# Patient Record
Sex: FEMALE | Race: BLACK OR AFRICAN AMERICAN | NOT HISPANIC OR LATINO | URBAN - METROPOLITAN AREA
[De-identification: names, ages, dates, MRNs, and addresses within clinical notes are randomized per-mention and may not be internally consistent; named-entity substitution may affect disease eponyms.]

---

## 2018-07-19 ENCOUNTER — INPATIENT (INPATIENT)
Facility: HOSPITAL | Age: 58
LOS: 28 days | Discharge: NURSING, REHAB OR LONG-TERM CARE | End: 2018-08-17
Attending: HOSPITALIST | Admitting: HOSPITALIST
Payer: MEDICAID

## 2018-07-19 VITALS
SYSTOLIC BLOOD PRESSURE: 116 MMHG | HEART RATE: 102 BPM | HEIGHT: 65 IN | RESPIRATION RATE: 18 BRPM | OXYGEN SATURATION: 99 % | WEIGHT: 220.02 LBS | DIASTOLIC BLOOD PRESSURE: 91 MMHG | TEMPERATURE: 98 F

## 2018-07-19 LAB
ABO RH CONFIRMATION: SIGNIFICANT CHANGE UP
ALBUMIN SERPL ELPH-MCNC: 2.1 G/DL — LOW (ref 3.3–5)
ALBUMIN SERPL ELPH-MCNC: 2.2 G/DL — LOW (ref 3.3–5)
ALBUMIN SERPL ELPH-MCNC: 2.7 G/DL — LOW (ref 3.3–5)
ALP SERPL-CCNC: 113 U/L — SIGNIFICANT CHANGE UP (ref 40–120)
ALP SERPL-CCNC: 128 U/L — HIGH (ref 40–120)
ALP SERPL-CCNC: 171 U/L — HIGH (ref 40–120)
ALT FLD-CCNC: 103 U/L — HIGH (ref 12–78)
ALT FLD-CCNC: 106 U/L — HIGH (ref 12–78)
ALT FLD-CCNC: 113 U/L — HIGH (ref 12–78)
AMMONIA BLD-MCNC: 37 UMOL/L — HIGH (ref 11–32)
AMMONIA BLD-MCNC: 65 UMOL/L — HIGH (ref 11–32)
AMPHET UR-MCNC: NEGATIVE — SIGNIFICANT CHANGE UP
AMYLASE P1 CFR SERPL: 69 U/L — SIGNIFICANT CHANGE UP (ref 25–115)
ANION GAP SERPL CALC-SCNC: 14 MMOL/L — SIGNIFICANT CHANGE UP (ref 5–17)
ANION GAP SERPL CALC-SCNC: 25 MMOL/L — HIGH (ref 5–17)
ANION GAP SERPL CALC-SCNC: 26 MMOL/L — HIGH (ref 5–17)
APAP SERPL-MCNC: < 2 UG/ML (ref 10–30)
APPEARANCE UR: ABNORMAL
APTT BLD: 28 SEC — SIGNIFICANT CHANGE UP (ref 27.5–37.4)
AST SERPL-CCNC: 84 U/L — HIGH (ref 15–37)
AST SERPL-CCNC: 94 U/L — HIGH (ref 15–37)
AST SERPL-CCNC: 99 U/L — HIGH (ref 15–37)
BACTERIA # UR AUTO: ABNORMAL
BARBITURATES UR SCN-MCNC: NEGATIVE — SIGNIFICANT CHANGE UP
BASE EXCESS BLDA CALC-SCNC: -1.3 MMOL/L — SIGNIFICANT CHANGE UP (ref -2–2)
BASE EXCESS BLDA CALC-SCNC: -14.4 MMOL/L — LOW (ref -2–2)
BASE EXCESS BLDA CALC-SCNC: -20.8 MMOL/L — LOW (ref -2–2)
BASOPHILS # BLD AUTO: 0.03 K/UL — SIGNIFICANT CHANGE UP (ref 0–0.2)
BASOPHILS NFR BLD AUTO: 0.2 % — SIGNIFICANT CHANGE UP (ref 0–2)
BENZODIAZ UR-MCNC: NEGATIVE — SIGNIFICANT CHANGE UP
BILIRUB DIRECT SERPL-MCNC: 7.48 MG/DL — HIGH (ref 0.05–0.2)
BILIRUB INDIRECT FLD-MCNC: 1.7 MG/DL — HIGH (ref 0.2–1)
BILIRUB SERPL-MCNC: 10.7 MG/DL — HIGH (ref 0.2–1.2)
BILIRUB SERPL-MCNC: 7.8 MG/DL — HIGH (ref 0.2–1.2)
BILIRUB SERPL-MCNC: 9.2 MG/DL — HIGH (ref 0.2–1.2)
BILIRUB SERPL-MCNC: 9.2 MG/DL — HIGH (ref 0.2–1.2)
BILIRUB UR-MCNC: ABNORMAL
BLD GP AB SCN SERPL QL: SIGNIFICANT CHANGE UP
BLOOD GAS COMMENTS: SIGNIFICANT CHANGE UP
BLOOD GAS SOURCE: SIGNIFICANT CHANGE UP
BUN SERPL-MCNC: 47 MG/DL — HIGH (ref 7–23)
BUN SERPL-MCNC: 48 MG/DL — HIGH (ref 7–23)
BUN SERPL-MCNC: 49 MG/DL — HIGH (ref 7–23)
CALCIUM SERPL-MCNC: 7.8 MG/DL — LOW (ref 8.5–10.1)
CALCIUM SERPL-MCNC: 8 MG/DL — LOW (ref 8.5–10.1)
CALCIUM SERPL-MCNC: 8.7 MG/DL — SIGNIFICANT CHANGE UP (ref 8.5–10.1)
CHLORIDE SERPL-SCNC: 101 MMOL/L — SIGNIFICANT CHANGE UP (ref 96–108)
CHLORIDE SERPL-SCNC: 101 MMOL/L — SIGNIFICANT CHANGE UP (ref 96–108)
CHLORIDE SERPL-SCNC: 102 MMOL/L — SIGNIFICANT CHANGE UP (ref 96–108)
CK MB BLD-MCNC: 4.2 % — HIGH (ref 0–3.5)
CK MB CFR SERPL CALC: 2.8 NG/ML — SIGNIFICANT CHANGE UP (ref 0.5–3.6)
CK SERPL-CCNC: 66 U/L — SIGNIFICANT CHANGE UP (ref 26–192)
CK SERPL-CCNC: 83 U/L — SIGNIFICANT CHANGE UP (ref 26–192)
CK SERPL-CCNC: 93 U/L — SIGNIFICANT CHANGE UP (ref 26–192)
CO2 SERPL-SCNC: 12 MMOL/L — LOW (ref 22–31)
CO2 SERPL-SCNC: 22 MMOL/L — SIGNIFICANT CHANGE UP (ref 22–31)
CO2 SERPL-SCNC: 7 MMOL/L — CRITICAL LOW (ref 22–31)
COCAINE METAB.OTHER UR-MCNC: NEGATIVE — SIGNIFICANT CHANGE UP
COLOR SPEC: ABNORMAL
COMMENT - URINE: SIGNIFICANT CHANGE UP
CREAT SERPL-MCNC: 1.46 MG/DL — HIGH (ref 0.5–1.3)
CREAT SERPL-MCNC: 1.62 MG/DL — HIGH (ref 0.5–1.3)
CREAT SERPL-MCNC: 1.79 MG/DL — HIGH (ref 0.5–1.3)
DIFF PNL FLD: ABNORMAL
EOSINOPHIL # BLD AUTO: 0 K/UL — SIGNIFICANT CHANGE UP (ref 0–0.5)
EOSINOPHIL NFR BLD AUTO: 0 % — SIGNIFICANT CHANGE UP (ref 0–6)
EPI CELLS # UR: ABNORMAL
ETHANOL SERPL-MCNC: <10 MG/DL — SIGNIFICANT CHANGE UP (ref 0–10)
GGT SERPL-CCNC: 48 U/L — HIGH (ref 8–40)
GLUCOSE SERPL-MCNC: 114 MG/DL — HIGH (ref 70–99)
GLUCOSE SERPL-MCNC: 206 MG/DL — HIGH (ref 70–99)
GLUCOSE SERPL-MCNC: 75 MG/DL — SIGNIFICANT CHANGE UP (ref 70–99)
GLUCOSE UR QL: NEGATIVE MG/DL — SIGNIFICANT CHANGE UP
HAV IGM SER-ACNC: SIGNIFICANT CHANGE UP
HBV CORE IGM SER-ACNC: SIGNIFICANT CHANGE UP
HBV SURFACE AG SER-ACNC: SIGNIFICANT CHANGE UP
HCG SERPL-ACNC: <1 MIU/ML — SIGNIFICANT CHANGE UP
HCO3 BLDA-SCNC: 10 MMOL/L — LOW (ref 21–29)
HCO3 BLDA-SCNC: 19 MMOL/L — LOW (ref 21–29)
HCO3 BLDA-SCNC: 5 MMOL/L — LOW (ref 21–29)
HCT VFR BLD CALC: 38.4 % — SIGNIFICANT CHANGE UP (ref 34.5–45)
HCV AB S/CO SERPL IA: 0.25 S/CO — SIGNIFICANT CHANGE UP
HCV AB SERPL-IMP: SIGNIFICANT CHANGE UP
HGB BLD-MCNC: 12.3 G/DL — SIGNIFICANT CHANGE UP (ref 11.5–15.5)
HIV 1 & 2 AB SERPL IA.RAPID: SIGNIFICANT CHANGE UP
HOROWITZ INDEX BLDA+IHG-RTO: 32 — SIGNIFICANT CHANGE UP
HOROWITZ INDEX BLDA+IHG-RTO: 45 — SIGNIFICANT CHANGE UP
HOROWITZ INDEX BLDA+IHG-RTO: 60 — SIGNIFICANT CHANGE UP
HYALINE CASTS # UR AUTO: ABNORMAL /LPF
IMM GRANULOCYTES NFR BLD AUTO: 1.5 % — SIGNIFICANT CHANGE UP (ref 0–1.5)
INR BLD: 2.74 RATIO — HIGH (ref 0.88–1.16)
INR BLD: 3.2 RATIO — HIGH (ref 0.88–1.16)
INR BLD: 3.95 RATIO — HIGH (ref 0.88–1.16)
KETONES UR-MCNC: ABNORMAL
LACTATE SERPL-SCNC: 12.2 MMOL/L — CRITICAL HIGH (ref 0.7–2)
LACTATE SERPL-SCNC: 13.6 MMOL/L — CRITICAL HIGH (ref 0.7–2)
LACTATE SERPL-SCNC: 6.1 MMOL/L — CRITICAL HIGH (ref 0.7–2)
LEUKOCYTE ESTERASE UR-ACNC: ABNORMAL
LIDOCAIN IGE QN: 169 U/L — SIGNIFICANT CHANGE UP (ref 73–393)
LYMPHOCYTES # BLD AUTO: 15.5 % — SIGNIFICANT CHANGE UP (ref 13–44)
LYMPHOCYTES # BLD AUTO: 2.2 K/UL — SIGNIFICANT CHANGE UP (ref 1–3.3)
MAGNESIUM SERPL-MCNC: 2.4 MG/DL — SIGNIFICANT CHANGE UP (ref 1.6–2.6)
MCHC RBC-ENTMCNC: 28.3 PG — SIGNIFICANT CHANGE UP (ref 27–34)
MCHC RBC-ENTMCNC: 32 GM/DL — SIGNIFICANT CHANGE UP (ref 32–36)
MCV RBC AUTO: 88.3 FL — SIGNIFICANT CHANGE UP (ref 80–100)
METHADONE UR-MCNC: NEGATIVE — SIGNIFICANT CHANGE UP
MONOCYTES # BLD AUTO: 0.73 K/UL — SIGNIFICANT CHANGE UP (ref 0–0.9)
MONOCYTES NFR BLD AUTO: 5.1 % — SIGNIFICANT CHANGE UP (ref 2–14)
NEUTROPHILS # BLD AUTO: 11.04 K/UL — HIGH (ref 1.8–7.4)
NEUTROPHILS NFR BLD AUTO: 77.7 % — HIGH (ref 43–77)
NITRITE UR-MCNC: POSITIVE
NRBC # BLD: 0 /100 WBCS — SIGNIFICANT CHANGE UP (ref 0–0)
NT-PROBNP SERPL-SCNC: 7353 PG/ML — HIGH (ref 0–125)
OPIATES UR-MCNC: NEGATIVE — SIGNIFICANT CHANGE UP
PCO2 BLDA: 13 MMHG — LOW (ref 32–46)
PCO2 BLDA: 19 MMHG — LOW (ref 32–46)
PCO2 BLDA: 21 MMHG — LOW (ref 32–46)
PCP SPEC-MCNC: SIGNIFICANT CHANGE UP
PCP UR-MCNC: NEGATIVE — SIGNIFICANT CHANGE UP
PH BLD: 7.25 — LOW (ref 7.35–7.45)
PH BLD: 7.33 — LOW (ref 7.35–7.45)
PH BLD: 7.58 — HIGH (ref 7.35–7.45)
PH UR: 5 — SIGNIFICANT CHANGE UP (ref 5–8)
PLATELET # BLD AUTO: 170 K/UL — SIGNIFICANT CHANGE UP (ref 150–400)
PO2 BLDA: 134 MMHG — HIGH (ref 74–108)
PO2 BLDA: 215 MMHG — HIGH (ref 74–108)
PO2 BLDA: 272 MMHG — HIGH (ref 74–108)
POTASSIUM SERPL-MCNC: 3.2 MMOL/L — LOW (ref 3.5–5.3)
POTASSIUM SERPL-MCNC: 3.5 MMOL/L — SIGNIFICANT CHANGE UP (ref 3.5–5.3)
POTASSIUM SERPL-MCNC: 4.2 MMOL/L — SIGNIFICANT CHANGE UP (ref 3.5–5.3)
POTASSIUM SERPL-SCNC: 3.2 MMOL/L — LOW (ref 3.5–5.3)
POTASSIUM SERPL-SCNC: 3.5 MMOL/L — SIGNIFICANT CHANGE UP (ref 3.5–5.3)
POTASSIUM SERPL-SCNC: 4.2 MMOL/L — SIGNIFICANT CHANGE UP (ref 3.5–5.3)
PROT SERPL-MCNC: 6.1 GM/DL — SIGNIFICANT CHANGE UP (ref 6–8.3)
PROT SERPL-MCNC: 6.4 GM/DL — SIGNIFICANT CHANGE UP (ref 6–8.3)
PROT SERPL-MCNC: 7.7 GM/DL — SIGNIFICANT CHANGE UP (ref 6–8.3)
PROT UR-MCNC: 100 MG/DL
PROTHROM AB SERPL-ACNC: 30.5 SEC — HIGH (ref 9.8–12.7)
PROTHROM AB SERPL-ACNC: 35.7 SEC — HIGH (ref 9.8–12.7)
PROTHROM AB SERPL-ACNC: 44.3 SEC — HIGH (ref 9.8–12.7)
RBC # BLD: 4.35 M/UL — SIGNIFICANT CHANGE UP (ref 3.8–5.2)
RBC # FLD: 25.4 % — HIGH (ref 10.3–14.5)
RBC CASTS # UR COMP ASSIST: ABNORMAL /HPF (ref 0–4)
SAO2 % BLDA: 100 % — HIGH (ref 92–96)
SAO2 % BLDA: 98 % — HIGH (ref 92–96)
SAO2 % BLDA: 98 % — HIGH (ref 92–96)
SODIUM SERPL-SCNC: 134 MMOL/L — LOW (ref 135–145)
SODIUM SERPL-SCNC: 137 MMOL/L — SIGNIFICANT CHANGE UP (ref 135–145)
SODIUM SERPL-SCNC: 139 MMOL/L — SIGNIFICANT CHANGE UP (ref 135–145)
SP GR SPEC: 1.02 — SIGNIFICANT CHANGE UP (ref 1.01–1.02)
THC UR QL: NEGATIVE — SIGNIFICANT CHANGE UP
TROPONIN I SERPL-MCNC: 0.03 NG/ML — SIGNIFICANT CHANGE UP (ref 0.01–0.04)
TROPONIN I SERPL-MCNC: 0.08 NG/ML — HIGH (ref 0.01–0.04)
TROPONIN I SERPL-MCNC: 0.09 NG/ML — HIGH (ref 0.01–0.04)
TSH SERPL-MCNC: 0.01 UU/ML — LOW (ref 0.36–3.74)
UROBILINOGEN FLD QL: 8 MG/DL
WBC # BLD: 14.22 K/UL — HIGH (ref 3.8–10.5)
WBC # FLD AUTO: 14.22 K/UL — HIGH (ref 3.8–10.5)
WBC UR QL: ABNORMAL

## 2018-07-19 PROCEDURE — 70450 CT HEAD/BRAIN W/O DYE: CPT | Mod: 26

## 2018-07-19 PROCEDURE — 76700 US EXAM ABDOM COMPLETE: CPT | Mod: 26

## 2018-07-19 PROCEDURE — 71045 X-RAY EXAM CHEST 1 VIEW: CPT | Mod: 26

## 2018-07-19 PROCEDURE — 99223 1ST HOSP IP/OBS HIGH 75: CPT

## 2018-07-19 PROCEDURE — 99291 CRITICAL CARE FIRST HOUR: CPT

## 2018-07-19 PROCEDURE — 93306 TTE W/DOPPLER COMPLETE: CPT | Mod: 26

## 2018-07-19 PROCEDURE — 93970 EXTREMITY STUDY: CPT | Mod: 26

## 2018-07-19 PROCEDURE — 74176 CT ABD & PELVIS W/O CONTRAST: CPT | Mod: 26

## 2018-07-19 PROCEDURE — 99292 CRITICAL CARE ADDL 30 MIN: CPT

## 2018-07-19 PROCEDURE — 99053 MED SERV 10PM-8AM 24 HR FAC: CPT

## 2018-07-19 PROCEDURE — 71250 CT THORAX DX C-: CPT | Mod: 26

## 2018-07-19 PROCEDURE — 71045 X-RAY EXAM CHEST 1 VIEW: CPT | Mod: 26,77

## 2018-07-19 RX ORDER — MEROPENEM 1 G/30ML
INJECTION INTRAVENOUS
Qty: 0 | Refills: 0 | Status: DISCONTINUED | OUTPATIENT
Start: 2018-07-19 | End: 2018-07-19

## 2018-07-19 RX ORDER — MEROPENEM 1 G/30ML
INJECTION INTRAVENOUS
Qty: 0 | Refills: 0 | Status: DISCONTINUED | OUTPATIENT
Start: 2018-07-19 | End: 2018-08-09

## 2018-07-19 RX ORDER — FLUCONAZOLE 150 MG/1
TABLET ORAL
Qty: 0 | Refills: 0 | Status: DISCONTINUED | OUTPATIENT
Start: 2018-07-19 | End: 2018-07-26

## 2018-07-19 RX ORDER — FLUCONAZOLE 150 MG/1
200 TABLET ORAL EVERY 24 HOURS
Qty: 0 | Refills: 0 | Status: DISCONTINUED | OUTPATIENT
Start: 2018-07-20 | End: 2018-07-26

## 2018-07-19 RX ORDER — ACETYLCYSTEINE 200 MG/ML
10 VIAL (ML) MISCELLANEOUS ONCE
Qty: 0 | Refills: 0 | Status: COMPLETED | OUTPATIENT
Start: 2018-07-19 | End: 2018-07-19

## 2018-07-19 RX ORDER — AZITHROMYCIN 500 MG/1
500 TABLET, FILM COATED ORAL EVERY 24 HOURS
Qty: 0 | Refills: 0 | Status: DISCONTINUED | OUTPATIENT
Start: 2018-07-20 | End: 2018-07-20

## 2018-07-19 RX ORDER — PROPOFOL 10 MG/ML
50 INJECTION, EMULSION INTRAVENOUS
Qty: 1000 | Refills: 0 | Status: DISCONTINUED | OUTPATIENT
Start: 2018-07-19 | End: 2018-07-23

## 2018-07-19 RX ORDER — FENTANYL CITRATE 50 UG/ML
100 INJECTION INTRAVENOUS ONCE
Qty: 0 | Refills: 0 | Status: DISCONTINUED | OUTPATIENT
Start: 2018-07-19 | End: 2018-07-19

## 2018-07-19 RX ORDER — FLUCONAZOLE 150 MG/1
200 TABLET ORAL ONCE
Qty: 0 | Refills: 0 | Status: COMPLETED | OUTPATIENT
Start: 2018-07-19 | End: 2018-07-19

## 2018-07-19 RX ORDER — MEROPENEM 1 G/30ML
1000 INJECTION INTRAVENOUS EVERY 8 HOURS
Qty: 0 | Refills: 0 | Status: DISCONTINUED | OUTPATIENT
Start: 2018-07-19 | End: 2018-07-19

## 2018-07-19 RX ORDER — MEROPENEM 1 G/30ML
1000 INJECTION INTRAVENOUS ONCE
Qty: 0 | Refills: 0 | Status: DISCONTINUED | OUTPATIENT
Start: 2018-07-19 | End: 2018-07-19

## 2018-07-19 RX ORDER — LACTULOSE 10 G/15ML
20 SOLUTION ORAL THREE TIMES A DAY
Qty: 0 | Refills: 0 | Status: DISCONTINUED | OUTPATIENT
Start: 2018-07-19 | End: 2018-07-19

## 2018-07-19 RX ORDER — SODIUM CHLORIDE 9 MG/ML
1000 INJECTION INTRAMUSCULAR; INTRAVENOUS; SUBCUTANEOUS ONCE
Qty: 0 | Refills: 0 | Status: COMPLETED | OUTPATIENT
Start: 2018-07-19 | End: 2018-07-19

## 2018-07-19 RX ORDER — MIDAZOLAM HYDROCHLORIDE 1 MG/ML
6 INJECTION, SOLUTION INTRAMUSCULAR; INTRAVENOUS ONCE
Qty: 0 | Refills: 0 | Status: DISCONTINUED | OUTPATIENT
Start: 2018-07-19 | End: 2018-07-19

## 2018-07-19 RX ORDER — CHLORHEXIDINE GLUCONATE 213 G/1000ML
15 SOLUTION TOPICAL
Qty: 0 | Refills: 0 | Status: DISCONTINUED | OUTPATIENT
Start: 2018-07-19 | End: 2018-07-24

## 2018-07-19 RX ORDER — FUROSEMIDE 40 MG
40 TABLET ORAL ONCE
Qty: 0 | Refills: 0 | Status: COMPLETED | OUTPATIENT
Start: 2018-07-19 | End: 2018-07-19

## 2018-07-19 RX ORDER — ACETYLCYSTEINE 200 MG/ML
15 VIAL (ML) MISCELLANEOUS ONCE
Qty: 0 | Refills: 0 | Status: COMPLETED | OUTPATIENT
Start: 2018-07-19 | End: 2018-07-19

## 2018-07-19 RX ORDER — CIPROFLOXACIN LACTATE 400MG/40ML
400 VIAL (ML) INTRAVENOUS ONCE
Qty: 0 | Refills: 0 | Status: COMPLETED | OUTPATIENT
Start: 2018-07-19 | End: 2018-07-19

## 2018-07-19 RX ORDER — AZTREONAM 2 G
1000 VIAL (EA) INJECTION ONCE
Qty: 0 | Refills: 0 | Status: COMPLETED | OUTPATIENT
Start: 2018-07-19 | End: 2018-07-19

## 2018-07-19 RX ORDER — PANTOPRAZOLE SODIUM 20 MG/1
40 TABLET, DELAYED RELEASE ORAL DAILY
Qty: 0 | Refills: 0 | Status: DISCONTINUED | OUTPATIENT
Start: 2018-07-19 | End: 2018-07-21

## 2018-07-19 RX ORDER — AZITHROMYCIN 500 MG/1
500 TABLET, FILM COATED ORAL ONCE
Qty: 0 | Refills: 0 | Status: COMPLETED | OUTPATIENT
Start: 2018-07-19 | End: 2018-07-19

## 2018-07-19 RX ORDER — AZTREONAM 2 G
1000 VIAL (EA) INJECTION EVERY 8 HOURS
Qty: 0 | Refills: 0 | Status: DISCONTINUED | OUTPATIENT
Start: 2018-07-19 | End: 2018-07-19

## 2018-07-19 RX ORDER — MEROPENEM 1 G/30ML
1000 INJECTION INTRAVENOUS ONCE
Qty: 0 | Refills: 0 | Status: COMPLETED | OUTPATIENT
Start: 2018-07-19 | End: 2018-07-19

## 2018-07-19 RX ORDER — AZITHROMYCIN 500 MG/1
TABLET, FILM COATED ORAL
Qty: 0 | Refills: 0 | Status: DISCONTINUED | OUTPATIENT
Start: 2018-07-19 | End: 2018-07-20

## 2018-07-19 RX ORDER — CHLORHEXIDINE GLUCONATE 213 G/1000ML
1 SOLUTION TOPICAL
Qty: 0 | Refills: 0 | Status: DISCONTINUED | OUTPATIENT
Start: 2018-07-19 | End: 2018-08-02

## 2018-07-19 RX ORDER — METRONIDAZOLE 500 MG
500 TABLET ORAL EVERY 8 HOURS
Qty: 0 | Refills: 0 | Status: DISCONTINUED | OUTPATIENT
Start: 2018-07-19 | End: 2018-07-19

## 2018-07-19 RX ORDER — MEROPENEM 1 G/30ML
1000 INJECTION INTRAVENOUS EVERY 8 HOURS
Qty: 0 | Refills: 0 | Status: DISCONTINUED | OUTPATIENT
Start: 2018-07-19 | End: 2018-08-09

## 2018-07-19 RX ORDER — SODIUM BICARBONATE 1 MEQ/ML
100 SYRINGE (ML) INTRAVENOUS ONCE
Qty: 0 | Refills: 0 | Status: COMPLETED | OUTPATIENT
Start: 2018-07-19 | End: 2018-07-19

## 2018-07-19 RX ORDER — METRONIDAZOLE 500 MG
500 TABLET ORAL ONCE
Qty: 0 | Refills: 0 | Status: COMPLETED | OUTPATIENT
Start: 2018-07-19 | End: 2018-07-19

## 2018-07-19 RX ORDER — METRONIDAZOLE 500 MG
TABLET ORAL
Qty: 0 | Refills: 0 | Status: DISCONTINUED | OUTPATIENT
Start: 2018-07-19 | End: 2018-07-19

## 2018-07-19 RX ORDER — NOREPINEPHRINE BITARTRATE/D5W 8 MG/250ML
0.05 PLASTIC BAG, INJECTION (ML) INTRAVENOUS
Qty: 8 | Refills: 0 | Status: DISCONTINUED | OUTPATIENT
Start: 2018-07-19 | End: 2018-07-19

## 2018-07-19 RX ORDER — ACETYLCYSTEINE 200 MG/ML
5 VIAL (ML) MISCELLANEOUS ONCE
Qty: 0 | Refills: 0 | Status: COMPLETED | OUTPATIENT
Start: 2018-07-19 | End: 2018-07-19

## 2018-07-19 RX ORDER — PHYTONADIONE (VIT K1) 5 MG
10 TABLET ORAL ONCE
Qty: 0 | Refills: 0 | Status: COMPLETED | OUTPATIENT
Start: 2018-07-19 | End: 2018-07-19

## 2018-07-19 RX ORDER — POTASSIUM CHLORIDE 20 MEQ
40 PACKET (EA) ORAL ONCE
Qty: 0 | Refills: 0 | Status: COMPLETED | OUTPATIENT
Start: 2018-07-19 | End: 2018-07-19

## 2018-07-19 RX ORDER — ALTEPLASE 100 MG
2 KIT INTRAVENOUS ONCE
Qty: 0 | Refills: 0 | Status: COMPLETED | OUTPATIENT
Start: 2018-07-19 | End: 2018-07-19

## 2018-07-19 RX ORDER — SODIUM BICARBONATE 1 MEQ/ML
0.23 SYRINGE (ML) INTRAVENOUS
Qty: 150 | Refills: 0 | Status: DISCONTINUED | OUTPATIENT
Start: 2018-07-19 | End: 2018-07-20

## 2018-07-19 RX ORDER — NOREPINEPHRINE BITARTRATE/D5W 8 MG/250ML
0.05 PLASTIC BAG, INJECTION (ML) INTRAVENOUS
Qty: 16 | Refills: 0 | Status: DISCONTINUED | OUTPATIENT
Start: 2018-07-19 | End: 2018-07-22

## 2018-07-19 RX ORDER — AZTREONAM 2 G
VIAL (EA) INJECTION
Qty: 0 | Refills: 0 | Status: DISCONTINUED | OUTPATIENT
Start: 2018-07-19 | End: 2018-07-19

## 2018-07-19 RX ORDER — VANCOMYCIN HCL 1 G
1000 VIAL (EA) INTRAVENOUS ONCE
Qty: 0 | Refills: 0 | Status: COMPLETED | OUTPATIENT
Start: 2018-07-19 | End: 2018-07-19

## 2018-07-19 RX ADMIN — SODIUM CHLORIDE 1000 MILLILITER(S): 9 INJECTION INTRAMUSCULAR; INTRAVENOUS; SUBCUTANEOUS at 07:25

## 2018-07-19 RX ADMIN — Medication 40 MILLIGRAM(S): at 08:54

## 2018-07-19 RX ADMIN — Medication 325 GRAM(S): at 12:26

## 2018-07-19 RX ADMIN — Medication 250 MILLIGRAM(S): at 07:38

## 2018-07-19 RX ADMIN — PANTOPRAZOLE SODIUM 40 MILLIGRAM(S): 20 TABLET, DELAYED RELEASE ORAL at 18:55

## 2018-07-19 RX ADMIN — Medication 150 MEQ/KG/HR: at 21:45

## 2018-07-19 RX ADMIN — MIDAZOLAM HYDROCHLORIDE 6 MILLIGRAM(S): 1 INJECTION, SOLUTION INTRAMUSCULAR; INTRAVENOUS at 11:10

## 2018-07-19 RX ADMIN — Medication 9.36 MICROGRAM(S)/KG/MIN: at 11:20

## 2018-07-19 RX ADMIN — FENTANYL CITRATE 100 MICROGRAM(S): 50 INJECTION INTRAVENOUS at 13:48

## 2018-07-19 RX ADMIN — AZITHROMYCIN 255 MILLIGRAM(S): 500 TABLET, FILM COATED ORAL at 19:33

## 2018-07-19 RX ADMIN — PROPOFOL 29.94 MICROGRAM(S)/KG/MIN: 10 INJECTION, EMULSION INTRAVENOUS at 17:45

## 2018-07-19 RX ADMIN — Medication 200 MILLIGRAM(S): at 08:54

## 2018-07-19 RX ADMIN — Medication 100 MILLIGRAM(S): at 14:03

## 2018-07-19 RX ADMIN — Medication 102 MILLIGRAM(S): at 16:54

## 2018-07-19 RX ADMIN — Medication 4.68 MICROGRAM(S)/KG/MIN: at 14:03

## 2018-07-19 RX ADMIN — Medication 50 MILLIGRAM(S): at 16:01

## 2018-07-19 RX ADMIN — Medication 150 MEQ/KG/HR: at 12:30

## 2018-07-19 RX ADMIN — Medication 65.63 GRAM(S): at 18:12

## 2018-07-19 RX ADMIN — FENTANYL CITRATE 100 MICROGRAM(S): 50 INJECTION INTRAVENOUS at 11:40

## 2018-07-19 RX ADMIN — Medication 100 MILLIEQUIVALENT(S): at 10:15

## 2018-07-19 RX ADMIN — CHLORHEXIDINE GLUCONATE 1 APPLICATION(S): 213 SOLUTION TOPICAL at 12:27

## 2018-07-19 RX ADMIN — FENTANYL CITRATE 100 MICROGRAM(S): 50 INJECTION INTRAVENOUS at 11:10

## 2018-07-19 RX ADMIN — SODIUM CHLORIDE 1000 MILLILITER(S): 9 INJECTION INTRAMUSCULAR; INTRAVENOUS; SUBCUTANEOUS at 06:15

## 2018-07-19 RX ADMIN — CHLORHEXIDINE GLUCONATE 15 MILLILITER(S): 213 SOLUTION TOPICAL at 17:45

## 2018-07-19 RX ADMIN — ALTEPLASE 2 MILLIGRAM(S): KIT at 15:25

## 2018-07-19 RX ADMIN — FLUCONAZOLE 100 MILLIGRAM(S): 150 TABLET ORAL at 18:12

## 2018-07-19 RX ADMIN — MEROPENEM 100 MILLIGRAM(S): 1 INJECTION INTRAVENOUS at 18:55

## 2018-07-19 RX ADMIN — FENTANYL CITRATE 100 MICROGRAM(S): 50 INJECTION INTRAVENOUS at 12:19

## 2018-07-19 RX ADMIN — SODIUM CHLORIDE 1000 MILLILITER(S): 9 INJECTION INTRAMUSCULAR; INTRAVENOUS; SUBCUTANEOUS at 11:25

## 2018-07-19 RX ADMIN — PROPOFOL 29.94 MICROGRAM(S)/KG/MIN: 10 INJECTION, EMULSION INTRAVENOUS at 11:15

## 2018-07-19 RX ADMIN — MEROPENEM 100 MILLIGRAM(S): 1 INJECTION INTRAVENOUS at 22:07

## 2018-07-19 RX ADMIN — Medication 131.25 GRAM(S): at 13:52
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
## 2018-07-19 NOTE — CONSULT NOTE ADULT - SUBJECTIVE AND OBJECTIVE BOX
CARDIOLOGY CONSULT NOTE    Patient is a 54y Female with a known history of :    HPI:  55yo lady who presented to the ER in respiratory distress / metabolic acidosis / hepatic encephalopathy / hepatic failure.  Started on bicarb gtt.  Intubated after a ?PEA arrest s/p DCCV for Afib with RVR.  Per ICU attending, bedside echo with RV dilation, severe TR, septal bowing, mildly depressed LVEF    REVIEW OF SYSTEMS:  unable to obtain from pt    MEDICATIONS  (STANDING):  acetylcysteine IVPB 10 Gram(s) IV Intermittent once  aztreonam  IVPB 1000 milliGRAM(s) IV Intermittent once  aztreonam  IVPB 1000 milliGRAM(s) IV Intermittent every 8 hours  aztreonam  IVPB      chlorhexidine 0.12% Liquid 15 milliLiter(s) Swish and Spit two times a day  chlorhexidine 4% Liquid 1 Application(s) Topical <User Schedule>  lactulose Syrup 20 Gram(s) Oral three times a day  metroNIDAZOLE  IVPB      metroNIDAZOLE  IVPB 500 milliGRAM(s) IV Intermittent every 8 hours  norepinephrine Infusion 0.05 MICROgram(s)/kG/Min (4.678 mL/Hr) IV Continuous <Continuous>  propofol Infusion 50 MICROgram(s)/kG/Min (29.94 mL/Hr) IV Continuous <Continuous>  sodium bicarbonate  Infusion 0.225 mEq/kG/Hr (150 mL/Hr) IV Continuous <Continuous>    MEDICATIONS  (PRN):      ALLERGIES: penicillin (Unknown)      FAMILY HISTORY:      PHYSICAL EXAMINATION:  -----------------------------  T(C): 36.6 (07-19-18 @ 04:40), Max: 36.6 (07-19-18 @ 04:40)  HR: 72 (07-19-18 @ 13:34) (69 - 102)  BP: 120/89 (07-19-18 @ 13:30) (84/71 - 120/89)  RR: 23 (07-19-18 @ 13:30) (18 - 24)  SpO2: 100% (07-19-18 @ 13:34) (87% - 100%)  Wt(kg): --    Height (cm): 165.1 (07-19 @ 04:40)  Weight (kg): 89.5 (07-19 @ 11:15)  BMI (kg/m2): 32.8 (07-19 @ 11:15)  BSA (m2): 1.97 (07-19 @ 11:15)    Constitutional: intubated  Eyes: the conjunctiva exhibited no abnormalities and the eyelids demonstrated no xanthelasmas.   HEENT: normal oral mucosa, no oral pallor and no oral cyanosis.   Neck: normal jugular venous A waves present, normal jugular venous V waves present and no jugular venous mcknight A waves.   Pulmonary: no respiratory distress, normal respiratory rhythm and effort, no accessory muscle use and lungs were clear to auscultation bilaterally.   Cardiovascular: heart rate and rhythm were normal, normal S1 and S2 and no murmur, gallop, rub, heave or thrill are present.   Abdomen: soft, non-tender, no hepato-splenomegaly and no abdominal mass palpated.   Musculoskeletal: the gait could not be assessed..   Extremities: no clubbing of the fingernails, no localized cyanosis, no petechial hemorrhages and no ischemic changes.   Skin: normal skin color and pigmentation, no rash, no venous stasis, no skin lesions, no skin ulcer and no xanthoma was observed.   Psychiatric: intubated    LABS:   --------  07-19    134<L>  |  102  |  49<H>  ----------------------------<  75  4.2   |  7<LL>  |  1.79<H>    Ca    8.7      19 Jul 2018 06:08  Mg     2.4     07-19    TPro  7.7  /  Alb  2.7<L>  /  TBili  10.7<H>  /  DBili  x   /  AST  84<H>  /  ALT  113<H>  /  AlkPhos  171<H>  07-19                         12.3   14.22 )-----------( 170      ( 19 Jul 2018 06:08 )             38.4     PT/INR - ( 19 Jul 2018 13:59 )   PT: 44.3 sec;   INR: 3.95 ratio         PTT - ( 19 Jul 2018 06:08 )  PTT:28.0 sec  07-19 @ 06:08 BNP: 7353 pg/mL    07-19 @ 06:08 CPK total:--, CKMB --, Troponin I - .034 ng/mL

## 2018-07-19 NOTE — CHART NOTE - NSCHARTNOTEFT_GEN_A_CORE
Brown port of triple lumen catheter with very slow flow and minimal blood return. Cathflo instilled. Will let dwell for 2 hours per protocol.   TIM DuttonC
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
## 2018-07-19 NOTE — ED ADULT NURSE NOTE - WOUND SIZE #1
chief complaint : dyspnea        SUBJECTIVE / OVERNIGHT EVENTS: pt shortness of breath at present , placed in bipap / given nebs, as per nursing staff - pt been eating salty food and drinking coffee liquids brought in by pts family since yesterday     MEDICATIONS  (STANDING):  ALBUTerol/ipratropium for Nebulization 3 milliLiter(s) Nebulizer every 6 hours  amiodarone    Tablet 200 milliGRAM(s) Oral daily  ampicillin  IVPB 2 Gram(s) IV Intermittent every 12 hours  artificial  tears Solution 1 Drop(s) Both EYES four times a day  aspirin  chewable 81 milliGRAM(s) Oral daily  atorvastatin 40 milliGRAM(s) Oral at bedtime  busPIRone 5 milliGRAM(s) Oral two times a day  clopidogrel Tablet 75 milliGRAM(s) Oral daily  dextrose 50% Injectable 12.5 Gram(s) IV Push once  dextrose 50% Injectable 25 Gram(s) IV Push once  dextrose 50% Injectable 25 Gram(s) IV Push once  epoetin georgie Injectable 4000 Unit(s) IV Push <User Schedule>  heparin  Infusion.  Unit(s)/Hr (11 mL/Hr) IV Continuous <Continuous>  insulin lispro (HumaLOG) corrective regimen sliding scale   SubCutaneous three times a day before meals  insulin lispro (HumaLOG) corrective regimen sliding scale   SubCutaneous at bedtime  metoprolol tartrate 25 milliGRAM(s) Oral two times a day  multivitamin 1 Tablet(s) Oral daily  pantoprazole    Tablet 40 milliGRAM(s) Oral before breakfast  tamsulosin 0.4 milliGRAM(s) Oral at bedtime    MEDICATIONS  (PRN):  acetaminophen  Suppository 650 milliGRAM(s) Rectal every 6 hours PRN For Temp greater than 38 C (100.4 F)  dextrose 40% Gel 15 Gram(s) Oral once PRN Blood Glucose LESS THAN 70 milliGRAM(s)/deciliter  glucagon  Injectable 1 milliGRAM(s) IntraMuscular once PRN Glucose LESS THAN 70 milligrams/deciliter  heparin  Injectable 5000 Unit(s) IV Push every 6 hours PRN For aPTT less than 40  heparin  Injectable 2500 Unit(s) IV Push every 6 hours PRN For aPTT between 40 - 57    Vital Signs Last 24 Hrs  T(C): 36.9 (24 Jun 2018 18:40), Max: 36.9 (24 Jun 2018 18:40)  T(F): 98.4 (24 Jun 2018 18:40), Max: 98.4 (24 Jun 2018 18:40)  HR: 83 (24 Jun 2018 19:39) (77 - 103)  BP: 109/72 (24 Jun 2018 18:40) (109/72 - 144/80)  BP(mean): --  RR: 20 (24 Jun 2018 18:40) (18 - 28)  SpO2: 95% (24 Jun 2018 19:39) (95% - 100%)    Constitutional: No fever, fatigue  Skin: No rash.  Eyes: No recent vision problems or eye pain.  ENT: No congestion, ear pain, or sore throat.  Cardiovascular: No chest pain or palpation.  Respiratory: No cough, shortness of breath, congestion, or wheezing.  Gastrointestinal: No abdominal pain, nausea, vomiting, or diarrhea.  Genitourinary: No dysuria.  Musculoskeletal: No joint swelling.  Neurologic: No headache.    PHYSICAL EXAM:  GENERAL: NAD  EYES: EOMI, PERRLA  NECK: Supple, No JVD  CHEST/LUNG: dec breath sounds at bases, fine exp wheezing+  HEART:  S1 , S2 +  ABDOMEN: soft bs+  EXTREMITIES:  trace edema+  NEUROLOGY:alert awake     LABS:  06-24    137  |  95<L>  |  26<H>  ----------------------------<  157<H>  3.7   |  26  |  4.44<H>    Ca    8.8      24 Jun 2018 03:45  Mg     2.2     06-24      Creatinine Trend: 4.44 <--, 6.21 <--, 4.68 <--, 6.67 <--, 6.08 <--, 4.72 <--, 4.12 <--, 7.78 <--                        7.1    6.51  )-----------( 257      ( 24 Jun 2018 03:45 )             23.9     Urine Studies: 2.5x1.5x0.2

## 2018-07-19 NOTE — CONSULT NOTE ADULT - SUBJECTIVE AND OBJECTIVE BOX
HPI:  53 y/o black female with unknown PMH, admitted via the ER today with weakness and found to be in Rapid AFib and  hyopoglycemic with an accucheck of 26.  Patient was Cardioverted to RSR and noted to be hypotensive and with respiratory distress and metabolic acidosis and required intubation in the ER.  Prior to intubation patient was reportedly a poor historian and EMS reported that the home she was picked up from appeared to be unkempt.  Patient was transferred to the CCU for further care and w/u revealed increase WBC's, increased TBili, worsening PT/ INR/ PTT, and U/A revealed pyuria and LE +.  Blood and urine Cx/s were obtained and patient was rx'ed with Vanco x 1, Cipro x 1, Azactam, and Flagyl.   CT of the Head, Chest, Abdomen, and Pelvis was done and now reported with a Lt Staghorn calculus with air visible in the Lt renal calyces along with ? Pericholecystic fluid or GB wall thickening, and densities in the Rt mainstem bronchus along with bilateral infiltrates - ? PNA vs pulmonary edema?    Allergies :  penicillin (Unknown)  Intolerances - none        Social History:  Tobacco: negative x several yrs.  Alcohol: negative  Recent Travel: unknown  Immunizations: unknown  Reportedly lives in a home with a male - unknown relationship -  house reported by EMS as appearing unkempt      MEDICATIONS  (STANDING):  acetylcysteine IVPB 10 Gram(s) IV Intermittent once  aztreonam  IVPB      aztreonam  IVPB 1000 milliGRAM(s) IV Intermittent every 8 hours  chlorhexidine 0.12% Liquid 15 milliLiter(s) Swish and Spit two times a day  chlorhexidine 4% Liquid 1 Application(s) Topical <User Schedule>  metroNIDAZOLE  IVPB      metroNIDAZOLE  IVPB 500 milliGRAM(s) IV Intermittent every 8 hours  norepinephrine Infusion 0.05 MICROgram(s)/kG/Min (4.678 mL/Hr) IV Continuous <Continuous>  phytonadione  IVPB 10 milliGRAM(s) IV Intermittent once  propofol Infusion 50 MICROgram(s)/kG/Min (29.94 mL/Hr) IV Continuous <Continuous>  sodium bicarbonate  Infusion 0.225 mEq/kG/Hr (150 mL/Hr) IV Continuous <Continuous>    MEDICATIONS  (PRN):        LABS:  CBC Full  -  ( 2018 06:08 )  WBC Count : 14.22 K/uL  Hemoglobin : 12.3 g/dL  Hematocrit : 38.4 %  Platelet Count - Automated : 170 K/uL  Mean Cell Volume : 88.3 fl  Mean Cell Hemoglobin : 28.3 pg  Mean Cell Hemoglobin Concentration : 32.0 gm/dL  Auto Neutrophil # : 11.04 K/uL  Auto Lymphocyte # : 2.20 K/uL  Auto Monocyte # : 0.73 K/uL  Auto Eosinophil # : 0.00 K/uL  Auto Basophil # : 0.03 K/uL  Auto Neutrophil % : 77.7 %  Auto Lymphocyte % : 15.5 %  Auto Monocyte % : 5.1 %  Auto Eosinophil % : 0.0 %  Auto Basophil % : 0.2 %        139  |  101  |  48<H>  ----------------------------<  114<H>  3.5   |  12<L>  |  1.62<H>    Ca    8.0<L>      2018 13:53  Mg     2.4         TPro  6.4  /  Alb  2.2<L>  /  TBili  9.2<H>  /  DBili  7.48<H>  /  AST  94<H>  /  ALT  106<H>  /  AlkPhos  128<H>      LIVER FUNCTIONS - ( 2018 13:53 )  Alb: 2.2 g/dL / Pro: 6.4 gm/dL / ALK PHOS: 128 U/L / ALT: 106 U/L / AST: 94 U/L / GGT: x           PT/INR - ( 2018 13:59 )   PT: 44.3 sec;   INR: 3.95 ratio       PTT - ( 2018 06:08 )  PTT:28.0 sec      Urinalysis Basic - ( 2018 09:47 )  Color: Magalie / Appearance: very cloudy / S.020 / pH: x  Gluc: x / Ketone: Trace  / Bili: Large / Urobili: 8 mg/dL   Blood: x / Protein: 100 mg/dL / Nitrite: Positive   Leuk Esterase: Moderate / RBC: 11-25 /HPF / WBC 26-50   Sq Epi: x / Non Sq Epi: Moderate / Bacteria: Many      ABG - ( 2018 13:51 )  pH, Arterial: x     pH, Blood: 7.33  /  pCO2: 19    /  pO2: 272   / HCO3: 10    / Base Excess: -14.4 /  SaO2: 98            CARDIAC MARKERS ( 2018 13:53 )  .077 ng/mL / x     / 93 U/L / x     / x        CARDIAC MARKERS ( 2018 06:08 )  .034 ng/mL / x     / 66 U/L / x     / 2.8 ng/mL      HCG Quantitative, Serum: <1 mIU/mL ( @ 13:53)    Rapid HIV-1/2 Antibody: Nonreact ( @ 13:59)      Amylase, Serum Total: 69 U/L ( @ 12:38)    Lipase, Serum: 169 U/L ( @ 12:38)            Radiology:  CXR - < from: Xray Chest 1 View-PORTABLE IMMEDIATE (18 @ 12:31) >  INTERPRETATION:  AP semierect chest on 2018 at 12:16 PM. Patient   was intubated.    Heart is magnified by technique but could be enlarged.    There may be slight central infiltrates developing compared to earlier   study of the same day.    An endotracheal tube and right jugular line are now in place.    IMPRESSION: Tubes in place. Slight central infiltrates are developing.                              CT Head -   < from: CT Head No Cont (18 @ 15:50) >  TECHNIQUE: Noncontrast axial CT head wasobtained from the skull base to   vertex.    FINDINGS:  No acute intracranial hemorrhage, mass effect or midline shift.  No CT evidence of acute large territory vascular infarct.  The ventricles and cortical sulci are within normal limits for age.    The visualized paranasal sinuses and mastoid air cells are well aerated.  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  No displaced calvarial fracture.  Partially visualized left nasoenteric and endotracheal tubes are noted.   Trace retained secretions layer along the posterior nasopharynx.    IMPRESSION:   No acute intracranial hemorrhage or mass effect.                        CT CHEST< ABDOMEN< AND PELVIS -  < from: CT Chest No Cont (18 @ 15:50) >  INTERPRETATION:  CT of the chest, abdomen, and pelvis without contrast    Indication: Shock. Respiratory failure. Jaundice. Elevated bilirubin.    Technique: Axial multidetector CT images of the chest, abdomen, and   pelvis are acquired without IV or oral contrast.    Comparison: None.    Findings:    Chest: Mild bilateral pleural effusions. Small right pericardial   effusion. Cardiomegaly. No aortic aneurysm. Allowing for the noncontrast   technique, there is no grossly enlarged mediastinal, hilar, or axillary   lymph node.    ET tube terminates above the nivia. NG tube terminates in the stomach.    Small densities in the right mainstem bronchus may represent mucous   material.    Mild emphysematous changes in the right lung, suggestive of COPD. Small   bilateral atelectasis. Mild hazy groundglass densities in both lungs may   represent pulmonary edema or pneumonia. No evidence for pneumothorax.    Abdomen: Evaluation of the abdomen and pelvis is limited by lack of IV   and oral contrast. Allowing for noncontrast technique, the liver,   pancreas, spleen, and the right kidney appear grossly unremarkable. There   is a staghorn calculus in the left kidney. Apparent air in the left renal   calyces. No hydronephrosis.    Nonspecific adrenal thickening bilaterally.    The gallbladder appears contracted. Pericholecystic fluid versus   gallbladder wall edema. The common bile duct is not visualized on this   limited examination. If clinically indicated, abdominal ultrasound may be   pursued for further evaluation.    No bowel obstruction, or grossly thickened bowel wall. The appendix is   not identified. No evidence free air, or enlarged lymph node. Mild to   moderate ascites in the abdomen and pelvis.    Pelvis: There is a Lyons catheter in the urinary bladder which contains   air. The urinary bladder is underdistended, rendering evaluation   difficult. The bowel structures appear grossly unremarkable. No grossly   enlarged pelvic lymph node.    There is diffuse body wall edema in the chest, abdomen, and pelvis.    Impression: Anasarca.    Mild bilateral pleural effusions. Small right pericardial effusion.    Small densities in the right mainstem bronchus may represent mucous   material. Follow-up chest CT may be pursued in 4-6 weeks to ensure   clearance.    Mild hazy groundglass densities in both lungs may represent pulmonary   edema or pneumonia. Clinical correlation is recommended.    The gallbladder appears contracted. Pericholecystic fluid versus   gallbladder wall edema. The common bile duct is not visualized on this   limited examination. If clinically indicated, abdominal ultrasound may be   pursued for further evaluation. Alternatively, abdominal MR without and   with IV contrast including MRCP may be pursued.    No bowel obstruction, or grossly thickened bowel wall. The appendix is   not identified.    Mild to moderateascites in the abdomen and pelvis.    Lyons catheter in the urinary bladder. Associated air in the urinary   bladder. Staghorn calculus in the left kidney. Apparent air in the left   renal calyces may be due to reflux from the urinary bladder or may   represent emphysematous pyelitis. Clinical correlation is recommended.        Vital Signs Last 24 Hrs  T(C): 36.1 (2018 15:30), Max: 36.6 (2018 04:40)  T(F): 97 (2018 15:30), Max: 97.8 (2018 04:40)  HR: 73 (2018 14:00) (69 - 102)  BP: 126/92 (2018 14:00) (84/71 - 126/92)  BP(mean): 104 (2018 14:00) (77 - 104)  RR: 24 (2018 14:00) (18 - 24)  SpO2: 100% (2018 13:34) (87% - 100%)  Supplemental O2:  Mode: AC/ CMV (Assist Control/ Continuous Mandatory Ventilation)  RR (machine): 22  TV (machine): 510  FiO2: 60  PEEP: 5  ITime: 0.9  MAP: 13  PIP: 28    PHYSICAL EXAM    General:  53 y/o black female sedated now on ventilator, orally intubated and with NGT in place, lying in bed, appears edematous   HEENT: conj pink, sclerae muddy, PERRLA, orally intubated, unable to visualize oral mucosa, and NGT in place  Neck: semi-supple, RIJ CVP in place - site obscurred, dressing dry and intact - placed today  Heart: RR  Lungs: few rhonchi bilaterally  Abdomen: BS+, semi- soft, + edema of the abdominal wall, with lower abdominal wall ? prior surgical sites with some keloid formation, with warmth and erythema of the lower abdominal/ suprapubic area noted, no palpable masses of HS- megaly detected  Extremities: 2+ edema LE's with chronic skin discoloration and a few areas of denuded skin  Skin: warm, dry, no rash noted        I&O's Summary :    2018 07:01  -  2018 16:46  --------------------------------------------------------  IN: 356.8 mL / OUT: 200 mL / NET: 156.8 mL        Impression:        Suggestions:

## 2018-07-19 NOTE — CONSULT NOTE ADULT - ASSESSMENT
HPI:  See H and P for full details.  See in  ED noted to be in respiratory distress on Bipap with metabolic acidosis.  Given 2 amps bicarb and bicarb drip for metabolic acidosis, lactate 12. Spoke to her at length, she does not doctor has not seen and MD in many years. She was AAand 0 x 4 knows who the president is in spite of her hepatic encephalopathy, Denies ETOH/drugs/Hx of HIV or liver disease. Noted to be in fulminant hepatic failure, t bili 10.7, INR 2.76, AST 84  . Denies Tylenol consumption or any toxic ingestion. Post cardioversion for Afib with RVR earlier today, reportedly lost a pulse. In ICU, made decision to intubate . premedicated with 4 mg mversed, 100 mcg fent, started on propofol at 40. Intubated by myself on first attempt with glidescope 7.5 ETT to lip 21, placed RIJ TLC on first attempt. Bedside echo with RV dilation, severe TR, septal bowing, LV EF appears normal to slighlty decreased, I got LVOT VTI of 10 possibly indicating a component of LV failure that to me seems secondary to a primary RV proscess, possibly portopulmonary HTN. Basically my question is: is this a chronic liver picture causing portopulmonary HTN and RV failure or is this a primary RV failure causing hepatic congestion. Will check formal echo, fractionate the bilis, send HIV, hepatitis and autoimmune work up. Will treat the fulminant hepatic failure with N-acetylcycteine protocol which has good evidence behind it for all forms of fulminant hepatic failure, f/u a tylenol level, add lactulose for the hyperammonemia,Aztrenaom flagyl for intrabdominal proscess in setting of PCN allergey with unknown reaction, check urine electrolytes to R/O hepatorenal syndrome, levophed to keep MAP > 65 mmhg, Check formal echo, trend cardiac enzymes. GI, renal, cards consults. C/W bicarb drip for now is making some urine. Non contrast CT H/C/A/P to evaluate for sources of sepsis.  Prognosis is guarded. She told me she has a daughter named Osvaldo in [place] who she wishes to be her HCP, thou sounds like she may be sestranged from this daughter.  CCM time initial 46 min plus another 1 hour, total 1 hr and 46 minuted included recieving patient from ER, preparation for intubation  intubation, central line placement bedside echo.  ----------------------- as Above --------------------------------------------------------------------------------------------------  Np history obtainable besides above. Intubated unresponsive. Notes reviewed. See Ct scan / sonogram. Bilirubin shows improvement over a short period of time  ---------------- Elevated Bili > transaminases/alk phos  - Seconadry to acute on chronic liver disease , VS side effects of medications. 1) supportive care  2) f/u LFTs 3) work up for underlying liver disease
53yo lady who presented to the ER in respiratory distress / metabolic acidosis / hepatic encephalopathy / hepatic failure.  Started on bicarb gtt.  Intubated after a ?PEA arrest s/p DCCV for Afib with RVR.  Per ICU attending, bedside echo with RV dilation, severe TR, septal bowing, mildly depressed LVEF    ?primary hepatic vs right-sided HF    -supportive care  -mucomyst/bicarb  -intubated  -2D echo to eval LVEF/TR/RV
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

## 2018-07-19 NOTE — H&P ADULT - ATTENDING COMMENTS
initially seemed like fulminant liver failure but over time has clarified to be severe septic shock secondary to emphysematous pyelo. Broad spectrum ABX, AC ventilation, initially required bicarb drip, Id and urology consulted. Dr Frances F/U appreciated for L staghorn calculus. VAsopressors to keep MAP m> 65 which now has been weaned off. attempted to find family but unable to do so. Social work enlisted to help.

## 2018-07-19 NOTE — H&P ADULT - NSHPPHYSICALEXAM_GEN_ALL_CORE
GENERAL: not well kempt, intubated and sedated, awakens and moves extremities off sedation   EYES: PERRLA, conjunctiva and sclera clear  ENMT: dentition poor   NERVOUS SYSTEM:  intubated and sedated. Off sedation motor Strength 5/5 B/L upper and lower extremities; DTRs 2+ intact and symmetric  CHEST/LUNG: Clear to percussion bilaterally; No rales, rhonchi, wheezing, or rubs  HEART: Regular rate and rhythm; No murmurs, rubs, or gallops  ABDOMEN: Soft, Nontender, Nondistended; Bowel sounds present  EXTREMITIES:  2+ Peripheral Pulses, No clubbing, cyanosis, or edema  LYMPH: No lymphadenopathy noted  SKIN: No rashes or lesions GENERAL: not well kempt, intubated and sedated, awakens and moves extremities off sedation   EYES: PERRLA, conjunctiva and sclera clear  ENMT: dentition poor   NERVOUS SYSTEM:  intubated and sedated. Off sedation motor Strength 5/5 B/L upper and lower extremities; DTRs 2+ intact and symmetric  CHEST/LUNG: CTA BL; No rales, rhonchi, wheezing, or rubs  HEART: Regular rate and rhythm; No murmurs, rubs, or gallops  ABDOMEN: Soft, Nontender, Nondistended; Bowel sounds present  EXTREMITIES:  2+ Peripheral Pulses, 4+pitting edema

## 2018-07-19 NOTE — CONSULT NOTE ADULT - SUBJECTIVE AND OBJECTIVE BOX
HPI:   Patient is a 54y old female who presented last night with dyspnea requiring placement on BiPAP. Found to be in severe metabolic acidosis associated with elevated serum lactate. In addition, initial serologic w/u revealed coagulopathy, elevated bili but only modestly elevated transaminases. Acidosis managed with bicarb amps. While in ED, pt developed rapid a.fib --> cardioverted -- > became asystolic briefly. Eventually intubated. Currently in ICU, on bicarb gtt, empiric NAC, low dose vasopressor. Non-oliguric. FiO2 45%.   CT A/P: GB wall edema, staghorn calculus in L [place].         PAST MEDICAL & SURGICAL HISTORY:  Unknown at present    Allergies    penicillin (Unknown)          MEDICATIONS  (STANDING):  acetylcysteine IVPB 10 Gram(s) IV Intermittent once  aztreonam  IVPB      aztreonam  IVPB 1000 milliGRAM(s) IV Intermittent every 8 hours  chlorhexidine 0.12% Liquid 15 milliLiter(s) Swish and Spit two times a day  chlorhexidine 4% Liquid 1 Application(s) Topical <User Schedule>  metroNIDAZOLE  IVPB      metroNIDAZOLE  IVPB 500 milliGRAM(s) IV Intermittent every 8 hours  norepinephrine Infusion 0.05 MICROgram(s)/kG/Min (4.678 mL/Hr) IV Continuous <Continuous>  propofol Infusion 50 MICROgram(s)/kG/Min (29.94 mL/Hr) IV Continuous <Continuous>  sodium bicarbonate  Infusion 0.225 mEq/kG/Hr (150 mL/Hr) IV Continuous <Continuous>    MEDICATIONS  (PRN):      Daily Height in cm: 165.1 (2018 04:40)    Daily     Drug Dosing Weight  Height (cm): 165.1 (2018 04:40)  Weight (kg): 89.5 (2018 11:15)  BMI (kg/m2): 32.8 (2018 11:15)  BSA (m2): 1.97 (2018 11:15)      REVIEW OF SYSTEMS:    Per HPI        ABG - ( 2018 13:51 )  pH, Arterial: x     pH, Blood: 7.33  /  pCO2: 19    /  pO2: 272   / HCO3: 10    / Base Excess: -14.4 /  SaO2: 98                  I&O's Detail    2018 07:01  -  2018 17:15  --------------------------------------------------------  IN:    norepinephrine Infusion: 10 mL    norepinephrine Infusion: 46 mL    propofol Infusion: 37.6 mL    sodium bicarbonate  Infusion: 750 mL  Total IN: 843.6 mL    OUT:    Indwelling Catheter - Urethral: 700 mL  Total OUT: 700 mL    Total NET: 143.6 mL           @ 07: @ 17:15  --------------------------------------------------------  IN: 843.6 mL / OUT: 700 mL / NET: 143.6 mL        PHYSICAL EXAM:    GENERAL: intubated  HEAD:  Atraumatic, Normocephalic  EYES: scleral icterus  NECK: pos JVD  NERVOUS SYSTEM: sedated  CHEST/LUNG: dec BS at bases  HEART: irreg  ABDOMEN: Soft, Nontender, abd wall edema  EXTREMITIES:  pos edema  LYMPH: No lymphadenopathy noted  SKIN: No rashes or lesions    LABS:  CBC Full  -  ( 2018 06:08 )  WBC Count : 14.22 K/uL  Hemoglobin : 12.3 g/dL  Hematocrit : 38.4 %  Platelet Count - Automated : 170 K/uL  Mean Cell Volume : 88.3 fl  Mean Cell Hemoglobin : 28.3 pg  Mean Cell Hemoglobin Concentration : 32.0 gm/dL  Auto Neutrophil # : 11.04 K/uL  Auto Lymphocyte # : 2.20 K/uL  Auto Monocyte # : 0.73 K/uL  Auto Eosinophil # : 0.00 K/uL  Auto Basophil # : 0.03 K/uL  Auto Neutrophil % : 77.7 %  Auto Lymphocyte % : 15.5 %  Auto Monocyte % : 5.1 %  Auto Eosinophil % : 0.0 %  Auto Basophil % : 0.2 %        139  |  101  |  48<H>  ----------------------------<  114<H>  3.5   |  12<L>  |  1.62<H>    Ca    8.0<L>      2018 13:53  Mg     2.4         TPro  6.4  /  Alb  2.2<L>  /  TBili  9.2<H>  /  DBili  7.48<H>  /  AST  94<H>  /  ALT  106<H>  /  AlkPhos  128<H>      CAPILLARY BLOOD GLUCOSE        PT/INR - ( 2018 13:59 )   PT: 44.3 sec;   INR: 3.95 ratio         PTT - ( 2018 06:08 )  PTT:28.0 sec  Urinalysis Basic - ( 2018 09:47 )    Color: Magalie / Appearance: very cloudy / S.020 / pH: x  Gluc: x / Ketone: Trace  / Bili: Large / Urobili: 8 mg/dL   Blood: x / Protein: 100 mg/dL / Nitrite: Positive   Leuk Esterase: Moderate / RBC: 11-25 /HPF / WBC 26-50   Sq Epi: x / Non Sq Epi: Moderate / Bacteria: Many      CARDIAC MARKERS ( 2018 13:53 )  .077 ng/mL / x     / 93 U/L / x     / x      CARDIAC MARKERS ( 2018 06:08 )  .034 ng/mL / x     / 66 U/L / x     / 2.8 ng/mL        Impression:  * SHARYN -- ischemic, septic ATN  * Metabolic acidosis -- lactic  * Septic shock -- urinary vs biliary source  * Emphysematous pyelonephritis  * Direct bilirubinemia   * GABRIEL -- ? PAF  * Coagulopathy    Recommendations:   * Cont supportive care with vasopressors. Keep MAP > 60  * Follow Cr, UO  * Judicious IVFs/bicarb in face of total body volume overload/anasarca  * Broad spectrum empiric ABx  * Urology eval  * Obtain 2D Echo  * Will re-eval in 24h    Thank you!

## 2018-07-19 NOTE — CONSULT NOTE ADULT - SUBJECTIVE AND OBJECTIVE BOX
HPI:      PAST MEDICAL & SURGICAL HISTORY:      Allergies    penicillin (Unknown)    Intolerances        SOCIAL HISTORY  FAMILY HISTORY:      Home Medications:      MEDICATIONS  (STANDING):  acetylcysteine IVPB 10 Gram(s) IV Intermittent once  aztreonam  IVPB      aztreonam  IVPB 1000 milliGRAM(s) IV Intermittent every 8 hours  chlorhexidine 0.12% Liquid 15 milliLiter(s) Swish and Spit two times a day  chlorhexidine 4% Liquid 1 Application(s) Topical <User Schedule>  metroNIDAZOLE  IVPB      metroNIDAZOLE  IVPB 500 milliGRAM(s) IV Intermittent every 8 hours  norepinephrine Infusion 0.05 MICROgram(s)/kG/Min (4.678 mL/Hr) IV Continuous <Continuous>  propofol Infusion 50 MICROgram(s)/kG/Min (29.94 mL/Hr) IV Continuous <Continuous>  sodium bicarbonate  Infusion 0.225 mEq/kG/Hr (150 mL/Hr) IV Continuous <Continuous>    MEDICATIONS  (PRN):      ROS:    pt intubated , history obtained from medical records      Physical Exam:    Vital Signs:  Vital Signs Last 24 Hrs  T(C): 36.1 (2018 15:30), Max: 36.6 (2018 04:40)  T(F): 97 (2018 15:30), Max: 97.8 (2018 04:40)  HR: 72 (2018 17:00) (69 - 102)  BP: 109/76 (2018 17:00) (84/71 - 126/92)  BP(mean): 87 (2018 17:00) (77 - 104)  RR: 22 (2018 17:00) (18 - 24)  SpO2: 100% (2018 16:30) (87% - 100%)  Daily Height in cm: 165.1 (2018 04:40)    Daily   I&O's Summary    2018 07:01  -  2018 17:11  --------------------------------------------------------  IN: 356.8 mL / OUT: 200 mL / NET: 156.8 mL        General:  Appears stated age,  well-nourished, no distress  HEENT:  NC/AT, patent nares w/ pink mucosa, OP moist and pink,   conjunctivae clear  Chest:  Full & symmetric excursion, no increased effort.   Abdomen:  Soft, non-tender, non-distended, normoactive bowel sounds.  Lyons catheter draining well.  Rectal Examination: Deferred.  Extremities:  no edema, pedal pusation are present, no calf tenderness.  Skin:  No rash/erythema  Neuro/Psych:  Alert and conscious. Grossly intact and symmetrical.      LABS:                        12.3   14.22 )-----------( 170      ( 2018 06:08 )             38.4         139  |  101  |  48<H>  ----------------------------<  114<H>  3.5   |  12<L>  |  1.62<H>    Ca    8.0<L>      2018 13:53  Mg     2.4         TPro  6.4  /  Alb  2.2<L>  /  TBili  9.2<H>  /  DBili  7.48<H>  /  AST  94<H>  /  ALT  106<H>  /  AlkPhos  128<H>      PT/INR - ( 2018 13:59 )   PT: 44.3 sec;   INR: 3.95 ratio         PTT - ( 2018 06:08 )  PTT:28.0 sec  Urinalysis Basic - ( 2018 09:47 )    Color: Magalie / Appearance: very cloudy / S.020 / pH: x  Gluc: x / Ketone: Trace  / Bili: Large / Urobili: 8 mg/dL   Blood: x / Protein: 100 mg/dL / Nitrite: Positive   Leuk Esterase: Moderate / RBC: 11-25 /HPF / WBC 26-50   Sq Epi: x / Non Sq Epi: Moderate / Bacteria: Many          RADIOLOGY & ADDITIONAL STUDIES:  EXAM:  CT ABDOMEN AND PELVIS                                          PROCEDURE DATE:  2018          INTERPRETATION:  CT of the chest, abdomen, and pelvis without contrast    Indication: Shock. Respiratory failure. Jaundice. Elevated bilirubin.    Technique: Axial multidetector CT images of the chest, abdomen, and   pelvis are acquired without IV or oral contrast.    Comparison: None.    Findings:    Chest: Mild bilateral pleural effusions. Small right pericardial   effusion. Cardiomegaly. No aortic aneurysm. Allowing for the noncontrast   technique, there is no grossly enlarged mediastinal, hilar, or axillary   lymph node.    ET tube terminates above the nivia. NG tube terminates in the stomach.    Small densities in the right mainstem bronchus may represent mucous   material.    Mild emphysematous changes in the right lung, suggestive of COPD. Small   bilateral atelectasis. Mild hazy groundglass densities in both lungs may   represent pulmonary edema or pneumonia. No evidence for pneumothorax.    Abdomen: Evaluation of the abdomen and pelvis is limited by lack of IV   and oral contrast. Allowing for noncontrast technique, the liver,   pancreas, spleen, and the right kidney appear grossly unremarkable. There   is a staghorn calculus in the left kidney. Apparent air in the left renal   calyces. No hydronephrosis.    Nonspecific adrenal thickening bilaterally.    The gallbladder appears contracted. Pericholecystic fluid versus   gallbladder wall edema. The common bile duct is not visualized on this   limited examination. If clinically indicated, abdominal ultrasound may be   pursued for further evaluation.    No bowel obstruction, or grossly thickened bowel wall. The appendix is   not identified. No evidence free air, or enlarged lymph node. Mild to   moderate ascites in the abdomen and pelvis.    Pelvis: There is a Lyons catheter in the urinary bladder which contains   air. The urinary bladder is underdistended, rendering evaluation   difficult. The bowel structures appear grossly unremarkable. No grossly   enlarged pelvic lymph node.    There is diffuse body wall edema in the chest, abdomen, and pelvis.    Impression: Anasarca.    Mild bilateral pleural effusions. Small right pericardial effusion. HPI:  Patient is a 54 year old female, brought to ED for respiratory distress and hepatic failure with encephalopathy.   S/p cardioversion in ED for afib and intubation s/p cardiac arrest.     Allergies  penicillin (Unknown)    SOCIAL HISTORY: Denies smoking, ETOH use    MEDICATIONS  (STANDING):  acetylcysteine IVPB 10 Gram(s) IV Intermittent once  aztreonam  IVPB      aztreonam  IVPB 1000 milliGRAM(s) IV Intermittent every 8 hours  chlorhexidine 0.12% Liquid 15 milliLiter(s) Swish and Spit two times a day  chlorhexidine 4% Liquid 1 Application(s) Topical <User Schedule>  metroNIDAZOLE  IVPB      metroNIDAZOLE  IVPB 500 milliGRAM(s) IV Intermittent every 8 hours  norepinephrine Infusion 0.05 MICROgram(s)/kG/Min (4.678 mL/Hr) IV Continuous <Continuous>  propofol Infusion 50 MICROgram(s)/kG/Min (29.94 mL/Hr) IV Continuous <Continuous>  sodium bicarbonate  Infusion 0.225 mEq/kG/Hr (150 mL/Hr) IV Continuous <Continuous>      ROS:  pt intubated , history obtained from medical records      Physical Exam:  Vital Signs:  Vital Signs Last 24 Hrs  T(C): 36.1 (2018 15:30), Max: 36.6 (2018 04:40)  T(F): 97 (2018 15:30), Max: 97.8 (2018 04:40)  HR: 72 (2018 17:00) (69 - 102)  BP: 109/76 (2018 17:00) (84/71 - 126/92)  BP(mean): 87 (2018 17:00) (77 - 104)  RR: 22 (2018 17:00) (18 - 24)  SpO2: 100% (2018 16:30) (87% - 100%)  Daily Height in cm: 165.1 (2018 04:40)      General:  Appears stated age,  well-nourished, no distress  HEENT:  NC/AT, patent nares w/ pink mucosa, OP moist and pink,   conjunctivae clear  Chest:  Full & symmetric excursion, no increased effort.   Abdomen:  Soft, non-tender, non-distended, normoactive bowel sounds.  Lyons catheter draining well.  Rectal Examination: Deferred.  Extremities:  no edema, pedal pusation are present, no calf tenderness.  Skin:  No rash/erythema  Neuro/Psych:  Alert and conscious. Grossly intact and symmetrical.      LABS:                        12.3   14.22 )-----------( 170      ( 2018 06:08 )             38.4         139  |  101  |  48<H>  ----------------------------<  114<H>  3.5   |  12<L>  |  1.62<H>    Ca    8.0<L>      2018 13:53  Mg     2.4         TPro  6.4  /  Alb  2.2<L>  /  TBili  9.2<H>  /  DBili  7.48<H>  /  AST  94<H>  /  ALT  106<H>  /  AlkPhos  128<H>      PT/INR - ( 2018 13:59 )   PT: 44.3 sec;   INR: 3.95 ratio         PTT - ( 2018 06:08 )  PTT:28.0 sec  Urinalysis Basic - ( 2018 09:47 )    Color: Magalie / Appearance: very cloudy / S.020 / pH: x  Gluc: x / Ketone: Trace  / Bili: Large / Urobili: 8 mg/dL   Blood: x / Protein: 100 mg/dL / Nitrite: Positive   Leuk Esterase: Moderate / RBC: 11-25 /HPF / WBC 26-50   Sq Epi: x / Non Sq Epi: Moderate / Bacteria: Many          RADIOLOGY & ADDITIONAL STUDIES:  EXAM:  CT ABDOMEN AND PELVIS                                          PROCEDURE DATE:  2018          INTERPRETATION:  CT of the chest, abdomen, and pelvis without contrast    Indication: Shock. Respiratory failure. Jaundice. Elevated bilirubin.    Technique: Axial multidetector CT images of the chest, abdomen, and   pelvis are acquired without IV or oral contrast.    Comparison: None.    Findings:    Chest: Mild bilateral pleural effusions. Small right pericardial   effusion. Cardiomegaly. No aortic aneurysm. Allowing for the noncontrast   technique, there is no grossly enlarged mediastinal, hilar, or axillary   lymph node.    ET tube terminates above the nivia. NG tube terminates in the stomach.    Small densities in the right mainstem bronchus may represent mucous   material.    Mild emphysematous changes in the right lung, suggestive of COPD. Small   bilateral atelectasis. Mild hazy groundglass densities in both lungs may   represent pulmonary edema or pneumonia. No evidence for pneumothorax.    Abdomen: Evaluation of the abdomen and pelvis is limited by lack of IV   and oral contrast. Allowing for noncontrast technique, the liver,   pancreas, spleen, and the right kidney appear grossly unremarkable. There   is a staghorn calculus in the left kidney. Apparent air in the left renal   calyces. No hydronephrosis.    Nonspecific adrenal thickening bilaterally.    The gallbladder appears contracted. Pericholecystic fluid versus   gallbladder wall edema. The common bile duct is not visualized on this   limited examination. If clinically indicated, abdominal ultrasound may be   pursued for further evaluation.    No bowel obstruction, or grossly thickened bowel wall. The appendix is   not identified. No evidence free air, or enlarged lymph node. Mild to   moderate ascites in the abdomen and pelvis.    Pelvis: There is a Lyons catheter in the urinary bladder which contains   air. The urinary bladder is underdistended, rendering evaluation   difficult. The bowel structures appear grossly unremarkable. No grossly   enlarged pelvic lymph node.    There is diffuse body wall edema in the chest, abdomen, and pelvis.    Impression: Anasarca.    Mild bilateral pleural effusions. Small right pericardial effusion.

## 2018-07-19 NOTE — CONSULT NOTE ADULT - SUBJECTIVE AND OBJECTIVE BOX
HPI:  See H and P for full details.  See in  ED noted to be in respiratory distress on Bipap with metabolic acidosis.  Given 2 amps bicarb and bicarb drip for metabolic acidosis, lactate 12. Spoke to her at length, she does not doctor has not seen and MD in many years. She was AAand 0 x 4 knows who the president is in spite of her hepatic encephalopathy, Denies ETOH/drugs/Hx of HIV or liver disease. Noted to be in fulminant hepatic failure, t bili 10.7, INR 2.76, AST 84  . Denies Tylenol consumption or any toxic ingestion. Post cardioversion for Afib with RVR earlier today, reportedly lost a pulse. In ICU, made decision to intubate . premedicated with 4 mg mversed, 100 mcg fent, started on propofol at 40. Intubated by myself on first attempt with glidescope 7.5 ETT to lip 21, placed RIJ TLC on first attempt. Bedside echo with RV dilation, severe TR, septal bowing, LV EF appears normal to slighlty decreased, I got LVOT VTI of 10 possibly indicating a component of LV failure that to me seems secondary to a primary RV proscess, possibly portopulmonary HTN. Basically my question is: is this a chronic liver picture causing portopulmonary HTN and RV failure or is this a primary RV failure causing hepatic congestion. Will check formal echo, fractionate the bilis, send HIV, hepatitis and autoimmune work up. Will treat the fulminant hepatic failure with N-acetylcycteine protocol which has good evidence behind it for all forms of fulminant hepatic failure, f/u a tylenol level, add lactulose for the hyperammonemia,Aztrenaom flagyl for intrabdominal proscess in setting of PCN allergey with unknown reaction, check urine electrolytes to R/O hepatorenal syndrome, levophed to keep MAP > 65 mmhg, Check formal echo, trend cardiac enzymes. GI, renal, cards consults. C/W bicarb drip for now is making some urine. Non contrast CT H/C/A/P to evaluate for sources of sepsis.  Prognosis is guarded. She told me she has a daughter named Osvaldo in [place] who she wishes to be her HCP, thou sounds like she may be sestranged from this daughter.  CCM time initial 46 min plus another 1 hour, total 1 hr and 46 minuted included recieving patient from ER, preparation for intubation  intubation, central line placement bedside echo.  ----------------------- as Above --------------------------------------------------------------------------------------------------  Np history obtainable besides above. Intubated unresponsive. Notes reviewed. See Ct scan / sonogram. Bilirubin shows improvement over a short period of time           PAST MEDICAL & SURGICAL HISTORY:      MEDICATIONS  (STANDING):  acetylcysteine IVPB 10 Gram(s) IV Intermittent once  chlorhexidine 0.12% Liquid 15 milliLiter(s) Swish and Spit two times a day  chlorhexidine 4% Liquid 1 Application(s) Topical <User Schedule>  fluconAZOLE IVPB      fluconAZOLE IVPB 200 milliGRAM(s) IV Intermittent once  meropenem  IVPB      meropenem  IVPB 1000 milliGRAM(s) IV Intermittent once  meropenem  IVPB 1000 milliGRAM(s) IV Intermittent every 8 hours  meropenem  IVPB 1000 milliGRAM(s) IV Intermittent once  meropenem  IVPB 1000 milliGRAM(s) IV Intermittent every 8 hours  meropenem  IVPB      norepinephrine Infusion 0.05 MICROgram(s)/kG/Min (4.678 mL/Hr) IV Continuous <Continuous>  pantoprazole  Injectable 40 milliGRAM(s) IV Push daily  propofol Infusion 50 MICROgram(s)/kG/Min (29.94 mL/Hr) IV Continuous <Continuous>  sodium bicarbonate  Infusion 0.225 mEq/kG/Hr (150 mL/Hr) IV Continuous <Continuous>    MEDICATIONS  (PRN):      Allergies    penicillin (Unknown)    Intolerances        FAMILY HISTORY:      REVIEW OF SYSTEMS:    CONSTITUTIONAL:   ----------UNRESPONSIVE    No fever, weight loss, or fatigue  EYES: No eye pain, visual disturbances, or discharge  ENMT:  No difficulty hearing, tinnitus, vertigo; No sinus or throat pain  NECK: No pain or stiffness  BREASTS: No pain, masses, or nipple discharge  RESPIRATORY: No cough, wheezing, chills or hemoptysis; No shortness of breath  CARDIOVASCULAR: No chest pain, palpitations, dizziness, or leg swelling  GASTROINTESTINAL: See above  GENITOURINARY: No dysuria, frequency, hematuria, or incontinence  NEUROLOGICAL: No headaches, memory loss, loss of strength, numbness, or tremors  SKIN: No itching, burning, rashes, or lesions   LYMPH NODES: No enlarged glands  ENDOCRINE: No heat or cold intolerance; No hair loss  MUSCULOSKELETAL: No joint pain or swelling; No muscle, back, or extremity pain  PSYCHIATRIC: No depression, anxiety, mood swings, or difficulty sleeping  HEME/LYMPH: No easy bruising, or bleeding gums  ALLERGY AND IMMUNOLOGIC: No hives or eczema          SOCIAL HISTORY:    FAMILY HISTORY:      Vital Signs Last 24 Hrs  T(C): 36.1 (2018 15:30), Max: 36.6 (2018 04:40)  T(F): 97 (2018 15:30), Max: 97.8 (2018 04:40)  HR: 73 (2018 17:23) (69 - 102)  BP: 109/76 (2018 17:00) (84/71 - 126/92)  BP(mean): 87 (2018 17:00) (77 - 104)  RR: 22 (2018 17:00) (18 - 24)  SpO2: 100% (2018 17:23) (87% - 100%)    PHYSICAL EXAM:    GENERAL: NAD, well-groomed, well-developed  HEAD:  Atraumatic, Normocephalic  EYES: EOMI, PERRLA, conjunctiva and sclera clear  ENMT: No tonsillar erythema, exudates, or enlargement; Moist mucous membranes, Good dentition, No lesions  NECK: Supple, No JVD, Normal thyroid  NERVOUS SYSTEM:  Unresponsive  CHEST/LUNG: Clear to percussion bilaterally; No rales, rhonchi, wheezing, or rubs  HEART: Regular rate and rhythm; No murmurs, rubs, or gallops  ABDOMEN: Soft, Nontender, Nondistended; Bowel sounds present  EXTREMITIES:  2+ Peripheral Pulses, No clubbing, cyanosis, or edema  LYMPH: No lymphadenopathy noted   RECTAL: Deferred   SKIN: No rashes or lesions    LABS:                        12.3   14.22 )-----------( 170      ( 2018 06:08 )             38.4       CBC:   @ 06:08  WBC  14.22  HGB 12.3  HCT 38.4 Plate 170  MCV 88.3           2018 13:53    139    |  101    |  48     ----------------------------<  114    3.5     |  12     |  1.62   2018 06:08    134    |  102    |  49     ----------------------------<  75     4.2     |  7      |  1.79     Ca    8.0        2018 13:53  Ca    8.7        2018 06:08  Mg     2.4       2018 06:08    TPro  6.4    /  Alb  2.2    /  TBili  9.2    /  DBili  7.48   /  AST  94     /  ALT  106    /  AlkPhos  128    2018 13:53  TPro  7.7    /  Alb  2.7    /  TBili  10.7   /  DBili  x      /  AST  84     /  ALT  113    /  AlkPhos  171    2018 06:08    PT/INR - ( 2018 13:59 )   PT: 44.3 sec;   INR: 3.95 ratio         PTT - ( 2018 06:08 )  PTT:28.0 sec  Urinalysis Basic - ( 2018 09:47 )    Color: Magalie / Appearance: very cloudy / S.020 / pH: x  Gluc: x / Ketone: Trace  / Bili: Large / Urobili: 8 mg/dL   Blood: x / Protein: 100 mg/dL / Nitrite: Positive   Leuk Esterase: Moderate / RBC: 11-25 /HPF / WBC 26-50   Sq Epi: x / Non Sq Epi: Moderate / Bacteria: Many          RADIOLOGY & ADDITIONAL STUDIES:
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
## 2018-07-19 NOTE — H&P ADULT - ASSESSMENT
53 yo F with no known PMH, poor historian, not well kempt, does not see doctors regularly, with ?new onset a-fib with RVR, s/p cardioversion, admitted to the ICU now intubated on mechanical ventilation support for increased WOB and tachypnea likely 2/2 to respiratory compensation in the setting of severe HAGMA with LA of 12.2 and HCO3 of 7 now on Bicarb gtt, hypotension with sepsis/septic shock ?2/2 to nephrolithiasis with staghorn  vs. ?HRS vs. CRS, SHARYN likely in the setting of ischemic atn, fulminant liver failure.      Plan    1.  Severe HAGMA with lactic acidosis in the setting of liver failure and hypoperfusion   -Pt now s/p 2 amps Bicarb and Bicarb gtt 150mEq   -Serial ABG's, LA, CMP trending bicarb   -Trend LFTs    2.  Shock state 2/2 to ?sepsis/septic shock sepsis/septic shock in the setting of nephrolithiasis with staghorn  vs. ?HRS vs. ?CRS   -30 cc/kg fluid challenge without improvement in blood pressure  -patient currently on Levophed, will titrate for MAP 65-70  -trend lactate  -blood/urine/sputum cultures, urine legionella then initiate broad spectrum antibiotics  -follow cultures and narrow antibiotics as able  -goal UOP >0.5 cc/kg/hr  -procalcitonin  -Trend LFT's pt on NAC protocol in the setting of fulminant liver 53 yo F with no known PMH, poor historian, not well kempt, does not see doctors regularly, with ?new onset a-fib with RVR, s/p cardioversion, admitted to the ICU now intubated on mechanical ventilation support for increased WOB and tachypnea likely 2/2 to respiratory compensation in the setting of severe HAGMA with LA of 12.2 and HCO3 of 7 now on Bicarb gtt, hypotension with sepsis/septic shock ?2/2 to nephrolithiasis with staghorn  vs. ?HRS vs. CRS, SHARYN likely in the setting of ischemic atn, fulminant liver failure.      Plan    1.  Severe HAGMA with lactic acidosis in the setting of liver failure and hypoperfusion   -Pt now s/p 2 amps Bicarb and Bicarb gtt 150mEq   -Serial ABG's, LA, CMP trending bicarb   -Trend LFTs    2.  Shock state 2/2 to ?sepsis/septic shock sepsis/septic shock in the setting of nephrolithiasis with staghorn  vs. ?HRS vs. ?CRS   -30 cc/kg fluid challenge without improvement in blood pressure  -patient currently on Levophed, will titrate for MAP 65-70  -trend lactate  -blood/urine/sputum cultures, urine legionella then initiate broad spectrum antibiotics  -follow cultures and narrow antibiotics as able  -goal UOP >0.5 cc/kg/hr  -procalcitonin  -Trend LFT's pt on NAC protocol in the setting of fulminant liver faillure    3.  Intubated on mechanical ventilation support for increased WOB and tachypnea likely 2/2 to respiratory compensation in the setting of severe HAGMA   -Patient currently on Full vent support  -titrate settings to maintain SaO2 >90%, or pH >7.25  -6-8 cc/kg of ideal body weight  -plateau pressure goal <30  -Peridex oral care and VAP prophylaxis with HOB 30 degrees   -aggressive chest PT and suctioning   -daily sedation vacation with spontaneous breathing trial if clinical condition warrants, discuss with respiratory therapy
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
## 2018-07-19 NOTE — H&P ADULT - NSHPLABSRESULTS_GEN_ALL_CORE
Lab Results:  CBC  CBC Full  -  ( 2018 06:08 )  WBC Count : 14.22 K/uL  Hemoglobin : 12.3 g/dL  Hematocrit : 38.4 %  Platelet Count - Automated : 170 K/uL  Mean Cell Volume : 88.3 fl  Mean Cell Hemoglobin : 28.3 pg  Mean Cell Hemoglobin Concentration : 32.0 gm/dL  Auto Neutrophil # : 11.04 K/uL  Auto Lymphocyte # : 2.20 K/uL  Auto Monocyte # : 0.73 K/uL  Auto Eosinophil # : 0.00 K/uL  Auto Basophil # : 0.03 K/uL  Auto Neutrophil % : 77.7 %  Auto Lymphocyte % : 15.5 %  Auto Monocyte % : 5.1 %  Auto Eosinophil % : 0.0 %  Auto Basophil % : 0.2 %    .		Differential:	[] Automated		[] Manual  Chemistry                        12.3   14. )-----------( 170      ( 2018 06:08 )             38.4     07-19    137  |  101  |  47<H>  ----------------------------<  206<H>  3.2<L>   |  22  |  1.46<H>    Ca    7.8<L>      2018 21:36  Mg     2.4     -    TPro  6.1  /  Alb  2.1<L>  /  TBili  7.8<H>  /  DBili  x   /  AST  99<H>  /  ALT  103<H>  /  AlkPhos  113  07-19    LIVER FUNCTIONS - ( 2018 21:36 )  Alb: 2.1 g/dL / Pro: 6.1 gm/dL / ALK PHOS: 113 U/L / ALT: 103 U/L / AST: 99 U/L / GGT: x           PT/INR - ( 2018 21:36 )   PT: 35.7 sec;   INR: 3.20 ratio         PTT - ( 2018 06:08 )  PTT:28.0 sec  Urinalysis Basic - ( 2018 09:47 )    Color: Magalie / Appearance: very cloudy / S.020 / pH: x  Gluc: x / Ketone: Trace  / Bili: Large / Urobili: 8 mg/dL   Blood: x / Protein: 100 mg/dL / Nitrite: Positive   Leuk Esterase: Moderate / RBC: 11-25 /HPF / WBC 26-50   Sq Epi: x / Non Sq Epi: Moderate / Bacteria: Many        ABG - ( 2018 22:21 )  pH, Arterial: x     pH, Blood: 7.58  /  pCO2: 21    /  pO2: 215   / HCO3: 19    / Base Excess: -1.3  /  SaO2: 100         MICROBIOLOGY/CULTURES:  pending     RADIOLOGY RESULTS:  < from: CT Abdomen and Pelvis No Cont (18 @ 15:59) >    Impression: Anasarca.  Mild bilateral pleural effusions. Small right pericardial effusion.  Small densities in the right mainstem bronchus may represent mucous   material. Follow-up chest CT may be pursued in 4-6 weeks to ensure   clearance.  Mild hazy groundglass densities in both lungs may represent pulmonary   edema or pneumonia. Clinical correlation is recommended.  The gallbladder appears contracted. Pericholecystic fluid versus   gallbladder wall edema. The common bile duct is not visualized on this   limited examination. If clinically indicated, abdominal ultrasound may be   pursued for further evaluation. Alternatively, abdominal MR without and   with IV contrast including MRCP may be pursued.    No bowel obstruction, or grossly thickened bowel wall. The appendix is   not identified.    Mild to moderate ascites in the abdomen and pelvis.  Lyons catheter in the urinary bladder. Associated air in the urinary   bladder. Staghorn calculus in the left kidney. Apparent air in the left   renal calyces may be due to reflux from the urinary bladder or may   represent emphysematous pyelitis. Clinical correlation is recommended.  < end of copied text >  < from: US Abdomen Complete (18 @ 16:58) >  IMPRESSION:     Hepatomegaly.  Nonspecific gallbladder wall thickening.   No cholelithiasis or biliary ductal dilatation.  Left nephrolithiasis.    < end of copied text >

## 2018-07-19 NOTE — PROVIDER CONTACT NOTE (CRITICAL VALUE NOTIFICATION) - NS PROVIDER READ BACK
Patient was changed to generic Gerri in 03/2018. She was given 3 packages and was told to observe effect. She states she feels great, she has had no poor side effects. She would like to continue the birth control. Refill sent to pharmacy. Routed to Dr. Elbert Cohen for Fina Guillory. yes

## 2018-07-19 NOTE — PROGRESS NOTE ADULT - SUBJECTIVE AND OBJECTIVE BOX
See H and P for full details.  See in  ED noted to be in respiratory distress on Bipap with metabolic acidosis.  Given 2 amps bicarb and bicarb drip for metabolic acidosis, lactate 12. Spoke to her at length, she does not doctor has not seen and MD in many years. She was AAand 0 x 4 knows who the president is in spite of her hepatic encephalopathy, Denies ETOH/drugs/Hx of HIV or liver disease. Noted to be in fulminant hepatic failure, t bili 10.7, INR 2.76, AST 84  . Denies Tylenol consumption or any toxic ingestion. Post cardioversion for Afib with RVR earlier today, reportedly lost a pulse. In ICU, made decision to intubate . premedicated with 4 mg mversed, 100 mcg fent, started on propofol at 40. Intubated by myself on first attempt with glidescope 7.5 ETT to lip 21, placed RIJ TLC on first attempt. Bedside echo with RV dilation, severe TR, septal bowing, LV EF appears normal to slighlty decreased, I got LVOT VTI of 10 possibly indicating a component of LV failure that to me seems secondary to a primary RV proscess, possibly portopulmonary HTN. Basically my question is: is this a chronic liver picture causing portopulmonary HTN and RV failure or is this a primary RV failure causing hepatic congestion. Will check formal echo, fractionate the bilis, send HIV, hepatitis and autoimmune work up. Will treat the fulminant hepatic failure with N-acetylcycteine protocol which has good evidence behind it for all forms of fulminant hepatic failure, f/u a tylenol level, add lactulose for the hyperammonemia,Aztrenaom flagyl for intrabdominal proscess in setting of PCN allergey with unknown reaction, check urine electrolytes to R/O hepatorenal syndrome, levophed to keep MAP > 65 mmhg, Check formal echo, trend cardiac enzymes. GI, renal, cards consults. C/W bicarb drip for now is making some urine. Non contrast CT H/C/A/P to evaluate for sources of sepsis.  Prognosis is guarded. She told me she has a daughter named Osvaldo in [place] who she wishes to be her HCP, thou sounds like she may be sestranged from this daughter.  CCM time initial 46 min plus another 1 hour, total 1 hr and 46 minuted included recieving patient from ER, preparation for intubation  intubation, central line placement bedside echo.             I

## 2018-07-19 NOTE — ED PROVIDER NOTE - MEDICAL DECISION MAKING DETAILS
Patient with rapid atrial fib, edema, and jaundice.  VSS on arrival.  Patient receiving fluids or elevated lactic acid, pending more labs and imaging.  Suspicion for hepatorenal failure.  ICU consult pending for admission.  Patient signed out to incoming physician.  All decisions regarding the progression of care will be made at their discretion. Patient with rapid atrial fib, edema, and jaundice.  VSS on arrival.  Patient receiving fluids or elevated lactic acid, pending more labs, ABG, and imaging.  Started on BiPAP for work of breathing.  Suspicion for hepatorenal failure.  ICU consult pending for admission.  Patient signed out to incoming physician.  All decisions regarding the progression of care will be made at their discretion.
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
## 2018-07-20 DIAGNOSIS — N10 ACUTE PYELONEPHRITIS: ICD-10-CM

## 2018-07-20 DIAGNOSIS — R57.9 SHOCK, UNSPECIFIED: ICD-10-CM

## 2018-07-20 DIAGNOSIS — J96.01 ACUTE RESPIRATORY FAILURE WITH HYPOXIA: ICD-10-CM

## 2018-07-20 DIAGNOSIS — R17 UNSPECIFIED JAUNDICE: ICD-10-CM

## 2018-07-20 LAB
-  COAGULASE NEGATIVE STAPHYLOCOCCUS: SIGNIFICANT CHANGE UP
ALBUMIN SERPL ELPH-MCNC: 2.2 G/DL — LOW (ref 3.3–5)
ALP SERPL-CCNC: 115 U/L — SIGNIFICANT CHANGE UP (ref 40–120)
ALT FLD-CCNC: 105 U/L — HIGH (ref 12–78)
AMMONIA BLD-MCNC: 29 UMOL/L — SIGNIFICANT CHANGE UP (ref 11–32)
AMYLASE P1 CFR SERPL: 60 U/L — SIGNIFICANT CHANGE UP (ref 25–115)
ANA PAT FLD IF-IMP: ABNORMAL
ANA TITR SER: ABNORMAL
ANION GAP SERPL CALC-SCNC: 12 MMOL/L — SIGNIFICANT CHANGE UP (ref 5–17)
AST SERPL-CCNC: 103 U/L — HIGH (ref 15–37)
BASE EXCESS BLDA CALC-SCNC: 6.6 MMOL/L — HIGH (ref -2–2)
BASE EXCESS BLDA CALC-SCNC: 7 MMOL/L — HIGH (ref -2–2)
BILIRUB SERPL-MCNC: 7.5 MG/DL — HIGH (ref 0.2–1.2)
BLOOD GAS COMMENTS: SIGNIFICANT CHANGE UP
BLOOD GAS SOURCE: SIGNIFICANT CHANGE UP
BLOOD GAS SOURCE: SIGNIFICANT CHANGE UP
BUN SERPL-MCNC: 46 MG/DL — HIGH (ref 7–23)
CALCIUM SERPL-MCNC: 8.1 MG/DL — LOW (ref 8.5–10.1)
CHLORIDE SERPL-SCNC: 101 MMOL/L — SIGNIFICANT CHANGE UP (ref 96–108)
CO2 SERPL-SCNC: 25 MMOL/L — SIGNIFICANT CHANGE UP (ref 22–31)
CREAT ?TM UR-MCNC: 54 MG/DL — SIGNIFICANT CHANGE UP
CREAT SERPL-MCNC: 1.3 MG/DL — SIGNIFICANT CHANGE UP (ref 0.5–1.3)
CRP SERPL-MCNC: 3.17 MG/DL — HIGH (ref 0–0.4)
CULTURE RESULTS: SIGNIFICANT CHANGE UP
ERYTHROCYTE [SEDIMENTATION RATE] IN BLOOD: 7 MM/HR — SIGNIFICANT CHANGE UP (ref 0–20)
FERRITIN SERPL-MCNC: 125 NG/ML — SIGNIFICANT CHANGE UP (ref 15–150)
FSP PPP-MCNC: >=20 UG/ML
GLUCOSE SERPL-MCNC: 162 MG/DL — HIGH (ref 70–99)
HAPTOGLOB SERPL-MCNC: 83 MG/DL — SIGNIFICANT CHANGE UP (ref 34–200)
HBA1C BLD-MCNC: 5 % — SIGNIFICANT CHANGE UP (ref 4–5.6)
HCO3 BLDA-SCNC: 28 MMOL/L — SIGNIFICANT CHANGE UP (ref 21–29)
HCO3 BLDA-SCNC: 28 MMOL/L — SIGNIFICANT CHANGE UP (ref 21–29)
HCT VFR BLD CALC: 29.4 % — LOW (ref 34.5–45)
HGB BLD-MCNC: 9.9 G/DL — LOW (ref 11.5–15.5)
HOROWITZ INDEX BLDA+IHG-RTO: 45 — SIGNIFICANT CHANGE UP
HOROWITZ INDEX BLDA+IHG-RTO: 45 — SIGNIFICANT CHANGE UP
INR BLD: 2.77 RATIO — HIGH (ref 0.88–1.16)
IRON SATN MFR SERPL: 25 UG/DL — LOW (ref 30–160)
IRON SATN MFR SERPL: 6 % — LOW (ref 14–50)
LACTATE SERPL-SCNC: 1.7 MMOL/L — SIGNIFICANT CHANGE UP (ref 0.7–2)
LEGIONELLA AG UR QL: NEGATIVE — SIGNIFICANT CHANGE UP
LIDOCAIN IGE QN: 270 U/L — SIGNIFICANT CHANGE UP (ref 73–393)
MAGNESIUM SERPL-MCNC: 2 MG/DL — SIGNIFICANT CHANGE UP (ref 1.6–2.6)
MCHC RBC-ENTMCNC: 28.4 PG — SIGNIFICANT CHANGE UP (ref 27–34)
MCHC RBC-ENTMCNC: 33.7 GM/DL — SIGNIFICANT CHANGE UP (ref 32–36)
MCV RBC AUTO: 84.5 FL — SIGNIFICANT CHANGE UP (ref 80–100)
METHOD TYPE: SIGNIFICANT CHANGE UP
MITOCHONDRIA AB SER-ACNC: SIGNIFICANT CHANGE UP
NRBC # BLD: 0 /100 WBCS — SIGNIFICANT CHANGE UP (ref 0–0)
OSMOLALITY SERPL: 306 MOS/KG — HIGH (ref 275–300)
OSMOLALITY UR: 337 MOS/KG — SIGNIFICANT CHANGE UP (ref 50–1200)
PCO2 BLDA: 26 MMHG — LOW (ref 32–46)
PCO2 BLDA: 30 MMHG — LOW (ref 32–46)
PH BLD: 7.58 — HIGH (ref 7.35–7.45)
PH BLD: 7.64 — CRITICAL HIGH (ref 7.35–7.45)
PHOSPHATE SERPL-MCNC: 2 MG/DL — LOW (ref 2.5–4.5)
PLATELET # BLD AUTO: 157 K/UL — SIGNIFICANT CHANGE UP (ref 150–400)
PO2 BLDA: 183 MMHG — HIGH (ref 74–108)
PO2 BLDA: 200 MMHG — HIGH (ref 74–108)
POTASSIUM SERPL-MCNC: 3.6 MMOL/L — SIGNIFICANT CHANGE UP (ref 3.5–5.3)
POTASSIUM SERPL-SCNC: 3.6 MMOL/L — SIGNIFICANT CHANGE UP (ref 3.5–5.3)
POTASSIUM UR-SCNC: 24 MMOL/L — SIGNIFICANT CHANGE UP
PROCALCITONIN SERPL-MCNC: 0.78 NG/ML — HIGH (ref 0.02–0.1)
PROT SERPL-MCNC: 6.4 GM/DL — SIGNIFICANT CHANGE UP (ref 6–8.3)
PROTHROM AB SERPL-ACNC: 30.8 SEC — HIGH (ref 9.8–12.7)
RBC # BLD: 3.48 M/UL — LOW (ref 3.8–5.2)
RBC # FLD: 24.2 % — HIGH (ref 10.3–14.5)
SAO2 % BLDA: 100 % — HIGH (ref 92–96)
SAO2 % BLDA: 100 % — HIGH (ref 92–96)
SMOOTH MUSCLE AB SER-ACNC: SIGNIFICANT CHANGE UP
SODIUM SERPL-SCNC: 138 MMOL/L — SIGNIFICANT CHANGE UP (ref 135–145)
SODIUM UR-SCNC: 65 MMOL/L — SIGNIFICANT CHANGE UP
SPECIMEN SOURCE: SIGNIFICANT CHANGE UP
TIBC SERPL-MCNC: 386 UG/DL — SIGNIFICANT CHANGE UP (ref 220–430)
UIBC SERPL-MCNC: 361 UG/DL — SIGNIFICANT CHANGE UP (ref 110–370)
VANCOMYCIN TROUGH SERPL-MCNC: 7.5 UG/ML — LOW (ref 10–20)
WBC # BLD: 14.19 K/UL — HIGH (ref 3.8–10.5)
WBC # FLD AUTO: 14.19 K/UL — HIGH (ref 3.8–10.5)

## 2018-07-20 PROCEDURE — 99291 CRITICAL CARE FIRST HOUR: CPT

## 2018-07-20 PROCEDURE — 71045 X-RAY EXAM CHEST 1 VIEW: CPT | Mod: 26

## 2018-07-20 PROCEDURE — 93010 ELECTROCARDIOGRAM REPORT: CPT

## 2018-07-20 PROCEDURE — 99233 SBSQ HOSP IP/OBS HIGH 50: CPT

## 2018-07-20 RX ORDER — SODIUM CHLORIDE 9 MG/ML
1000 INJECTION, SOLUTION INTRAVENOUS
Qty: 0 | Refills: 0 | Status: DISCONTINUED | OUTPATIENT
Start: 2018-07-20 | End: 2018-07-23

## 2018-07-20 RX ORDER — VANCOMYCIN HCL 1 G
VIAL (EA) INTRAVENOUS
Qty: 0 | Refills: 0 | Status: DISCONTINUED | OUTPATIENT
Start: 2018-07-20 | End: 2018-08-09

## 2018-07-20 RX ORDER — VANCOMYCIN HCL 1 G
750 VIAL (EA) INTRAVENOUS ONCE
Qty: 0 | Refills: 0 | Status: COMPLETED | OUTPATIENT
Start: 2018-07-20 | End: 2018-07-20

## 2018-07-20 RX ORDER — VANCOMYCIN HCL 1 G
750 VIAL (EA) INTRAVENOUS EVERY 12 HOURS
Qty: 0 | Refills: 0 | Status: DISCONTINUED | OUTPATIENT
Start: 2018-07-21 | End: 2018-08-09

## 2018-07-20 RX ORDER — SODIUM CHLORIDE 9 MG/ML
1000 INJECTION, SOLUTION INTRAVENOUS ONCE
Qty: 0 | Refills: 0 | Status: DISCONTINUED | OUTPATIENT
Start: 2018-07-20 | End: 2018-07-20

## 2018-07-20 RX ADMIN — SODIUM CHLORIDE 80 MILLILITER(S): 9 INJECTION, SOLUTION INTRAVENOUS at 11:45

## 2018-07-20 RX ADMIN — PROPOFOL 29.94 MICROGRAM(S)/KG/MIN: 10 INJECTION, EMULSION INTRAVENOUS at 17:04

## 2018-07-20 RX ADMIN — Medication 150 MEQ/KG/HR: at 06:18

## 2018-07-20 RX ADMIN — MEROPENEM 100 MILLIGRAM(S): 1 INJECTION INTRAVENOUS at 06:18

## 2018-07-20 RX ADMIN — MEROPENEM 100 MILLIGRAM(S): 1 INJECTION INTRAVENOUS at 21:57

## 2018-07-20 RX ADMIN — CHLORHEXIDINE GLUCONATE 1 APPLICATION(S): 213 SOLUTION TOPICAL at 06:00

## 2018-07-20 RX ADMIN — PROPOFOL 29.94 MICROGRAM(S)/KG/MIN: 10 INJECTION, EMULSION INTRAVENOUS at 00:51

## 2018-07-20 RX ADMIN — CHLORHEXIDINE GLUCONATE 15 MILLILITER(S): 213 SOLUTION TOPICAL at 06:00

## 2018-07-20 RX ADMIN — FLUCONAZOLE 100 MILLIGRAM(S): 150 TABLET ORAL at 17:04

## 2018-07-20 RX ADMIN — PANTOPRAZOLE SODIUM 40 MILLIGRAM(S): 20 TABLET, DELAYED RELEASE ORAL at 11:45

## 2018-07-20 RX ADMIN — Medication 40 MILLIEQUIVALENT(S): at 00:46

## 2018-07-20 RX ADMIN — MEROPENEM 100 MILLIGRAM(S): 1 INJECTION INTRAVENOUS at 13:19

## 2018-07-20 RX ADMIN — CHLORHEXIDINE GLUCONATE 15 MILLILITER(S): 213 SOLUTION TOPICAL at 17:18

## 2018-07-20 RX ADMIN — Medication 62.5 MILLIMOLE(S): at 12:40

## 2018-07-20 RX ADMIN — Medication 250 MILLIGRAM(S): at 17:31

## 2018-07-20 NOTE — PROGRESS NOTE ADULT - ASSESSMENT
55yo lady who presented to the ER in respiratory distress / metabolic acidosis / hepatic encephalopathy / hepatic failure.  Intubated after a ?PEA arrest s/p DCCV for Afib with RVR.  Per ICU attending, bedside echo with RV dilation, severe TR, septal bowing, mildly depressed LVEF  Possible right-sided HF    MEDICATIONS  (STANDING):  azithromycin  IVPB 500 milliGRAM(s) IV Intermittent every 24 hours  chlorhexidine 0.12% Liquid 15 milliLiter(s) Swish and Spit two times a day  chlorhexidine 4% Liquid 1 Application(s) Topical <User Schedule>   fluconAZOLE IVPB 200 milliGRAM(s) IV Intermittent every 24 hours  lactated ringers. 1000 milliLiter(s) (80 mL/Hr) IV Continuous <Continuous>  meropenem  IVPB 1000 milliGRAM(s) IV Intermittent every 8 hours     norepinephrine Infusion 0.05 MICROgram(s)/kG/Min (4.678 mL/Hr) IV Continuous <Continuous>  pantoprazole  Injectable 40 milliGRAM(s) IV Push daily  propofol Infusion 50 MICROgram(s)/kG/Min (29.94 mL/Hr) IV Continuous <Continuous>    Echo pending.  Supportive care.    Rip Gaston MD, FACC

## 2018-07-20 NOTE — PROGRESS NOTE ADULT - SUBJECTIVE AND OBJECTIVE BOX
HPI:  53yo lady who presented to the ER in respiratory distress / metabolic acidosis / hepatic encephalopathy / hepatic failure.  Intubated after a ?PEA arrest s/p DCCV for Afib with RVR.  Per ICU attending, bedside echo with RV dilation, severe TR, septal bowing, mildly depressed LVEF    REVIEW OF SYSTEMS:  unable to obtain from pt      PHYSICAL EXAMINATION:  -----------------------------  Vital Signs Last 24 Hrs  T(C): 37.1 (20 Jul 2018 12:30), Max: 37.2 (20 Jul 2018 07:54)  T(F): 98.8 (20 Jul 2018 12:30), Max: 98.9 (20 Jul 2018 07:54)  HR: 80 (20 Jul 2018 14:00) (68 - 91)  BP: 102/71 (20 Jul 2018 14:00) (86/66 - 116/84)  BP(mean): 81 (20 Jul 2018 14:00) (73 - 96)  RR: 16 (20 Jul 2018 14:00) (10 - 24)  SpO2: 100% (20 Jul 2018 14:00) (100% - 100%)    Constitutional: intubated  Eyes: the conjunctiva exhibited no abnormalities and the eyelids demonstrated no xanthelasmas.   HEENT: normal oral mucosa, no oral pallor and no oral cyanosis.   Neck: normal jugular venous A waves present, normal jugular venous V waves present and no jugular venous mcknight A waves.   Pulmonary: no respiratory distress on vent support, normal respiratory rhythm and effort, no accessory muscle use and lungs were clear  Cardiovascular: heart rate and rhythm were normal, normal S1 and S2 and no murmur  Abdomen: soft, non-tender, no hepato-splenomegaly  Musculoskeletal: the gait could not be assessed.  Extremities: no clubbing of the fingernails, no localized cyanosis  Skin: normal skin color and pigmentation, no rash, no venous stasis    LABS:   --------                        9.9    14.19 )-----------( 157      ( 20 Jul 2018 02:15 )             29.4     07-20    138  |  101  |  46<H>  ----------------------------<  162<H>  3.6   |  25  |  1.30    Ca    8.1<L>      20 Jul 2018 02:15  Phos  2.0     07-20  Mg     2.0     07-20    TPro  6.4  /  Alb  2.2<L>  /  TBili  7.5<H>  /  DBili  x   /  AST  103<H>  /  ALT  105<H>  /  AlkPhos  115  07-20 07-19 @ 06:08 CPK total:--, CKMB --, Troponin I - .034 ng/mL      < from: CT Abdomen and Pelvis No Cont (07.19.18 @ 15:59) >  PROCEDURE DATE:  07/19/2018          INTERPRETATION:  CT of the chest, abdomen, and pelvis without contrast    Indication: Shock. Respiratory failure. Jaundice. Elevated bilirubin.    Technique: Axial multidetector CT images of the chest, abdomen, and   pelvis are acquired without IV or oral contrast.    Comparison: None.    Findings:    Chest: Mild bilateral pleural effusions. Small right pericardial   effusion. Cardiomegaly. No aortic aneurysm. Allowing for the noncontrast   technique, there is no grossly enlarged mediastinal, hilar, or axillary   lymph node.    ET tube terminates above the nivia. NG tube terminates in the stomach.    Small densities in the right mainstem bronchus may represent mucous   material.    Mild emphysematous changes in the right lung, suggestive of COPD. Small   bilateral atelectasis. Mild hazy groundglass densities in both lungs may   represent pulmonary edema or pneumonia. No evidence for pneumothorax.    Abdomen: Evaluation of the abdomen and pelvis is limited by lack of IV   and oral contrast. Allowing for noncontrast technique, the liver,   pancreas, spleen, and the right kidney appear grossly unremarkable. There   is a staghorn calculus in the left kidney. Apparent air in the left renal   calyces. No hydronephrosis.    Nonspecific adrenal thickening bilaterally.    The gallbladder appears contracted. Pericholecystic fluid versus   gallbladder wall edema. The common bile duct is not visualized on this   limited examination. If clinically indicated, abdominal ultrasound may be   pursued for further evaluation.    No bowel obstruction, or grossly thickened bowel wall. The appendix is   not identified. No evidence free air, or enlarged lymph node. Mild to   moderate ascites in the abdomen and pelvis.    Pelvis: There is a Lyons catheter in the urinary bladder which contains   air. The urinary bladder is underdistended, rendering evaluation   difficult. The bowel structures appear grossly unremarkable. No grossly   enlarged pelvic lymph node.    There is diffuse body wall edema in the chest, abdomen, and pelvis.    Impression: Anasarca.    Mild bilateral pleural effusions. Small right pericardial effusion.    Small densities in the right mainstem bronchus may represent mucous   material. Follow-up chest CT may be pursued in 4-6 weeks to ensure   clearance.    Mild hazy groundglass densities in both lungs may represent pulmonary   edema or pneumonia. Clinical correlation is recommended.    The gallbladder appears contracted. Pericholecystic fluid versus   gallbladder wall edema. The common bile duct is not visualized on this   limited examination. If clinically indicated, abdominal ultrasound may be   pursued for further evaluation. Alternatively, abdominal MR without and   with IV contrast including MRCP may be pursued.    No bowel obstruction, or grossly thickened bowel wall. The appendix is   not identified.    Mild to moderate ascites in the abdomen and pelvis.    Lyons catheter in the urinary bladder. Associated air in the urinary   bladder. Staghorn calculus in the left kidney. Apparent air in the left   renal calyces may be due to reflux from the urinary bladder or may   represent emphysematous pyelitis. Clinical correlation is recommended.    < end of copied text >    < from: US Duplex Venous Lower Ext Complete, Bilateral (07.19.18 @ 16:51) >  IMPRESSION:     No evidence of bilateral lower extremity deep venous thrombosis.    < end of copied text >

## 2018-07-20 NOTE — PROGRESS NOTE ADULT - PROBLEM SELECTOR PLAN 1
7.5 / 103 / 105 / 115. Bilirubin better. Bilirubin out of proportion to other LFTs. No signs of hemochromatosis. Other blood work pending.  - Supportive care for now

## 2018-07-20 NOTE — PROGRESS NOTE ADULT - PROBLEM SELECTOR PLAN 3
alkalemic on gas, D/Derick bicarb drip and decreased RR from 16 to 12 on vent will repeat ABG.  CCM time 45 min

## 2018-07-20 NOTE — DIETITIAN INITIAL EVALUATION ADULT. - PHYSICAL APPEARANCE
obese/BMI=32.8; +3 Gen edema, +4 edema Zbigniew legs, zbigniew feet & ankle; pt slightly Jaundice & Firm, distended abdomen

## 2018-07-20 NOTE — DIETITIAN INITIAL EVALUATION ADULT. - NS AS NUTRI INTERV ENTERAL NUTRITION
Schedule/Recommend start NGT feeding when medically feasible Vital AF 1.2 @ 30ml/hr to goal rate 50ml/fr=9695jx, 1440kcal & 90gm protein/Concentration/Rate/Composition/Volume/Route

## 2018-07-20 NOTE — PROGRESS NOTE ADULT - SUBJECTIVE AND OBJECTIVE BOX
intubated;  sedated; pressor support.    MEDICATIONS  (STANDING):  chlorhexidine 0.12% Liquid 15 milliLiter(s) Swish and Spit two times a day  chlorhexidine 4% Liquid 1 Application(s) Topical <User Schedule>  fluconAZOLE IVPB      fluconAZOLE IVPB 200 milliGRAM(s) IV Intermittent every 24 hours  lactated ringers. 1000 milliLiter(s) (80 mL/Hr) IV Continuous <Continuous>  meropenem  IVPB 1000 milliGRAM(s) IV Intermittent every 8 hours  meropenem  IVPB      norepinephrine Infusion 0.05 MICROgram(s)/kG/Min (4.678 mL/Hr) IV Continuous <Continuous>  pantoprazole  Injectable 40 milliGRAM(s) IV Push daily  propofol Infusion 50 MICROgram(s)/kG/Min (29.94 mL/Hr) IV Continuous <Continuous>    MEDICATIONS  (PRN):      07-19-18 @ 07:01  -  07-20-18 @ 07:00  --------------------------------------------------------  IN: 5160.6 mL / OUT: 850 mL / NET: 4310.6 mL    07-20-18 @ 07:01  -  07-20-18 @ 15:38  --------------------------------------------------------  IN: 1433 mL / OUT: 565 mL / NET: 868 mL      PHYSICAL EXAM:      T(C): 37.1 (07-20-18 @ 12:30), Max: 37.2 (07-20-18 @ 07:54)  HR: 80 (07-20-18 @ 14:30) (68 - 91)  BP: 100/69 (07-20-18 @ 14:30) (86/66 - 116/84)  RR: 16 (07-20-18 @ 14:30) (10 - 24)  SpO2: 100% (07-20-18 @ 14:30) (100% - 100%)  Wt(kg): --  Respiratory: clear anteriorly, decreased BS at bases  Cardiovascular: S1 S2  Gastrointestinal: soft NT slightly distended +BS  Extremities:   1 edema                                    9.9    14.19 )-----------( 157      ( 20 Jul 2018 02:15 )             29.4     07-20    138  |  101  |  46<H>  ----------------------------<  162<H>  3.6   |  25  |  1.30    Ca    8.1<L>      20 Jul 2018 02:15  Phos  2.0     07-20  Mg     2.0     07-20    TPro  6.4  /  Alb  2.2<L>  /  TBili  7.5<H>  /  DBili  x   /  AST  103<H>  /  ALT  105<H>  /  AlkPhos  115  07-20    ABG - ( 20 Jul 2018 11:40 )  pH, Arterial: x     pH, Blood: 7.64  /  pCO2: 26    /  pO2: 183   / HCO3: 28    / Base Excess: 7.0   /  SaO2: 100               LIVER FUNCTIONS - ( 20 Jul 2018 02:15 )  Alb: 2.2 g/dL / Pro: 6.4 gm/dL / ALK PHOS: 115 U/L / ALT: 105 U/L / AST: 103 U/L / GGT: x             Assessment and Plan:  SHARYN sepsis, ATN; emphysematous pyelonephritis;  Nonoliguric, stabilizing;   Judicious IVF; CXR with PVC;   IV abx; pressors for MAP < 65;   Will follow.

## 2018-07-20 NOTE — DIETITIAN INITIAL EVALUATION ADULT. - PERTINENT MEDS FT
Zithromax, Diflucan, Meropenem, Peridex, Hibiclens, Protonix, Hibiclens, Levophed, Sodium Bicarbonate infusion

## 2018-07-20 NOTE — DIETITIAN INITIAL EVALUATION ADULT. - PERTINENT LABORATORY DATA
07-20 Na 138 mmol/L Glu 162 mg/dL<H> K+ 3.6 mmol/L Cr  1.30 mg/dL BUN 46 mg/dL<H> Phos 2.0 mg/dL<L> Alb 2.2 g/dL<L> PAB n/a   Hgb 9.9 g/dL<L> Hct 29.4 %<L>, WBC=14.19, CRP=3.17, Gtvdxkejwc=867, Bilirubin=7.48(7/19)

## 2018-07-20 NOTE — PROGRESS NOTE ADULT - PROBLEM SELECTOR PLAN 1
shock state resolving on Vanco/merropenem/diflucan  off pressors  D/C Zithromax urine legionella negative  urology consult appreciated

## 2018-07-20 NOTE — PROGRESS NOTE ADULT - ASSESSMENT
HPI:  See H and P for full details.  See in  ED noted to be in respiratory distress on Bipap with metabolic acidosis.  Given 2 amps bicarb and bicarb drip for metabolic acidosis, lactate 12. Spoke to her at length, she does not doctor has not seen and MD in many years. She was AAand 0 x 4 knows who the president is in spite of her hepatic encephalopathy, Denies ETOH/drugs/Hx of HIV or liver disease. Noted to be in fulminant hepatic failure, t bili 10.7, INR 2.76, AST 84  . Denies Tylenol consumption or any toxic ingestion. Post cardioversion for Afib with RVR earlier today, reportedly lost a pulse. In ICU, made decision to intubate . premedicated with 4 mg mversed, 100 mcg fent, started on propofol at 40. Intubated by myself on first attempt with glidescope 7.5 ETT to lip 21, placed RIJ TLC on first attempt. Bedside echo with RV dilation, severe TR, septal bowing, LV EF appears normal to slighlty decreased, I got LVOT VTI of 10 possibly indicating a component of LV failure that to me seems secondary to a primary RV proscess, possibly portopulmonary HTN. Basically my question is: is this a chronic liver picture causing portopulmonary HTN and RV failure or is this a primary RV failure causing hepatic congestion. Will check formal echo, fractionate the bilis, send HIV, hepatitis and autoimmune work up. Will treat the fulminant hepatic failure with N-acetylcycteine protocol which has good evidence behind it for all forms of fulminant hepatic failure, f/u a tylenol level, add lactulose for the hyperammonemia,Aztrenaom flagyl for intrabdominal proscess in setting of PCN allergey with unknown reaction, check urine electrolytes to R/O hepatorenal syndrome, levophed to keep MAP > 65 mmhg, Check formal echo, trend cardiac enzymes. GI, renal, cards consults. C/W bicarb drip for now is making some urine. Non contrast CT H/C/A/P to evaluate for sources of sepsis.  Prognosis is guarded. She told me she has a daughter named Osvaldo in [place] who she wishes to be her HCP, thou sounds like she may be sestranged from this daughter.  CCM time initial 46 min plus another 1 hour, total 1 hr and 46 minuted included recieving patient from ER, preparation for intubation  intubation, central line placement bedside echo.  ----------------------- as Above --------------------------------------------------------------------------------------------------  Np history obtainable besides above. Intubated unresponsive. Notes reviewed. See Ct scan / sonogram. Bilirubin shows improvement over a short period of time  ---------------- Elevated Bili > transaminases/alk phos  - Seconadry to acute on chronic liver disease , VS side effects of medications. 1) supportive care  2) f/u LFTs 3) work up for underlying liver disease

## 2018-07-20 NOTE — PROGRESS NOTE ADULT - SUBJECTIVE AND OBJECTIVE BOX
Patient seen and examined bedside resting comfortably in CCU, intubated and sedated.    T(F): 98.1 (07-19-18 @ 23:49), Max: 98.1 (07-19-18 @ 23:49)  HR: 90 (07-20-18 @ 02:30) (68 - 90)  BP: 102/80 (07-20-18 @ 02:30) (84/71 - 126/92)  RR: 18 (07-20-18 @ 02:30) (10 - 24)  SpO2: 100% (07-20-18 @ 02:30) (87% - 100%)      PHYSICAL EXAM:    General: NAD, intubated and sedated  Chest: Clear breaths sounds b/l  Abdomen: soft, NT/ND.   Extremities: Calf soft, nontender b/l. 2+ pitting edema.   : No suprapubic tenderness or bladder distention.  Lyons draining clear yellow urine    LABS:                        9.9    14.19 )-----------( 157      ( 20 Jul 2018 02:15 )             29.4   07-20    138  |  101  |  46<H>  ----------------------------<  162<H>  3.6   |  25  |  1.30    Ca    8.1<L>      20 Jul 2018 02:15  Phos  2.0     07-20  Mg     2.0     07-20    TPro  6.4  /  Alb  2.2<L>  /  TBili  7.5<H>  /  DBili  x   /  AST  103<H>  /  ALT  105<H>  /  AlkPhos  115  07-20  PT/INR - ( 20 Jul 2018 02:15 )   PT: 30.8 sec;   INR: 2.77 ratio         PTT - ( 19 Jul 2018 06:08 )  PTT:28.0 sec  I&O's Detail    19 Jul 2018 07:01  -  20 Jul 2018 04:53  --------------------------------------------------------  IN:    Cryoprecipitate: 115 mL    FFP (Fresh Frozen Plasma): 203 mL    norepinephrine Infusion: 46 mL    norepinephrine Infusion: 55.1 mL    propofol Infusion: 138.3 mL    sodium bicarbonate  Infusion: 2100 mL    Solution: 1274.8 mL    Solution: 100 mL    Solution: 100 mL    Solution: 100 mL    Solution: 100 mL  Total IN: 4332.2 mL    OUT:    Indwelling Catheter - Urethral: 850 mL  Total OUT: 850 mL    Total NET: 3482.2 mL        Assessment: 55yo Female admitted with septic shock, respiratory failure, found with emphysematous pyelonephritis with stag horn calculous of Left kidney on CT scan.    Plan:  Continue primary medical management per ICU team  continue Antibiotics  Lyons, monitor urine output, strict I/Os  f/u labs, trend renal function, CR improving  Discuss with Dr. Frances
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
## 2018-07-20 NOTE — DIETITIAN INITIAL EVALUATION ADULT. - OTHER INFO
Pt seen for CCU admission & pressure ulcer > stage II. Unable to obtain wt & diet hx due to intubation & pt poor historian. Pt seen for CCU admission & pressure ulcer > stage II. Unable to obtain wt & diet hx due to intubation & pt poor historian. Skin integrity sacrum stage II

## 2018-07-20 NOTE — PROGRESS NOTE ADULT - SUBJECTIVE AND OBJECTIVE BOX
TMAX - 98.9 - was decreased to 95.4 overnight    On day # 2 Meropenem / # 2 Vanco per level / # 2 Diflucan    Vital Signs Last 24 Hrs  T(C): 37.1 (2018 12:30), Max: 37.2 (2018 07:54)  T(F): 98.8 (2018 12:30), Max: 98.9 (2018 07:54)  HR: 80 (2018 14:30) (68 - 91)  BP: 100/69 (2018 14:30) (86/66 - 116/84)  BP(mean): 79 (2018 14:30) (73 - 96)  RR: 16 (2018 14:30) (10 - 24)  SpO2: 100% (2018 14:30) (100% - 100%)  Mode: AC/ CMV (Assist Control/ Continuous Mandatory Ventilation)  RR (machine): 12  TV (machine): 450  FiO2: 45  PEEP: 5  ITime: 0.9  MAP: 12  PIP: 20  Supplemental O2:  on 45 % FIO2 on ventilator now with 5 PEEP      Remains sedated on ventilator, but now off pressors and hyperthermia blanket is off as well.  Above notes appreciated - seen by Urology, GI, and Renal as well.        PHYSICAL EXAM  General: sedated on ventilator, on 45% FIO2 + 5 PEEP, lying in bed now, appears comfortable at present  HEENT: conj pink, sclerae muddy, PERRLA, orally intubated and with NGT in place and clamped at present  Neck:  semi-supple, no nodes noted            RIJ CVP in place - site clean with dressing intact - placed 18  Heart:  RR  Lungs:  decreased BS at bases bilaterally  Abdomen:  semi-soft, BS +, appears nontender to palpation, marked edema of the abdominal wall with some warmth and erythema still of the lower abdominal wall and suprapubic region  Extremities:  2 + edema LE's with chronic skin changes bilaterally of the LE's                     Arms and hands remain swollen as well                      peripheral IV's in both hands  Skin:   warm, dry, no rash noted  Lyons in place and draining purulent-appearing deep sadia-colored urine        I&O's Summary :    2018 07:01  -  2018 07:00  --------------------------------------------------------  IN: 5160.6 mL / OUT: 850 mL / NET: 4310.6 mL    2018 07:01  -  2018 15:46  --------------------------------------------------------  IN: 1433 mL / OUT: 565 mL / NET: 868 mL        LABS:  CBC Full  -  ( 2018 02:15 )  WBC Count : 14.19 K/uL  Hemoglobin : 9.9 g/dL  Hematocrit : 29.4 %  Platelet Count - Automated : 157 K/uL  Mean Cell Volume : 84.5 fl  Mean Cell Hemoglobin : 28.4 pg  Mean Cell Hemoglobin Concentration : 33.7 gm/dL  Auto Neutrophil # : x  Auto Lymphocyte # : x  Auto Monocyte # : x  Auto Eosinophil # : x  Auto Basophil # : x  Auto Neutrophil % : x  Auto Lymphocyte % : x  Auto Monocyte % : x  Auto Eosinophil % : x  Auto Basophil % : x    07-20    138  |  101  |  46<H>  ----------------------------<  162<H>  3.6   |  25  |  1.30    Ca    8.1<L>      2018 02:15  Phos  2.0     07-20  Mg     2.0     07-20    TPro  6.4  /  Alb  2.2<L>  /  TBili  7.5<H>  /  DBili  x   /  AST  103<H>  /  ALT  105<H>  /  AlkPhos  115  07-20    LIVER FUNCTIONS - ( 2018 02:15 )  Alb: 2.2 g/dL / Pro: 6.4 gm/dL / ALK PHOS: 115 U/L / ALT: 105 U/L / AST: 103 U/L / GGT: x             PT/INR - ( 2018 02:15 )   PT: 30.8 sec;   INR: 2.77 ratio       PTT - ( 2018 06:08 )  PTT:28.0 sec        Urinalysis Basic - ( 2018 09:47 )  Color: Sadia / Appearance: very cloudy / S.020 / pH: x  Gluc: x / Ketone: Trace  / Bili: Large / Urobili: 8 mg/dL   Blood: x / Protein: 100 mg/dL / Nitrite: Positive   Leuk Esterase: Moderate / RBC: 11-25 /HPF / WBC 26-50   Sq Epi: x / Non Sq Epi: Moderate / Bacteria: Many      ABG - ( 2018 11:40 )  pH, Arterial: x     pH, Blood: 7.64  /  pCO2: 26    /  pO2: 183   / HCO3: 28    / Base Excess: 7.0   /  SaO2: 100           CARDIAC MARKERS ( 2018 21:36 )  .092 ng/mL / x     / 83 U/L / x     / x        CARDIAC MARKERS ( 2018 13:53 )  .077 ng/mL / x     / 93 U/L / x     / x        CARDIAC MARKERS ( 2018 06:08 )  .034 ng/mL / x     / 66 U/L / x     / 2.8 ng/mL      HCG Quantitative, Serum: <1 mIU/mL ( @ 13:53)    Procalcitonin - 0.78 ( 18 )    CRP - 3.17    Sedimentation Rate, Erythrocyte: 7 mm/hr ( @ 02:15)    Rapid HIV-1/2 Antibody: Nonreact ( @ 13:59)        Vancomycin Level, Trough: 7.5 ug/mL ( @ 02:15)      Amylase, Serum Total: 60 U/L ( @ 02:15)    Lipase, Serum: 270 U/L ( @ 02:15)    Amylase, Serum Total: 69 U/L ( @ 12:38)    Lipase, Serum: 169 U/L ( @ 12:38)        MICROBIOLOGY:  Specimen Source: .Urine Clean Catch (Midstream) ( @ 12:46)  Culture Results:   Culture grew 3 or more types of organisms which indicate  collection contamination; consider recollection only if clinically  indicated. ( @ 12:46)    Specimen Source: .Blood Blood-Peripheral ( @ 08:31)  Culture Results:   No growth to date. ( @ 08:31)    Specimen Source: .Blood Blood-Peripheral ( @ 08:31)  Culture Results:   Growth in aerobic bottle:  Gram Positive Cocci in Clusters  Identified as  CNS by PCR          Legionella Antigen, Urine: Negative ( @ 01:26)        Radiology:  CXR - < from: Xray Chest 1 View AP/PA. (18 @ 07:47) >  FINDINGS:   ET tube tip is 2.0cm above the nivia.  NG tube is in the stomach.  Right IJ venous catheter tip at cavoatrial junction.  Mild central congestive changes have worsened c/w 18 study.  No right lung focal infiltrate seen. Increasing left retrocardiac   airspace haziness is seen  - may be due to atelectasis but underlying   pneumonia not excluded in the right clinical setting.  No pneumothorax.  No acute bony abnormalities.     IMPRESSION:   1. Mild worsening of central congestive changes.  2. Increasing left retrocardiac haziness due to atelectasis and/or   pneumonia.  3. Life supporting devices in appropriate position.          < from: US Abdomen Complete (18 @ 16:58) >  COMPARISON: CT dated 2018    TECHNIQUE: Sonography of the abdomen.     FINDINGS:    Liver: Enlarged. Increased echogenicity.    Bile ducts: Normal caliber. Common bile duct measures 3 mm.     Gallbladder: No cholelithiasis. Wall thickening (1.1 cm).        Pancreas: Visualized portions are within normal limits.    Spleen: 9 cm. Within normal limits.    Right kidney: 10.5 cm. No hydronephrosis.        Left kidney: 10.7 cm.  No hydronephrosis. Shadowing echogenic foci   measuring 1.4 cm and 0.5 cm.    Ascites: Mild.    Aorta and IVC: Visualized portions are within normal limits.    IMPRESSION:     Hepatomegaly.  Nonspecific gallbladder wall thickening.   No cholelithiasis or biliary ductal dilatation.  Left nephrolithiasis.                       < from: US Duplex Venous Lower Ext Complete, Bilateral (18 @ 16:51) >  FINDINGS:    There is normal compressibility of the bilateral common femoral, femoral   and popliteal veins. No calf vein thrombosis isdetected.    Doppler examination shows normal spontaneous and phasic flow.    IMPRESSION:     No evidence of bilateral lower extremity deep venous thrombosis.        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  Noted 1 Blood Cx + CNS as identified by PCR in the aerobic bottle - ? contaminant.  But noted Urine Cx re[ported with > 3 organisms - this is not a contaminant - I spoke with the Micro lab and requested further w/u with identification and sensitivities of the all the isolates.  Noted Urine Legionella Ag is negative and TBili is trending downwards now.      Suggestions:  Will therefore continue current ab rx and begin standing Vanco  dosing with 750mg q 12 hrs and continue to follow-up temps and labs closely.  Await further Cx results.  Follow-up CXR.
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
## 2018-07-21 DIAGNOSIS — E87.3 ALKALOSIS: ICD-10-CM

## 2018-07-21 LAB
ALBUMIN SERPL ELPH-MCNC: 1.8 G/DL — LOW (ref 3.3–5)
ALP SERPL-CCNC: 92 U/L — SIGNIFICANT CHANGE UP (ref 40–120)
ALT FLD-CCNC: 73 U/L — SIGNIFICANT CHANGE UP (ref 12–78)
ANION GAP SERPL CALC-SCNC: 10 MMOL/L — SIGNIFICANT CHANGE UP (ref 5–17)
ANION GAP SERPL CALC-SCNC: 7 MMOL/L — SIGNIFICANT CHANGE UP (ref 5–17)
AST SERPL-CCNC: 61 U/L — HIGH (ref 15–37)
BASE EXCESS BLDA CALC-SCNC: 5.5 MMOL/L — HIGH (ref -2–2)
BASE EXCESS BLDA CALC-SCNC: 7.7 MMOL/L — HIGH (ref -2–2)
BASOPHILS # BLD AUTO: 0.02 K/UL — SIGNIFICANT CHANGE UP (ref 0–0.2)
BASOPHILS NFR BLD AUTO: 0.2 % — SIGNIFICANT CHANGE UP (ref 0–2)
BILIRUB DIRECT SERPL-MCNC: 4.18 MG/DL — HIGH (ref 0.05–0.2)
BILIRUB INDIRECT FLD-MCNC: 0.9 MG/DL — SIGNIFICANT CHANGE UP (ref 0.2–1)
BILIRUB SERPL-MCNC: 5.1 MG/DL — HIGH (ref 0.2–1.2)
BLOOD GAS COMMENTS: SIGNIFICANT CHANGE UP
BLOOD GAS SOURCE: SIGNIFICANT CHANGE UP
BLOOD GAS SOURCE: SIGNIFICANT CHANGE UP
BUN SERPL-MCNC: 26 MG/DL — HIGH (ref 7–23)
BUN SERPL-MCNC: 28 MG/DL — HIGH (ref 7–23)
CALCIUM SERPL-MCNC: 7.6 MG/DL — LOW (ref 8.5–10.1)
CALCIUM SERPL-MCNC: 7.6 MG/DL — LOW (ref 8.5–10.1)
CHLORIDE SERPL-SCNC: 102 MMOL/L — SIGNIFICANT CHANGE UP (ref 96–108)
CHLORIDE SERPL-SCNC: 104 MMOL/L — SIGNIFICANT CHANGE UP (ref 96–108)
CO2 SERPL-SCNC: 29 MMOL/L — SIGNIFICANT CHANGE UP (ref 22–31)
CO2 SERPL-SCNC: 29 MMOL/L — SIGNIFICANT CHANGE UP (ref 22–31)
CREAT SERPL-MCNC: 0.65 MG/DL — SIGNIFICANT CHANGE UP (ref 0.5–1.3)
CREAT SERPL-MCNC: 0.75 MG/DL — SIGNIFICANT CHANGE UP (ref 0.5–1.3)
CULTURE RESULTS: SIGNIFICANT CHANGE UP
CULTURE RESULTS: SIGNIFICANT CHANGE UP
DSDNA AB SER QL CLIF: NEGATIVE — SIGNIFICANT CHANGE UP
EOSINOPHIL # BLD AUTO: 0.06 K/UL — SIGNIFICANT CHANGE UP (ref 0–0.5)
EOSINOPHIL NFR BLD AUTO: 0.6 % — SIGNIFICANT CHANGE UP (ref 0–6)
GLUCOSE SERPL-MCNC: 94 MG/DL — SIGNIFICANT CHANGE UP (ref 70–99)
GLUCOSE SERPL-MCNC: 96 MG/DL — SIGNIFICANT CHANGE UP (ref 70–99)
GRAM STN FLD: SIGNIFICANT CHANGE UP
HCO3 BLDA-SCNC: 27 MMOL/L — SIGNIFICANT CHANGE UP (ref 21–29)
HCO3 BLDA-SCNC: 30 MMOL/L — HIGH (ref 21–29)
HCT VFR BLD CALC: 26.6 % — LOW (ref 34.5–45)
HGB BLD-MCNC: 8.8 G/DL — LOW (ref 11.5–15.5)
HOROWITZ INDEX BLDA+IHG-RTO: 40 — SIGNIFICANT CHANGE UP
HOROWITZ INDEX BLDA+IHG-RTO: 45 — SIGNIFICANT CHANGE UP
IMM GRANULOCYTES NFR BLD AUTO: 0.5 % — SIGNIFICANT CHANGE UP (ref 0–1.5)
INR BLD: 1.79 RATIO — HIGH (ref 0.88–1.16)
LACTATE SERPL-SCNC: 1 MMOL/L — SIGNIFICANT CHANGE UP (ref 0.7–2)
LYMPHOCYTES # BLD AUTO: 2.48 K/UL — SIGNIFICANT CHANGE UP (ref 1–3.3)
LYMPHOCYTES # BLD AUTO: 24.9 % — SIGNIFICANT CHANGE UP (ref 13–44)
MAGNESIUM SERPL-MCNC: 1.8 MG/DL — SIGNIFICANT CHANGE UP (ref 1.6–2.6)
MCHC RBC-ENTMCNC: 27.9 PG — SIGNIFICANT CHANGE UP (ref 27–34)
MCHC RBC-ENTMCNC: 33.1 GM/DL — SIGNIFICANT CHANGE UP (ref 32–36)
MCV RBC AUTO: 84.4 FL — SIGNIFICANT CHANGE UP (ref 80–100)
MONOCYTES # BLD AUTO: 0.82 K/UL — SIGNIFICANT CHANGE UP (ref 0–0.9)
MONOCYTES NFR BLD AUTO: 8.2 % — SIGNIFICANT CHANGE UP (ref 2–14)
NEUTROPHILS # BLD AUTO: 6.53 K/UL — SIGNIFICANT CHANGE UP (ref 1.8–7.4)
NEUTROPHILS NFR BLD AUTO: 65.6 % — SIGNIFICANT CHANGE UP (ref 43–77)
NRBC # BLD: 0 /100 WBCS — SIGNIFICANT CHANGE UP (ref 0–0)
ORGANISM # SPEC MICROSCOPIC CNT: SIGNIFICANT CHANGE UP
ORGANISM # SPEC MICROSCOPIC CNT: SIGNIFICANT CHANGE UP
PCO2 BLDA: 30 MMHG — LOW (ref 32–46)
PCO2 BLDA: 30 MMHG — LOW (ref 32–46)
PH BLD: 7.57 — HIGH (ref 7.35–7.45)
PH BLD: 7.6 — CRITICAL HIGH (ref 7.35–7.45)
PHOSPHATE SERPL-MCNC: 1.2 MG/DL — LOW (ref 2.5–4.5)
PLATELET # BLD AUTO: 116 K/UL — LOW (ref 150–400)
PO2 BLDA: 152 MMHG — HIGH (ref 74–108)
PO2 BLDA: 173 MMHG — HIGH (ref 74–108)
POTASSIUM SERPL-MCNC: 2.5 MMOL/L — CRITICAL LOW (ref 3.5–5.3)
POTASSIUM SERPL-MCNC: 2.9 MMOL/L — CRITICAL LOW (ref 3.5–5.3)
POTASSIUM SERPL-SCNC: 2.5 MMOL/L — CRITICAL LOW (ref 3.5–5.3)
POTASSIUM SERPL-SCNC: 2.9 MMOL/L — CRITICAL LOW (ref 3.5–5.3)
PROT SERPL-MCNC: 5.5 GM/DL — LOW (ref 6–8.3)
PROTHROM AB SERPL-ACNC: 19.7 SEC — HIGH (ref 9.8–12.7)
RBC # BLD: 3.15 M/UL — LOW (ref 3.8–5.2)
RBC # FLD: 25.1 % — HIGH (ref 10.3–14.5)
SAO2 % BLDA: 100 % — HIGH (ref 92–96)
SAO2 % BLDA: 100 % — HIGH (ref 92–96)
SODIUM SERPL-SCNC: 140 MMOL/L — SIGNIFICANT CHANGE UP (ref 135–145)
SODIUM SERPL-SCNC: 141 MMOL/L — SIGNIFICANT CHANGE UP (ref 135–145)
SPECIMEN SOURCE: SIGNIFICANT CHANGE UP
WBC # BLD: 9.96 K/UL — SIGNIFICANT CHANGE UP (ref 3.8–10.5)
WBC # FLD AUTO: 9.96 K/UL — SIGNIFICANT CHANGE UP (ref 3.8–10.5)

## 2018-07-21 PROCEDURE — 99233 SBSQ HOSP IP/OBS HIGH 50: CPT

## 2018-07-21 PROCEDURE — 71045 X-RAY EXAM CHEST 1 VIEW: CPT | Mod: 26

## 2018-07-21 RX ORDER — METOPROLOL TARTRATE 50 MG
5 TABLET ORAL ONCE
Qty: 0 | Refills: 0 | Status: COMPLETED | OUTPATIENT
Start: 2018-07-21 | End: 2018-07-21

## 2018-07-21 RX ORDER — ACETAZOLAMIDE 250 MG/1
250 TABLET ORAL EVERY 12 HOURS
Qty: 0 | Refills: 0 | Status: COMPLETED | OUTPATIENT
Start: 2018-07-21 | End: 2018-07-22

## 2018-07-21 RX ORDER — POTASSIUM PHOSPHATE, MONOBASIC POTASSIUM PHOSPHATE, DIBASIC 236; 224 MG/ML; MG/ML
15 INJECTION, SOLUTION INTRAVENOUS ONCE
Qty: 0 | Refills: 0 | Status: COMPLETED | OUTPATIENT
Start: 2018-07-21 | End: 2018-07-21

## 2018-07-21 RX ORDER — MIDODRINE HYDROCHLORIDE 2.5 MG/1
10 TABLET ORAL THREE TIMES A DAY
Qty: 0 | Refills: 0 | Status: DISCONTINUED | OUTPATIENT
Start: 2018-07-21 | End: 2018-07-24

## 2018-07-21 RX ORDER — PANTOPRAZOLE SODIUM 20 MG/1
40 TABLET, DELAYED RELEASE ORAL DAILY
Qty: 0 | Refills: 0 | Status: DISCONTINUED | OUTPATIENT
Start: 2018-07-21 | End: 2018-07-25

## 2018-07-21 RX ORDER — POTASSIUM CHLORIDE 20 MEQ
40 PACKET (EA) ORAL ONCE
Qty: 0 | Refills: 0 | Status: COMPLETED | OUTPATIENT
Start: 2018-07-21 | End: 2018-07-21

## 2018-07-21 RX ORDER — MAGNESIUM SULFATE 500 MG/ML
1 VIAL (ML) INJECTION ONCE
Qty: 0 | Refills: 0 | Status: COMPLETED | OUTPATIENT
Start: 2018-07-21 | End: 2018-07-21

## 2018-07-21 RX ORDER — POTASSIUM CHLORIDE 20 MEQ
40 PACKET (EA) ORAL EVERY 4 HOURS
Qty: 0 | Refills: 0 | Status: COMPLETED | OUTPATIENT
Start: 2018-07-21 | End: 2018-07-22

## 2018-07-21 RX ADMIN — MIDODRINE HYDROCHLORIDE 10 MILLIGRAM(S): 2.5 TABLET ORAL at 22:56

## 2018-07-21 RX ADMIN — PROPOFOL 29.94 MICROGRAM(S)/KG/MIN: 10 INJECTION, EMULSION INTRAVENOUS at 18:54

## 2018-07-21 RX ADMIN — Medication 250 MILLIGRAM(S): at 18:51

## 2018-07-21 RX ADMIN — CHLORHEXIDINE GLUCONATE 15 MILLILITER(S): 213 SOLUTION TOPICAL at 06:08

## 2018-07-21 RX ADMIN — Medication 250 MILLIGRAM(S): at 06:09

## 2018-07-21 RX ADMIN — Medication 4.68 MICROGRAM(S)/KG/MIN: at 09:40

## 2018-07-21 RX ADMIN — MEROPENEM 100 MILLIGRAM(S): 1 INJECTION INTRAVENOUS at 06:08

## 2018-07-21 RX ADMIN — PANTOPRAZOLE SODIUM 40 MILLIGRAM(S): 20 TABLET, DELAYED RELEASE ORAL at 13:14

## 2018-07-21 RX ADMIN — SODIUM CHLORIDE 80 MILLILITER(S): 9 INJECTION, SOLUTION INTRAVENOUS at 14:04

## 2018-07-21 RX ADMIN — PROPOFOL 29.94 MICROGRAM(S)/KG/MIN: 10 INJECTION, EMULSION INTRAVENOUS at 13:55

## 2018-07-21 RX ADMIN — MIDODRINE HYDROCHLORIDE 10 MILLIGRAM(S): 2.5 TABLET ORAL at 15:36

## 2018-07-21 RX ADMIN — PROPOFOL 29.94 MICROGRAM(S)/KG/MIN: 10 INJECTION, EMULSION INTRAVENOUS at 22:57

## 2018-07-21 RX ADMIN — CHLORHEXIDINE GLUCONATE 1 APPLICATION(S): 213 SOLUTION TOPICAL at 05:10

## 2018-07-21 RX ADMIN — Medication 40 MILLIEQUIVALENT(S): at 08:00

## 2018-07-21 RX ADMIN — Medication 40 MILLIEQUIVALENT(S): at 22:56

## 2018-07-21 RX ADMIN — SODIUM CHLORIDE 80 MILLILITER(S): 9 INJECTION, SOLUTION INTRAVENOUS at 22:57

## 2018-07-21 RX ADMIN — MEROPENEM 100 MILLIGRAM(S): 1 INJECTION INTRAVENOUS at 15:35

## 2018-07-21 RX ADMIN — POTASSIUM PHOSPHATE, MONOBASIC POTASSIUM PHOSPHATE, DIBASIC 63.75 MILLIMOLE(S): 236; 224 INJECTION, SOLUTION INTRAVENOUS at 08:01

## 2018-07-21 RX ADMIN — PROPOFOL 29.94 MICROGRAM(S)/KG/MIN: 10 INJECTION, EMULSION INTRAVENOUS at 01:24

## 2018-07-21 RX ADMIN — Medication 100 GRAM(S): at 08:00

## 2018-07-21 RX ADMIN — SODIUM CHLORIDE 80 MILLILITER(S): 9 INJECTION, SOLUTION INTRAVENOUS at 01:01

## 2018-07-21 RX ADMIN — Medication 4.68 MICROGRAM(S)/KG/MIN: at 22:56

## 2018-07-21 RX ADMIN — MEROPENEM 100 MILLIGRAM(S): 1 INJECTION INTRAVENOUS at 22:56

## 2018-07-21 RX ADMIN — FLUCONAZOLE 100 MILLIGRAM(S): 150 TABLET ORAL at 19:39

## 2018-07-21 RX ADMIN — ACETAZOLAMIDE 250 MILLIGRAM(S): 250 TABLET ORAL at 13:50
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
## 2018-07-21 NOTE — PROGRESS NOTE ADULT - SUBJECTIVE AND OBJECTIVE BOX
HPI:  55yo lady who presented to the ER in respiratory distress / metabolic acidosis / hepatic encephalopathy / hepatic failure.  Intubated after a ?PEA arrest s/p DCCV for Afib with RVR.  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 1000 milliLiter(s) (80 mL/Hr) IV Continuous <Continuous>  meropenem  IVPB 1000 milliGRAM(s) IV Intermittent every 8 hours   norepinephrine Infusion 0.05 MICROgram(s)/kG/Min (4.678 mL/Hr) IV Continuous <Continuous>  pantoprazole  Injectable 40 milliGRAM(s) IV Push daily  propofol Infusion 50 MICROgram(s)/kG/Min (29.94 mL/Hr) IV Continuous <Continuous>  vancomycin  IVPB 750 milliGRAM(s) IV Intermittent every 12 hours    MEDICATIONS  (PRN):      PHYSICAL EXAMINATION:  -----------------------------  Vital Signs Last 24 Hrs  T(C): 36.7 (21 Jul 2018 09:20), Max: 36.8 (20 Jul 2018 16:00)  T(F): 98.1 (21 Jul 2018 09:20), Max: 98.3 (20 Jul 2018 16:00)  HR: 86 (21 Jul 2018 12:00) (70 - 96)  BP: 99/78 (21 Jul 2018 11:30) (76/65 - 107/87)  BP(mean): 86 (21 Jul 2018 11:30) (68 - 95)  RR: 21 (21 Jul 2018 11:30) (6 - 21)  SpO2: 100% (21 Jul 2018 12:00) (93% - 100%)    Constitutional: intubated  Eyes: the conjunctiva exhibited no abnormalities and the eyelids demonstrated no xanthelasmas.   HEENT: normal oral mucosa, no oral pallor and no oral cyanosis.   Neck: normal jugular venous A waves present, normal jugular venous V waves present and no jugular venous mcknight A waves.   Pulmonary: no respiratory distress on vent support, normal respiratory rhythm and effort, no accessory muscle use and lungs were clear  Cardiovascular: heart rate and rhythm were normal, normal S1 and S2 and no murmur  Abdomen: soft, non-tender, no hepato-splenomegaly  Musculoskeletal: the gait could not be assessed.  Extremities: no clubbing of the fingernails, no localized cyanosis  Skin: normal skin color and pigmentation, no rash, no venous stasis    LABS:   --------                                   8.8    9.96  )-----------( 116      ( 21 Jul 2018 04:14 )             26.6   07-21    141  |  102  |  28<H>  ----------------------------<  94  2.5<LL>   |  29  |  0.75    Ca    7.6<L>      21 Jul 2018 04:14  Phos  1.2     07-21  Mg     1.8     07-21    TPro  5.5<L>  /  Alb  1.8<L>  /  TBili  5.1<H>  /  DBili  4.18<H>  /  AST  61<H>  /  ALT  73  /  AlkPhos  92  07-21        < from: CT Abdomen and Pelvis No Cont (07.19.18 @ 15:59) >  PROCEDURE DATE:  07/19/2018          CARDIAC MARKERS ( 19 Jul 2018 21:36 )  .092 ng/mL / x     / 83 U/L / x     / x      CARDIAC MARKERS ( 19 Jul 2018 13:53 )  .077 ng/mL / x     / 93 U/L / x     / x        INTERPRETATION:  CT of the chest, abdomen, and pelvis without contrast    Indication: Shock. Respiratory failure. Jaundice. Elevated bilirubin.    Technique: Axial multidetector CT images of the chest, abdomen, and   pelvis are acquired without IV or oral contrast.    Comparison: None.    Findings:    Chest: Mild bilateral pleural effusions. Small right pericardial   effusion. Cardiomegaly. No aortic aneurysm. Allowing for the noncontrast   technique, there is no grossly enlarged mediastinal, hilar, or axillary   lymph node.    ET tube terminates above the nivia. NG tube terminates in the stomach.    Small densities in the right mainstem bronchus may represent mucous   material.    Mild emphysematous changes in the right lung, suggestive of COPD. Small   bilateral atelectasis. Mild hazy groundglass densities in both lungs may   represent pulmonary edema or pneumonia. No evidence for pneumothorax.    Abdomen: Evaluation of the abdomen and pelvis is limited by lack of IV   and oral contrast. Allowing for noncontrast technique, the liver,   pancreas, spleen, and the right kidney appear grossly unremarkable. There   is a staghorn calculus in the left kidney. Apparent air in the left renal   calyces. No hydronephrosis.    Nonspecific adrenal thickening bilaterally.    The gallbladder appears contracted. Pericholecystic fluid versus   gallbladder wall edema. The common bile duct is not visualized on this   limited examination. If clinically indicated, abdominal ultrasound may be   pursued for further evaluation.    No bowel obstruction, or grossly thickened bowel wall. The appendix is   not identified. No evidence free air, or enlarged lymph node. Mild to   moderate ascites in the abdomen and pelvis.    Pelvis: There is a Lyons catheter in the urinary bladder which contains   air. The urinary bladder is underdistended, rendering evaluation   difficult. The bowel structures appear grossly unremarkable. No grossly   enlarged pelvic lymph node.    There is diffuse body wall edema in the chest, abdomen, and pelvis.    Impression: Anasarca.    Mild bilateral pleural effusions. Small right pericardial effusion.    Small densities in the right mainstem bronchus may represent mucous   material. Follow-up chest CT may be pursued in 4-6 weeks to ensure   clearance.    Mild hazy groundglass densities in both lungs may represent pulmonary   edema or pneumonia. Clinical correlation is recommended.    The gallbladder appears contracted. Pericholecystic fluid versus   gallbladder wall edema. The common bile duct is not visualized on this   limited examination. If clinically indicated, abdominal ultrasound may be   pursued for further evaluation. Alternatively, abdominal MR without and   with IV contrast including MRCP may be pursued.    No bowel obstruction, or grossly thickened bowel wall. The appendix is   not identified.    Mild to moderate ascites in the abdomen and pelvis.    Lyons catheter in the urinary bladder. Associated air in the urinary   bladder. Staghorn calculus in the left kidney. Apparent air in the left   renal calyces may be due to reflux from the urinary bladder or may   represent emphysematous pyelitis. Clinical correlation is recommended.    < end of copied text >    < from: US Duplex Venous Lower Ext Complete, Bilateral (07.19.18 @ 16:51) >  IMPRESSION:     No evidence of bilateral lower extremity deep venous thrombosis.    < end of copied text >        < from: TTE Echo Doppler w/o Cont (07.19.18 @ 19:57) >   1. Left ventricular ejection fraction, by visual estimation, is 30 to   35%.   2. There is no left ventricular hypertrophy.   3. Biatrial enlargement.   4. Right ventricular dilatation.   5. Moderate mitral valve regurgitation.   6. Mild tricuspid regurgitation.   7. Pulmonic valve regurgitation.   8. Moderately to severely decreased global left ventricular systolic   function.      < end of copied text >

## 2018-07-21 NOTE — PROGRESS NOTE ADULT - PROBLEM SELECTOR PLAN 4
ischemic hepatitis is resolving ischemic hepatitis is resolving.  Post NAC treatment for presumed fulminant liver failure which is substantially improved

## 2018-07-21 NOTE — PROGRESS NOTE ADULT - PROBLEM SELECTOR PLAN 5
mixed metabolic and resp  was on bicarb drip  Start Diaxmox 250 q 12 H  rpeat ABG in AM mixed metabolic and resp  was on bicarb drip prior  Start Diaxmox 250 q 12 H x 2-3 doses  repeat ABG in AM

## 2018-07-21 NOTE — PROGRESS NOTE ADULT - PROBLEM SELECTOR PLAN 3
B/C tach on Ps, resolved once back on assist  HAs Bi V failure as well  Put back on assist control and wean again in AM B/C tachy to 160s in A tach on PS, resolved once I put back on assist control  Has Bi V failure as well which complicates weaning and a metabolic acidosis which complicates diuresis, thou CXR clear.   Put back on assist control and wean again in AM

## 2018-07-21 NOTE — PROGRESS NOTE ADULT - SUBJECTIVE AND OBJECTIVE BOX
TMAX - 98.9    On day # 3 Meropenem / # 3 Vanco / # 3 Diflucan now    Vital Signs Last 24 Hrs  T(C): 35.6 (21 Jul 2018 15:30), Max: 36.8 (20 Jul 2018 19:30)  T(F): 96 (21 Jul 2018 15:30), Max: 98.3 (20 Jul 2018 19:30)  HR: 72 (21 Jul 2018 17:34) (70 - 172)  BP: 94/77 (21 Jul 2018 14:30) (76/65 - 107/89)  BP(mean): 84 (21 Jul 2018 14:30) (68 - 97)  RR: 19 (21 Jul 2018 14:30) (6 - 24)  SpO2: 100% (21 Jul 2018 17:34) (99% - 100%)  Mode: AC/ CMV (Assist Control/ Continuous Mandatory Ventilation)  RR (machine): 12  TV (machine): 450  FiO2: 45  PEEP: 5  ITime: 1  MAP: 10  PIP: 27  Supplemental O2:  on 45% FIO2 + 5 PEEP      Remains sedated on ventilator but requiring decreased doses of Levophed for BP support.  Lyons draining purulent-appearing blood-tinged urine.  Above notes appreciated.      PHYSICAL EXAM  General:  sedated on ventilator, lying in a semi-upright position in bed , appears comfortable at present, with notable anasarca  HEENT:  conj pink, sclerae muddy in appearance, PERRLA, orally intubated and with NGT in place with feedings in progress now  Neck:  semi-supple, no nodes noted            RIJ CVP in place - site clean with dressing dry and intact now - placed 7 /19/18  Heart:  RR  Lungs:  few rhonchi bilaterally with decreased BS at bases  Abdomen:  BS+, semi-soft, + abdominal wall edema and mild erythema across lower abdomen/ suprapubic area  Extremities:  2+ edema throughout                     chronic skin changes both LE's  Skin:  warm, dry, no rash noted  Lyons in place with blood -tinged purulent appearing sadia-colored urine in the tubing and collection bag      I&O's Summary ;    20 Jul 2018 07:01  -  21 Jul 2018 07:00  --------------------------------------------------------  IN: 3836.6 mL / OUT: 1470 mL / NET: 2366.6 mL    21 Jul 2018 07:01  -  21 Jul 2018 17:57  --------------------------------------------------------  IN: 863.8 mL / OUT: 125 mL / NET: 738.8 mL      LABS:  CBC Full  -  ( 21 Jul 2018 04:14 )  WBC Count : 9.96 K/uL  Hemoglobin : 8.8 g/dL  Hematocrit : 26.6 %  Platelet Count - Automated : 116 K/uL  Mean Cell Volume : 84.4 fl  Mean Cell Hemoglobin : 27.9 pg  Mean Cell Hemoglobin Concentration : 33.1 gm/dL  Auto Neutrophil # : 6.53 K/uL  Auto Lymphocyte # : 2.48 K/uL  Auto Monocyte # : 0.82 K/uL  Auto Eosinophil # : 0.06 K/uL  Auto Basophil # : 0.02 K/uL  Auto Neutrophil % : 65.6 %  Auto Lymphocyte % : 24.9 %  Auto Monocyte % : 8.2 %  Auto Eosinophil % : 0.6 %  Auto Basophil % : 0.2 %    07-21    141  |  102  |  28<H>  ----------------------------<  94  2.5<LL>   |  29  |  0.75    Ca    7.6<L>      21 Jul 2018 04:14  Phos  1.2     07-21  Mg     1.8     07-21    TPro  5.5<L>  /  Alb  1.8<L>  /  TBili  5.1<H>  /  DBili  4.18<H>  /  AST  61<H>  /  ALT  73  /  AlkPhos  92  07-21    LIVER FUNCTIONS - ( 21 Jul 2018 04:14 )  Alb: 1.8 g/dL / Pro: 5.5 gm/dL / ALK PHOS: 92 U/L / ALT: 73 U/L / AST: 61 U/L / GGT: x             PT/INR - ( 20 Jul 2018 02:15 )   PT: 30.8 sec;   INR: 2.77 ratio         ABG - ( 21 Jul 2018 08:57 )  pH, Arterial: x     pH, Blood: 7.60  /  pCO2: 30    /  pO2: 152   / HCO3: 30    / Base Excess: 7.7   /  SaO2: 100         CARDIAC MARKERS ( 19 Jul 2018 21:36 )  .092 ng/mL / x     / 83 U/L / x     / x        HCG Quantitative, Serum: <1 mIU/mL (07-19 @ 13:53)    Sedimentation Rate, Erythrocyte: 7 mm/hr (07-20 @ 02:15)    Rapid HIV-1/2 Antibody: Nonreact (07-19 @ 13:59)    Vancomycin Level, Trough: 7.5 ug/mL (07-20 @ 02:15)      Amylase, Serum Total: 60 U/L (07-20 @ 02:15)    Lipase, Serum: 270 U/L (07-20 @ 02:15)    Amylase, Serum Total: 69 U/L (07-19 @ 12:38)    Lipase, Serum: 169 U/L (07-19 @ 12:38)        MICROBIOLOGY:  Specimen Source: .Urine Clean Catch (Midstream) (07-19 @ 12:46)  Culture Results:   >100,000 CFU/ml Escherichia coli  >100,000 CFU/ml Gram Negative Rods #2  >100,000 CFU/ml Coag Negative Staphylococcus  >100,000 CFU/ml Enterococcus faecalis  Susceptibility to follow. (07-19 @ 12:46)    Specimen Source: .Blood Blood-Peripheral (07-19 @ 08:31)  Culture Results:   No growth to date. (07-19 @ 08:31)    Specimen Source: .Blood Blood-Peripheral (07-19 @ 08:31)  Culture Results:   Growth in aerobic bottle: Coag Negative Staphylococcus        Legionella Antigen, Urine: Negative (07-20 @ 01:26)        Radiology:  CXR -   < from: Xray Chest 1 View AP/PA. (07.21.18 @ 08:46) >  FINDINGS:     Right internal jugular central venous catheter, endotracheal tube, and   gastric tube are noted.    There are bilateral lung opacities and pulmonary vascular congestion   without significant change. The cardiac and mediastinal contours are   prominent, which may be due to magnification from AP technique and   shallow inspiration. The osseous structures are intact.    IMPRESSION:    No change in bilateral lung opacities and pulmonary vascular congestion.          Impression:  Clinically slowly improving on present ab rx with Meropenem + Vanco + Diflucan for rx of Sepsis with Shock presumably secondary to Emphysematous Lt. Pyelonephritis with an underlying Staghorn Calculus along with associated Respiratory Failure and Multifocal PNA with pulmonary vascular congestion. LFT's including TBili are improving along with decreased WBC's and improving renal function.  1 Blood Cx ( aerobic bottle only ) + for CNS - likely represents a contaminant.  Noted preliminary Urine Cx results with EColi, a second Gm Neg Amol, CNS, and Enterococcus Faecalis - sensitivities are pending, but Enteroc Faecalis is usually sensitive to Vanco.      Suggestions:  Will continue current ab rx for now and continue to follow-up temps and labs closely.  Await sensitivity testing of the urinary isolates that I requested to be done by the Micro lab.  Discussed with the Critical Care team.

## 2018-07-21 NOTE — PROGRESS NOTE ADULT - ASSESSMENT
53yo lady who presented to the ER in respiratory distress / metabolic acidosis / hepatic encephalopathy / hepatic failure.  Intubated after a ?PEA arrest s/p DCCV for Afib with RVR.  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

## 2018-07-21 NOTE — PROGRESS NOTE ADULT - SUBJECTIVE AND OBJECTIVE BOX
Patient seen and examined bedside resting comfortably in ICU. Intubated and sedated.  Off pressors    T(F): 97.7 (07-20-18 @ 23:45), Max: 98.9 (07-20-18 @ 07:54)  HR: 70 (07-21-18 @ 03:30) (70 - 91)  BP: 88/58 (07-21-18 @ 03:30) (88/58 - 113/83)  RR: 18 (07-21-18 @ 03:30) (13 - 23)  SpO2: 100% (07-21-18 @ 03:30) (93% - 100%)    PHYSICAL EXAM:    General: NAD, intubated and sedated  Chest: Clear breaths sounds b/l  Abdomen: soft, NT/ND.   Extremities: Calf soft, nontender b/l. 2+ pitting edema.   : No suprapubic tenderness or bladder distention.  Lyons draining dark yellow/sadia colored urine with sediment in tubing.     LABS:                        8.8    9.96  )-----------( 116      ( 21 Jul 2018 04:14 )             26.6   07-20    138  |  101  |  46<H>  ----------------------------<  162<H>  3.6   |  25  |  1.30    Ca    8.1<L>      20 Jul 2018 02:15  Phos  2.0     07-20  Mg     2.0     07-20    TPro  6.4  /  Alb  2.2<L>  /  TBili  7.5<H>  /  DBili  x   /  AST  103<H>  /  ALT  105<H>  /  AlkPhos  115  07-20  PT/INR - ( 20 Jul 2018 02:15 )   PT: 30.8 sec;   INR: 2.77 ratio         PTT - ( 19 Jul 2018 06:08 )  PTT:28.0 sec  I&O's Detail    19 Jul 2018 07:01  -  20 Jul 2018 07:00  --------------------------------------------------------  IN:    Cryoprecipitate: 115 mL    FFP (Fresh Frozen Plasma): 203 mL    norepinephrine Infusion: 46 mL    norepinephrine Infusion: 80.1 mL    propofol Infusion: 197.3 mL    sodium bicarbonate  Infusion: 2850 mL    Solution: 1602.8 mL    Solution: 100 mL    Solution: 100 mL    Solution: 100 mL    Solution: 100 mL  Total IN: 5494.2 mL    OUT:    Indwelling Catheter - Urethral: 1750 mL  Total OUT: 1750 mL    Total NET: 3744.2 mL      20 Jul 2018 07:01  -  21 Jul 2018 04:44  --------------------------------------------------------  IN:    Enteral Tube Flush: 100 mL    lactated ringers.: 1280 mL    norepinephrine Infusion: 0.8 mL    propofol Infusion: 161.5 mL    sodium bicarbonate  Infusion: 600 mL    Solution: 100 mL    Solution: 250 mL    Solution: 100 mL    Solution: 250 mL    Solution: 196.8 mL    Vital HN: 310 mL  Total IN: 3349.1 mL    OUT:    Indwelling Catheter - Urethral: 750 mL  Total OUT: 750 mL    Total NET: 2599.1 mL    Assessment: 55yo Female admitted with septic shock, respiratory failure, found with emphysematous pyelonephritis with stag horn calculous of Left kidney on CT scan.  Leukocytosis, Lactate and Renal function improving.     Plan:  Continue primary medical management per ICU team  continue Antibiotics per ID, and ID follow up  Lyons, monitor urine output  f/u labs, trend renal function  Discuss with Dr. Frances

## 2018-07-21 NOTE — PROGRESS NOTE ADULT - PROBLEM SELECTOR PLAN 1
shock state resolving  Vanco merro diflucan ID on board  Will discuss changing Vanco to Zyvox given enterococcus in urine and possibility of VRE, however shock state vastly improved

## 2018-07-21 NOTE — PROGRESS NOTE ADULT - SUBJECTIVE AND OBJECTIVE BOX
NEPHROLOGY PROGRESS NOTE    CHIEF COMPLAINT:  SHARYN    HPI:  SHARYN resolved.  Very hypokalemic today.  Vented on low dose levophed.  pH 7.60 with metabolic/respiratory component.  BP is running in 90's.    ROS:  unable    EXAM:  T(F): 98.1 (07-21-18 @ 09:20)  HR: 166 (07-21-18 @ 12:30)  BP: 107/89 (07-21-18 @ 12:30)  RR: 17 (07-21-18 @ 12:30)  SpO2: 99% (07-21-18 @ 12:30)  1400 in - 700 out    Vented  Normal respiratory effort, lungs clear bilaterally  Heart RRR with no murmur, moderate dependent edema         LABS                             8.8    9.96  )-----------( 116      ( 21 Jul 2018 04:14 )             26.6          07-21    141  |  102  |  28<H>  ----------------------------<  94  2.5<LL>   |  29  |  0.75    Ca    7.6<L>      21 Jul 2018 04:14  Phos  1.2     07-21  Mg     1.8     07-21    TPro  5.5<L>  /  Alb  1.8<L>  /  TBili  5.1<H>  /  DBili  4.18<H>  /  AST  61<H>  /  ALT  73  /  AlkPhos  92  07-21    ABG 7.27--30         RADIOLOGY       Chest X-ray personally reviewed and shows less hilar fullness      ASSESSMENT:  1.  SHARYN - resolved  2.  Mixed alkalosis with respiratory > metabolic component  3.  Hypokalemia and hypophosphatemia    PLAN:  Decrease minute ventilation and give 1 dose of diamox to help lower pH  Supplement at least 100 meq potassium today (chloride and phosphate salt) in divided doses and check electrolytes this evening

## 2018-07-22 LAB
-  AMIKACIN: SIGNIFICANT CHANGE UP
-  AMIKACIN: SIGNIFICANT CHANGE UP
-  AMOXICILLIN/CLAVULANIC ACID: SIGNIFICANT CHANGE UP
-  AMPICILLIN/SULBACTAM: SIGNIFICANT CHANGE UP
-  AMPICILLIN: SIGNIFICANT CHANGE UP
-  AZTREONAM: SIGNIFICANT CHANGE UP
-  AZTREONAM: SIGNIFICANT CHANGE UP
-  CEFAZOLIN: SIGNIFICANT CHANGE UP
-  CEFEPIME: SIGNIFICANT CHANGE UP
-  CEFEPIME: SIGNIFICANT CHANGE UP
-  CEFOXITIN: SIGNIFICANT CHANGE UP
-  CEFOXITIN: SIGNIFICANT CHANGE UP
-  CEFTRIAXONE: SIGNIFICANT CHANGE UP
-  CEFTRIAXONE: SIGNIFICANT CHANGE UP
-  CIPROFLOXACIN: SIGNIFICANT CHANGE UP
-  ERTAPENEM: SIGNIFICANT CHANGE UP
-  ERTAPENEM: SIGNIFICANT CHANGE UP
-  GENTAMICIN: SIGNIFICANT CHANGE UP
-  IMIPENEM: SIGNIFICANT CHANGE UP
-  IMIPENEM: SIGNIFICANT CHANGE UP
-  LEVOFLOXACIN: SIGNIFICANT CHANGE UP
-  MEROPENEM: SIGNIFICANT CHANGE UP
-  MEROPENEM: SIGNIFICANT CHANGE UP
-  NITROFURANTOIN: SIGNIFICANT CHANGE UP
-  OXACILLIN: SIGNIFICANT CHANGE UP
-  PIPERACILLIN/TAZOBACTAM: SIGNIFICANT CHANGE UP
-  PIPERACILLIN/TAZOBACTAM: SIGNIFICANT CHANGE UP
-  RIFAMPIN: SIGNIFICANT CHANGE UP
-  TETRACYCLINE: SIGNIFICANT CHANGE UP
-  TETRACYCLINE: SIGNIFICANT CHANGE UP
-  TIGECYCLINE: SIGNIFICANT CHANGE UP
-  TIGECYCLINE: SIGNIFICANT CHANGE UP
-  TOBRAMYCIN: SIGNIFICANT CHANGE UP
-  TOBRAMYCIN: SIGNIFICANT CHANGE UP
-  TRIMETHOPRIM/SULFAMETHOXAZOLE: SIGNIFICANT CHANGE UP
-  VANCOMYCIN: SIGNIFICANT CHANGE UP
-  VANCOMYCIN: SIGNIFICANT CHANGE UP
ANION GAP SERPL CALC-SCNC: 10 MMOL/L — SIGNIFICANT CHANGE UP (ref 5–17)
BASE EXCESS BLDA CALC-SCNC: 1.6 MMOL/L — SIGNIFICANT CHANGE UP (ref -2–2)
BLOOD GAS COMMENTS: SIGNIFICANT CHANGE UP
BLOOD GAS COMMENTS: SIGNIFICANT CHANGE UP
BLOOD GAS SOURCE: SIGNIFICANT CHANGE UP
BUN SERPL-MCNC: 27 MG/DL — HIGH (ref 7–23)
CALCIUM SERPL-MCNC: 7.7 MG/DL — LOW (ref 8.5–10.1)
CHLORIDE SERPL-SCNC: 104 MMOL/L — SIGNIFICANT CHANGE UP (ref 96–108)
CO2 SERPL-SCNC: 26 MMOL/L — SIGNIFICANT CHANGE UP (ref 22–31)
CREAT SERPL-MCNC: 0.75 MG/DL — SIGNIFICANT CHANGE UP (ref 0.5–1.3)
CULTURE RESULTS: SIGNIFICANT CHANGE UP
GLUCOSE SERPL-MCNC: 107 MG/DL — HIGH (ref 70–99)
HCO3 BLDA-SCNC: 22 MMOL/L — SIGNIFICANT CHANGE UP (ref 21–29)
HCT VFR BLD CALC: 29.5 % — LOW (ref 34.5–45)
HGB BLD-MCNC: 9.8 G/DL — LOW (ref 11.5–15.5)
HOROWITZ INDEX BLDA+IHG-RTO: 0.4 — SIGNIFICANT CHANGE UP
MAGNESIUM SERPL-MCNC: 1.9 MG/DL — SIGNIFICANT CHANGE UP (ref 1.6–2.6)
MCHC RBC-ENTMCNC: 28.8 PG — SIGNIFICANT CHANGE UP (ref 27–34)
MCHC RBC-ENTMCNC: 33.2 GM/DL — SIGNIFICANT CHANGE UP (ref 32–36)
MCV RBC AUTO: 86.8 FL — SIGNIFICANT CHANGE UP (ref 80–100)
METHOD TYPE: SIGNIFICANT CHANGE UP
NRBC # BLD: 0 /100 WBCS — SIGNIFICANT CHANGE UP (ref 0–0)
ORGANISM # SPEC MICROSCOPIC CNT: SIGNIFICANT CHANGE UP
PCO2 BLDA: 23 MMHG — LOW (ref 32–46)
PH BLD: 7.59 — HIGH (ref 7.35–7.45)
PHOSPHATE SERPL-MCNC: 1.9 MG/DL — LOW (ref 2.5–4.5)
PLATELET # BLD AUTO: 136 K/UL — LOW (ref 150–400)
PO2 BLDA: 177 MMHG — HIGH (ref 74–108)
POTASSIUM SERPL-MCNC: 3.4 MMOL/L — LOW (ref 3.5–5.3)
POTASSIUM SERPL-SCNC: 3.4 MMOL/L — LOW (ref 3.5–5.3)
RBC # BLD: 3.4 M/UL — LOW (ref 3.8–5.2)
RBC # FLD: 26.1 % — HIGH (ref 10.3–14.5)
SAO2 % BLDA: 100 % — HIGH (ref 92–96)
SODIUM SERPL-SCNC: 140 MMOL/L — SIGNIFICANT CHANGE UP (ref 135–145)
WBC # BLD: 12.13 K/UL — HIGH (ref 3.8–10.5)
WBC # FLD AUTO: 12.13 K/UL — HIGH (ref 3.8–10.5)

## 2018-07-22 PROCEDURE — 71045 X-RAY EXAM CHEST 1 VIEW: CPT | Mod: 26

## 2018-07-22 PROCEDURE — 99232 SBSQ HOSP IP/OBS MODERATE 35: CPT

## 2018-07-22 RX ORDER — DIGOXIN 250 MCG
0.25 TABLET ORAL EVERY 8 HOURS
Qty: 0 | Refills: 0 | Status: COMPLETED | OUTPATIENT
Start: 2018-07-22 | End: 2018-07-23

## 2018-07-22 RX ORDER — HEPARIN SODIUM 5000 [USP'U]/ML
5000 INJECTION INTRAVENOUS; SUBCUTANEOUS EVERY 8 HOURS
Qty: 0 | Refills: 0 | Status: DISCONTINUED | OUTPATIENT
Start: 2018-07-22 | End: 2018-08-10

## 2018-07-22 RX ORDER — POTASSIUM PHOSPHATE, MONOBASIC POTASSIUM PHOSPHATE, DIBASIC 236; 224 MG/ML; MG/ML
15 INJECTION, SOLUTION INTRAVENOUS ONCE
Qty: 0 | Refills: 0 | Status: COMPLETED | OUTPATIENT
Start: 2018-07-22 | End: 2018-07-22

## 2018-07-22 RX ORDER — DIGOXIN 250 MCG
0.5 TABLET ORAL ONCE
Qty: 0 | Refills: 0 | Status: COMPLETED | OUTPATIENT
Start: 2018-07-22 | End: 2018-07-22

## 2018-07-22 RX ORDER — FENTANYL CITRATE 50 UG/ML
0.3 INJECTION INTRAVENOUS
Qty: 2500 | Refills: 0 | Status: DISCONTINUED | OUTPATIENT
Start: 2018-07-22 | End: 2018-07-24

## 2018-07-22 RX ADMIN — CHLORHEXIDINE GLUCONATE 15 MILLILITER(S): 213 SOLUTION TOPICAL at 17:28

## 2018-07-22 RX ADMIN — Medication 0.5 MILLIGRAM(S): at 12:00

## 2018-07-22 RX ADMIN — Medication 250 MILLIGRAM(S): at 05:45

## 2018-07-22 RX ADMIN — ACETAZOLAMIDE 250 MILLIGRAM(S): 250 TABLET ORAL at 05:42

## 2018-07-22 RX ADMIN — CHLORHEXIDINE GLUCONATE 1 APPLICATION(S): 213 SOLUTION TOPICAL at 12:01

## 2018-07-22 RX ADMIN — FLUCONAZOLE 100 MILLIGRAM(S): 150 TABLET ORAL at 16:33

## 2018-07-22 RX ADMIN — FENTANYL CITRATE 2.69 MICROGRAM(S)/KG/HR: 50 INJECTION INTRAVENOUS at 21:49

## 2018-07-22 RX ADMIN — MIDODRINE HYDROCHLORIDE 10 MILLIGRAM(S): 2.5 TABLET ORAL at 14:19

## 2018-07-22 RX ADMIN — PANTOPRAZOLE SODIUM 40 MILLIGRAM(S): 20 TABLET, DELAYED RELEASE ORAL at 12:00

## 2018-07-22 RX ADMIN — MIDODRINE HYDROCHLORIDE 10 MILLIGRAM(S): 2.5 TABLET ORAL at 21:49

## 2018-07-22 RX ADMIN — CHLORHEXIDINE GLUCONATE 15 MILLILITER(S): 213 SOLUTION TOPICAL at 05:45

## 2018-07-22 RX ADMIN — HEPARIN SODIUM 5000 UNIT(S): 5000 INJECTION INTRAVENOUS; SUBCUTANEOUS at 14:19

## 2018-07-22 RX ADMIN — MEROPENEM 100 MILLIGRAM(S): 1 INJECTION INTRAVENOUS at 05:45

## 2018-07-22 RX ADMIN — Medication 40 MILLIEQUIVALENT(S): at 02:03

## 2018-07-22 RX ADMIN — MEROPENEM 100 MILLIGRAM(S): 1 INJECTION INTRAVENOUS at 14:19

## 2018-07-22 RX ADMIN — HEPARIN SODIUM 5000 UNIT(S): 5000 INJECTION INTRAVENOUS; SUBCUTANEOUS at 21:49

## 2018-07-22 RX ADMIN — MIDODRINE HYDROCHLORIDE 10 MILLIGRAM(S): 2.5 TABLET ORAL at 05:45

## 2018-07-22 RX ADMIN — SODIUM CHLORIDE 80 MILLILITER(S): 9 INJECTION, SOLUTION INTRAVENOUS at 14:09

## 2018-07-22 RX ADMIN — MEROPENEM 100 MILLIGRAM(S): 1 INJECTION INTRAVENOUS at 21:49

## 2018-07-22 RX ADMIN — POTASSIUM PHOSPHATE, MONOBASIC POTASSIUM PHOSPHATE, DIBASIC 63.75 MILLIMOLE(S): 236; 224 INJECTION, SOLUTION INTRAVENOUS at 06:19

## 2018-07-22 RX ADMIN — Medication 250 MILLIGRAM(S): at 17:28

## 2018-07-22 RX ADMIN — PROPOFOL 29.94 MICROGRAM(S)/KG/MIN: 10 INJECTION, EMULSION INTRAVENOUS at 08:28

## 2018-07-22 NOTE — PROGRESS NOTE ADULT - SUBJECTIVE AND OBJECTIVE BOX
UROLOGY PROGRESS HPI:  Pt seen and examined at bedside resting comfortably, intubated.      Vital Signs Last 24 Hrs  T(C): 36.7 (22 Jul 2018 12:31), Max: 37.5 (22 Jul 2018 04:53)  T(F): 98 (22 Jul 2018 12:31), Max: 99.5 (22 Jul 2018 04:53)  HR: 68 (22 Jul 2018 13:30) (68 - 83)  BP: 98/63 (22 Jul 2018 13:30) (76/57 - 124/86)  BP(mean): 74 (22 Jul 2018 13:30) (64 - 98)  RR: 19 (22 Jul 2018 13:30) (0 - 26)  SpO2: 100% (22 Jul 2018 13:30) (99% - 100%)      PHYSICAL EXAM:    GENERAL: NAD, intubated  CHEST/LUNG: Clear to ausculation, bilaterally   HEART: RRR S1S2  ABDOMEN: NTND  : nova with clear yellow urine. no suprapubic tenderness  EXTREMITIES:  calf soft, non tender b/l    I&O's Detail    21 Jul 2018 07:01  -  22 Jul 2018 07:00  --------------------------------------------------------  IN:    lactated ringers.: 1920 mL    norepinephrine Infusion: 58.5 mL    propofol Infusion: 124.2 mL    Solution: 100 mL    Solution: 500 mL    Solution: 500 mL    Solution: 200 mL    Vital HN: 1120 mL  Total IN: 4522.7 mL    OUT:    Indwelling Catheter - Urethral: 1125 mL  Total OUT: 1125 mL    Total NET: 3397.7 mL      22 Jul 2018 07:01  -  22 Jul 2018 14:15  --------------------------------------------------------  IN:    lactated ringers.: 560 mL    propofol Infusion: 21.6 mL    Vital HN: 200 mL  Total IN: 781.6 mL    OUT:    Indwelling Catheter - Urethral: 245 mL  Total OUT: 245 mL    Total NET: 536.6 mL          LABS:                        9.8    12.13 )-----------( 136      ( 22 Jul 2018 04:15 )             29.5     07-22    140  |  104  |  27<H>  ----------------------------<  107<H>  3.4<L>   |  26  |  0.75    Ca    7.7<L>      22 Jul 2018 04:15  Phos  1.9     07-22  Mg     1.9     07-22    TPro  5.5<L>  /  Alb  1.8<L>  /  TBili  5.1<H>  /  DBili  4.18<H>  /  AST  61<H>  /  ALT  73  /  AlkPhos  92  07-21    PT/INR - ( 21 Jul 2018 20:50 )   PT: 19.7 sec;   INR: 1.79 ratio           Assessment: 53yo Female admitted with septic shock, respiratory failure, found with emphysematous pyelonephritis with stag horn calculous of Left kidney on CT scan.  Leukocytosis, Lactate and Renal function improving.     Plan:  Continue primary medical management per ICU team  continue Antibiotics per ID, and ID follow up  Serena, monitor urine output  f/u labs, trend renal function  Discussed with Dr. Frances UROLOGY PROGRESS HPI:  Pt seen and examined at bedside resting comfortably, intubated.      Vital Signs Last 24 Hrs  T(C): 36.7 (22 Jul 2018 12:31), Max: 37.5 (22 Jul 2018 04:53)  T(F): 98 (22 Jul 2018 12:31), Max: 99.5 (22 Jul 2018 04:53)  HR: 68 (22 Jul 2018 13:30) (68 - 83)  BP: 98/63 (22 Jul 2018 13:30) (76/57 - 124/86)  BP(mean): 74 (22 Jul 2018 13:30) (64 - 98)  RR: 19 (22 Jul 2018 13:30) (0 - 26)  SpO2: 100% (22 Jul 2018 13:30) (99% - 100%)      PHYSICAL EXAM:    GENERAL: NAD, intubated  CHEST/LUNG: Clear to ausculation, bilaterally   HEART: RRR S1S2  ABDOMEN: NTND  : nova with clear yellow urine. no suprapubic tenderness  EXTREMITIES:  calf soft, non tender b/l    I&O's Detail    21 Jul 2018 07:01  -  22 Jul 2018 07:00  --------------------------------------------------------  IN:    lactated ringers.: 1920 mL    norepinephrine Infusion: 58.5 mL    propofol Infusion: 124.2 mL    Solution: 100 mL    Solution: 500 mL    Solution: 500 mL    Solution: 200 mL    Vital HN: 1120 mL  Total IN: 4522.7 mL    OUT:    Indwelling Catheter - Urethral: 1125 mL  Total OUT: 1125 mL    Total NET: 3397.7 mL      22 Jul 2018 07:01  -  22 Jul 2018 14:15  --------------------------------------------------------  IN:    lactated ringers.: 560 mL    propofol Infusion: 21.6 mL    Vital HN: 200 mL  Total IN: 781.6 mL    OUT:    Indwelling Catheter - Urethral: 245 mL  Total OUT: 245 mL    Total NET: 536.6 mL          LABS:                        9.8    12.13 )-----------( 136      ( 22 Jul 2018 04:15 )             29.5     07-22    140  |  104  |  27<H>  ----------------------------<  107<H>  3.4<L>   |  26  |  0.75    Ca    7.7<L>      22 Jul 2018 04:15  Phos  1.9     07-22  Mg     1.9     07-22    TPro  5.5<L>  /  Alb  1.8<L>  /  TBili  5.1<H>  /  DBili  4.18<H>  /  AST  61<H>  /  ALT  73  /  AlkPhos  92  07-21    PT/INR - ( 21 Jul 2018 20:50 )   PT: 19.7 sec;   INR: 1.79 ratio           Assessment: 53yo Female admitted with septic shock, respiratory failure, found with emphysematous pyelonephritis with stag horn calculous of Left kidney on CT scan.    Plan:  Continue primary medical management per ICU team  continue Antibiotics per ID, and ID follow up  Nova, monitor urine output  f/u labs, trend renal function  Discussed with Dr. Frances

## 2018-07-22 NOTE — PROGRESS NOTE ADULT - PROBLEM SELECTOR PLAN 2
start dig for atach/a fib with RVR  Attempt wenaing after digitalized  has Bi V failure making weaning difficult  c/w tube feeds start dig for atach/a fib with RVR  Attempt wenaing after digitalized  has Bi V failure making weaning difficult  c/w tube feeds  decreased resp rate to 10 from 12 for resp alkalosis  restarted SQ heparin, on peridex,protonis HOB > 30

## 2018-07-22 NOTE — PROGRESS NOTE ADULT - SUBJECTIVE AND OBJECTIVE BOX
NEPHROLOGY PROGRESS NOTE    CHIEF COMPLAINT:  SHARYN    HPI:  SHARYN resolved.  Still alkalemic but HCO3 normal.  Hypokalemia and hypophosphatemia improved.  LR infusing at 80 mL/hr.  BP remains low 80-90's mm Hg.    ROS:  unable    EXAM:  T(F): 96 (07-22-18 @ 07:30)  HR: 81 (07-22-18 @ 11:30)  BP: 87/75 (07-22-18 @ 11:30)  RR: 21 (07-22-18 @ 11:30)  SpO2: 100% (07-22-18 @ 11:30)  4500 in - 1100 out    Normal respiratory effort, lungs clear bilaterally  Heart RRR with no murmur, moderate dependent edema         LABS                             9.8    12.13 )-----------( 136      ( 22 Jul 2018 04:15 )             29.5          07-22    140  |  104  |  27<H>  ----------------------------<  107<H>  3.4<L>   |  26  |  0.75    Ca    7.7<L>      22 Jul 2018 04:15  Phos  1.9     07-22  Mg     1.9     07-22    TPro  5.5<L>  /  Alb  1.8<L>  /  TBili  5.1<H>  /  DBili  4.18<H>  /  AST  61<H>  /  ALT  73  /  AlkPhos  92  07-21    ABG 7.60--22         RADIOLOGY       Chest X-ray personally reviewed and shows underinflation, small left sided effusion or atelectasis    ASSESSMENT:  1.  SHARYN - resolved  2.  Primary respiratory alkalosis, overbreathing on vent  3.  Hypokalemia & hypophosphatemia  4.  3rd spacing of fluid with severe hypoalbuminemia    PLAN:  Continue to supplement KPO4/KCl  Would consider d/c IV fluid at this point
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
## 2018-07-22 NOTE — PROGRESS NOTE ADULT - SUBJECTIVE AND OBJECTIVE BOX
HPI:  53yo lady who presented to the ER in respiratory distress / metabolic acidosis / hepatic encephalopathy / hepatic failure.  Intubated after a ?PEA arrest s/p DCCV for Afib with RVR.  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 1000 milliLiter(s) (80 mL/Hr) IV Continuous <Continuous>  meropenem  IVPB 1000 milliGRAM(s) IV Intermittent every 8 hours  meropenem  IVPB      midodrine 10 milliGRAM(s) Oral three times a day  pantoprazole   Suspension 40 milliGRAM(s) Oral daily  propofol Infusion 50 MICROgram(s)/kG/Min (29.94 mL/Hr) IV Continuous <Continuous>  vancomycin  IVPB      vancomycin  IVPB 750 milliGRAM(s) IV Intermittent every 12 hours    MEDICATIONS  (PRN):      PHYSICAL EXAMINATION:  -----------------------------  Vital Signs Last 24 Hrs  T(C): 35.6 (22 Jul 2018 07:30), Max: 37.5 (22 Jul 2018 04:53)  T(F): 96 (22 Jul 2018 07:30), Max: 99.5 (22 Jul 2018 04:53)  HR: 81 (22 Jul 2018 11:30) (68 - 172)  BP: 87/75 (22 Jul 2018 11:30) (76/57 - 124/86)  BP(mean): 80 (22 Jul 2018 11:30) (64 - 98)  RR: 21 (22 Jul 2018 11:30) (0 - 26)  SpO2: 100% (22 Jul 2018 11:30) (99% - 100%)    Constitutional: intubated  Eyes: the conjunctiva exhibited no abnormalities and the eyelids demonstrated no xanthelasmas.   HEENT: normal oral mucosa, no oral pallor and no oral cyanosis.   Neck: normal jugular venous A waves present, normal jugular venous V waves present and no jugular venous mcknight A waves.   Pulmonary: no respiratory distress on vent support, normal respiratory rhythm and effort, no accessory muscle use and lungs were clear  Cardiovascular: irreg irreg; 2/6 SM  Abdomen: soft, non-tender, no hepato-splenomegaly  Musculoskeletal: the gait could not be assessed.  Extremities: no clubbing of the fingernails, no localized cyanosis  Skin: normal skin color and pigmentation, no rash, no venous stasis    LABS:   --------                                   9.8    12.13 )-----------( 136      ( 22 Jul 2018 04:15 )             29.5   07-22    140  |  104  |  27<H>  ----------------------------<  107<H>  3.4<L>   |  26  |  0.75    Ca    7.7<L>      22 Jul 2018 04:15  Phos  1.9     07-22  Mg     1.9     07-22    TPro  5.5<L>  /  Alb  1.8<L>  /  TBili  5.1<H>  /  DBili  4.18<H>  /  AST  61<H>  /  ALT  73  /  AlkPhos  92  07-21    PT/INR - ( 21 Jul 2018 20:50 )   PT: 19.7 sec;   INR: 1.79 ratio            CARDIAC MARKERS ( 19 Jul 2018 21:36 )  .092 ng/mL / x     / 83 U/L / x     / x      CARDIAC MARKERS ( 19 Jul 2018 13:53 )  .077 ng/mL / x     / 93 U/L / x     / x            INTERPRETATION:  CT of the chest, abdomen, and pelvis without contrast    Indication: Shock. Respiratory failure. Jaundice. Elevated bilirubin.    Technique: Axial multidetector CT images of the chest, abdomen, and   pelvis are acquired without IV or oral contrast.    Comparison: None.    Findings:    Chest: Mild bilateral pleural effusions. Small right pericardial   effusion. Cardiomegaly. No aortic aneurysm. Allowing for the noncontrast   technique, there is no grossly enlarged mediastinal, hilar, or axillary   lymph node.    ET tube terminates above the nivia. NG tube terminates in the stomach.    Small densities in the right mainstem bronchus may represent mucous   material.    Mild emphysematous changes in the right lung, suggestive of COPD. Small   bilateral atelectasis. Mild hazy groundglass densities in both lungs may   represent pulmonary edema or pneumonia. No evidence for pneumothorax.    Abdomen: Evaluation of the abdomen and pelvis is limited by lack of IV   and oral contrast. Allowing for noncontrast technique, the liver,   pancreas, spleen, and the right kidney appear grossly unremarkable. There   is a staghorn calculus in the left kidney. Apparent air in the left renal   calyces. No hydronephrosis.    Nonspecific adrenal thickening bilaterally.    The gallbladder appears contracted. Pericholecystic fluid versus   gallbladder wall edema. The common bile duct is not visualized on this   limited examination. If clinically indicated, abdominal ultrasound may be   pursued for further evaluation.    No bowel obstruction, or grossly thickened bowel wall. The appendix is   not identified. No evidence free air, or enlarged lymph node. Mild to   moderate ascites in the abdomen and pelvis.    Pelvis: There is a Lyons catheter in the urinary bladder which contains   air. The urinary bladder is underdistended, rendering evaluation   difficult. The bowel structures appear grossly unremarkable. No grossly   enlarged pelvic lymph node.    There is diffuse body wall edema in the chest, abdomen, and pelvis.    Impression: Anasarca.    Mild bilateral pleural effusions. Small right pericardial effusion.    Small densities in the right mainstem bronchus may represent mucous   material. Follow-up chest CT may be pursued in 4-6 weeks to ensure   clearance.    Mild hazy groundglass densities in both lungs may represent pulmonary   edema or pneumonia. Clinical correlation is recommended.    The gallbladder appears contracted. Pericholecystic fluid versus   gallbladder wall edema. The common bile duct is not visualized on this   limited examination. If clinically indicated, abdominal ultrasound may be   pursued for further evaluation. Alternatively, abdominal MR without and   with IV contrast including MRCP may be pursued.    No bowel obstruction, or grossly thickened bowel wall. The appendix is   not identified.    Mild to moderate ascites in the abdomen and pelvis.    Lyons catheter in the urinary bladder. Associated air in the urinary   bladder. Staghorn calculus in the left kidney. Apparent air in the left   renal calyces may be due to reflux from the urinary bladder or may   represent emphysematous pyelitis. Clinical correlation is recommended.    < end of copied text >    < from: US Duplex Venous Lower Ext Complete, Bilateral (07.19.18 @ 16:51) >  IMPRESSION:     No evidence of bilateral lower extremity deep venous thrombosis.    < end of copied text >        < from: TTE Echo Doppler w/o Cont (07.19.18 @ 19:57) >   1. Left ventricular ejection fraction, by visual estimation, is 30 to   35%.   2. There is no left ventricular hypertrophy.   3. Biatrial enlargement.   4. Right ventricular dilatation.   5. Moderate mitral valve regurgitation.   6. Mild tricuspid regurgitation.   7. Pulmonic valve regurgitation.   8. Moderately to severely decreased global left ventricular systolic   function.      < end of copied text >

## 2018-07-22 NOTE — PROGRESS NOTE ADULT - ASSESSMENT
55yo lady who presented to the ER in respiratory distress / metabolic acidosis / hepatic encephalopathy / hepatic failure.  Intubated after a ?PEA arrest s/p DCCV for Afib with RVR.  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  HR currently 80s.  -supportive care  -replete lytes  -monitor creat/LFTs  -repeat 2D echo prior to d/c; cardiomyopathy likely 2/2 septic shock

## 2018-07-22 NOTE — PROGRESS NOTE ADULT - PROBLEM SELECTOR PLAN 1
off pressors BLDCX negative  C/W Vanco merro diflucan,  ID on board  will re image at some point off pressors BLDCX negative, UCX pos in setting of emphysmeatous pyelo  C/W [place] tressaro diflucan,  ID on board  will re-image at some point

## 2018-07-23 LAB
A1AT SERPL-MCNC: 146 MG/DL — SIGNIFICANT CHANGE UP (ref 90–200)
ALBUMIN SERPL ELPH-MCNC: 1.8 G/DL — LOW (ref 3.3–5)
ALP SERPL-CCNC: 97 U/L — SIGNIFICANT CHANGE UP (ref 40–120)
ALT FLD-CCNC: 55 U/L — SIGNIFICANT CHANGE UP (ref 12–78)
ANION GAP SERPL CALC-SCNC: 8 MMOL/L — SIGNIFICANT CHANGE UP (ref 5–17)
AST SERPL-CCNC: 42 U/L — HIGH (ref 15–37)
BASE EXCESS BLDV CALC-SCNC: 3.7 MMOL/L — HIGH (ref -2–2)
BILIRUB DIRECT SERPL-MCNC: 3.72 MG/DL — HIGH (ref 0.05–0.2)
BILIRUB INDIRECT FLD-MCNC: 0.8 MG/DL — SIGNIFICANT CHANGE UP (ref 0.2–1)
BILIRUB SERPL-MCNC: 4.5 MG/DL — HIGH (ref 0.2–1.2)
BLOOD GAS COMMENTS, VENOUS: SIGNIFICANT CHANGE UP
BUN SERPL-MCNC: 24 MG/DL — HIGH (ref 7–23)
CALCIUM SERPL-MCNC: 7.6 MG/DL — LOW (ref 8.5–10.1)
CHLORIDE SERPL-SCNC: 107 MMOL/L — SIGNIFICANT CHANGE UP (ref 96–108)
CO2 SERPL-SCNC: 25 MMOL/L — SIGNIFICANT CHANGE UP (ref 22–31)
CREAT SERPL-MCNC: 0.74 MG/DL — SIGNIFICANT CHANGE UP (ref 0.5–1.3)
DIGOXIN SERPL-MCNC: 0.75 NG/ML — LOW (ref 0.8–2)
GAS PNL BLDV: SIGNIFICANT CHANGE UP
GLUCOSE SERPL-MCNC: 76 MG/DL — SIGNIFICANT CHANGE UP (ref 70–99)
GRAM STN FLD: SIGNIFICANT CHANGE UP
HCO3 BLDV-SCNC: 28 MMOL/L — SIGNIFICANT CHANGE UP (ref 21–29)
HCT VFR BLD CALC: 26.6 % — LOW (ref 34.5–45)
HGB BLD-MCNC: 8.6 G/DL — LOW (ref 11.5–15.5)
HOROWITZ INDEX BLDV+IHG-RTO: 30 — SIGNIFICANT CHANGE UP
MAGNESIUM SERPL-MCNC: 1.9 MG/DL — SIGNIFICANT CHANGE UP (ref 1.6–2.6)
MCHC RBC-ENTMCNC: 28.9 PG — SIGNIFICANT CHANGE UP (ref 27–34)
MCHC RBC-ENTMCNC: 32.3 GM/DL — SIGNIFICANT CHANGE UP (ref 32–36)
MCV RBC AUTO: 89.3 FL — SIGNIFICANT CHANGE UP (ref 80–100)
NRBC # BLD: 0 /100 WBCS — SIGNIFICANT CHANGE UP (ref 0–0)
PCO2 BLDV: 47 MMHG — SIGNIFICANT CHANGE UP (ref 35–50)
PH BLDV: 7.4 — SIGNIFICANT CHANGE UP (ref 7.35–7.45)
PHOSPHATE SERPL-MCNC: 2.1 MG/DL — LOW (ref 2.5–4.5)
PLATELET # BLD AUTO: 105 K/UL — LOW (ref 150–400)
PO2 BLDV: <44 MMHG — SIGNIFICANT CHANGE UP (ref 25–45)
POTASSIUM SERPL-MCNC: 3.6 MMOL/L — SIGNIFICANT CHANGE UP (ref 3.5–5.3)
POTASSIUM SERPL-SCNC: 3.6 MMOL/L — SIGNIFICANT CHANGE UP (ref 3.5–5.3)
PROT SERPL-MCNC: 5.7 GM/DL — LOW (ref 6–8.3)
RBC # BLD: 2.98 M/UL — LOW (ref 3.8–5.2)
RBC # FLD: 26 % — HIGH (ref 10.3–14.5)
SAO2 % BLDV: 45 % — LOW (ref 67–88)
SODIUM SERPL-SCNC: 140 MMOL/L — SIGNIFICANT CHANGE UP (ref 135–145)
SPECIMEN SOURCE: SIGNIFICANT CHANGE UP
SSDNA AB SER-ACNC: 36 EU — HIGH (ref 0–19)
VANCOMYCIN TROUGH SERPL-MCNC: 16.7 UG/ML — SIGNIFICANT CHANGE UP (ref 10–20)
WBC # BLD: 10.4 K/UL — SIGNIFICANT CHANGE UP (ref 3.8–10.5)
WBC # FLD AUTO: 10.4 K/UL — SIGNIFICANT CHANGE UP (ref 3.8–10.5)

## 2018-07-23 PROCEDURE — 99291 CRITICAL CARE FIRST HOUR: CPT

## 2018-07-23 PROCEDURE — 99232 SBSQ HOSP IP/OBS MODERATE 35: CPT

## 2018-07-23 RX ORDER — POTASSIUM PHOSPHATE, MONOBASIC POTASSIUM PHOSPHATE, DIBASIC 236; 224 MG/ML; MG/ML
15 INJECTION, SOLUTION INTRAVENOUS ONCE
Qty: 0 | Refills: 0 | Status: COMPLETED | OUTPATIENT
Start: 2018-07-23 | End: 2018-07-23

## 2018-07-23 RX ORDER — FUROSEMIDE 40 MG
40 TABLET ORAL ONCE
Qty: 0 | Refills: 0 | Status: COMPLETED | OUTPATIENT
Start: 2018-07-23 | End: 2018-07-23

## 2018-07-23 RX ORDER — FUROSEMIDE 40 MG
20 TABLET ORAL ONCE
Qty: 0 | Refills: 0 | Status: COMPLETED | OUTPATIENT
Start: 2018-07-23 | End: 2018-07-23

## 2018-07-23 RX ADMIN — HEPARIN SODIUM 5000 UNIT(S): 5000 INJECTION INTRAVENOUS; SUBCUTANEOUS at 06:06

## 2018-07-23 RX ADMIN — CHLORHEXIDINE GLUCONATE 15 MILLILITER(S): 213 SOLUTION TOPICAL at 06:06

## 2018-07-23 RX ADMIN — POTASSIUM PHOSPHATE, MONOBASIC POTASSIUM PHOSPHATE, DIBASIC 63.75 MILLIMOLE(S): 236; 224 INJECTION, SOLUTION INTRAVENOUS at 06:51

## 2018-07-23 RX ADMIN — CHLORHEXIDINE GLUCONATE 1 APPLICATION(S): 213 SOLUTION TOPICAL at 12:48

## 2018-07-23 RX ADMIN — MEROPENEM 100 MILLIGRAM(S): 1 INJECTION INTRAVENOUS at 23:03

## 2018-07-23 RX ADMIN — MIDODRINE HYDROCHLORIDE 10 MILLIGRAM(S): 2.5 TABLET ORAL at 23:02

## 2018-07-23 RX ADMIN — PANTOPRAZOLE SODIUM 40 MILLIGRAM(S): 20 TABLET, DELAYED RELEASE ORAL at 12:48

## 2018-07-23 RX ADMIN — Medication 40 MILLIGRAM(S): at 15:01

## 2018-07-23 RX ADMIN — Medication 20 MILLIGRAM(S): at 09:25

## 2018-07-23 RX ADMIN — MIDODRINE HYDROCHLORIDE 10 MILLIGRAM(S): 2.5 TABLET ORAL at 13:25

## 2018-07-23 RX ADMIN — FLUCONAZOLE 100 MILLIGRAM(S): 150 TABLET ORAL at 17:16

## 2018-07-23 RX ADMIN — Medication 250 MILLIGRAM(S): at 17:16

## 2018-07-23 RX ADMIN — MEROPENEM 100 MILLIGRAM(S): 1 INJECTION INTRAVENOUS at 06:06

## 2018-07-23 RX ADMIN — HEPARIN SODIUM 5000 UNIT(S): 5000 INJECTION INTRAVENOUS; SUBCUTANEOUS at 23:03

## 2018-07-23 RX ADMIN — MIDODRINE HYDROCHLORIDE 10 MILLIGRAM(S): 2.5 TABLET ORAL at 06:06

## 2018-07-23 RX ADMIN — CHLORHEXIDINE GLUCONATE 15 MILLILITER(S): 213 SOLUTION TOPICAL at 17:16

## 2018-07-23 RX ADMIN — Medication 250 MILLIGRAM(S): at 06:06

## 2018-07-23 RX ADMIN — MEROPENEM 100 MILLIGRAM(S): 1 INJECTION INTRAVENOUS at 13:25

## 2018-07-23 NOTE — PROGRESS NOTE ADULT - SUBJECTIVE AND OBJECTIVE BOX
Patient seen and examined bedside in CCU, intubated and sedated on fentanyl but arousable  Pt denies pain  Tolerating vent and tube feeds per nursing    T(F): 97.6 (07-23-18 @ 04:00), Max: 98.1 (07-22-18 @ 19:45)  HR: 52 (07-23-18 @ 06:30) (42 - 150)  BP: 101/73 (07-23-18 @ 06:30) (81/72 - 127/103)  RR: 0 (07-23-18 @ 06:30) (0 - 26)  SpO2: 100% (07-23-18 @ 06:30) (99% - 100%)  Wt(kg): --  CAPILLARY BLOOD GLUCOSE    PHYSICAL EXAM:  General: opens eyes and follows commands  HEENT: orally intubated, NGT Tube feeds running  CV: +S1+S2   Lung: vented  Abdomen: soft, NTND  Extremities: mild anasarca  : nova catheter indwelling draining sadia colored urine, output 1000cc  Rectal: flexiseal with liquid brown stool, output 300cc    LABS:                        8.6    10.40 )-----------( 105      ( 23 Jul 2018 04:25 )             26.6     07-22    140  |  104  |  27<H>  ----------------------------<  107<H>  3.4<L>   |  26  |  0.75    Ca    7.7<L>      22 Jul 2018 04:15  Phos  2.1     07-23  Mg     1.9     07-23    TPro  5.7<L>  /  Alb  1.8<L>  /  TBili  4.5<H>  /  DBili  3.72<H>  /  AST  42<H>  /  ALT  55  /  AlkPhos  97  07-23    PT/INR - ( 21 Jul 2018 20:50 )   PT: 19.7 sec;   INR: 1.79 ratio         Culture Results:   >100,000 CFU/ml Escherichia coli  >100,000 CFU/ml Klebsiella pneumoniae  >100,000 CFU/ml Coag Negative Staphylococcus  >100,000 CFU/ml Enterococcus faecalis (07-19 @ 12:46)  Culture Results:   No growth to date. (07-19 @ 08:31)  Culture Results:   Growth in aerobic bottle: Coag Negative Staphylococcus  Single set isolate, possible contaminant. Contact  Microbiology if susceptibility testing clinically  indicated.  "Due to technical problems, Proteus sp. will Not be reported as part of  the BCID panel until further notice"  ***Blood Panel PCR results on this specimen are available  approximately 3 hours after the Gram stain result.***  Gram stain, PCR, and/or culture results may not always  correspond due to difference in methodologies.  ************************************************************  This PCR assay was performed using [employer].  The following targets are tested for: Enterococcus,  vancomycin resistant enterococci, Listeria monocytogenes,  coagulase negative staphylococci, S. aureus,  methicillin resistant S. aureus, Streptococcus agalactiae  (Group B), S. pneumoniae, S. pyogenes (Group A),  Acinetobacter baumannii, Enterobacter cloacae, E. coli,  Klebsiella oxytoca, K. pneumoniae, Proteus sp.,  Serratia marcescens, Haemophilus influenzae,  Neisseria meningitidis, Pseudomonas aeruginosa, Candida  albicans, C. glabrata, C krusei, C parapsilosis,  C. tropicalis and the KPC resistance gene. (07-19 @ 08:31)      Assessment: 53yo Female admitted with septic shock due to Left emphysematous pyelonephritis, respiratory failure, found with stag horn calculous of Left kidney on CT.   Urine culture 7/19 with + e coli, klebsiella, CNS and enterococcus faecalis.   Leukocytosis resolved    Plan:  Continue primary medical management per ICU team  continue Antibiotics per ID, sensitivity reviewed, continue vanco/merrem  continue nova, monitor urine output

## 2018-07-23 NOTE — PROGRESS NOTE ADULT - SUBJECTIVE AND OBJECTIVE BOX
HPI:  55yo lady who presented to the ER in respiratory distress / metabolic acidosis / hepatic encephalopathy / hepatic failure.  Intubated after a ?PEA arrest s/p DCCV for Afib with RVR.  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 1000 milliLiter(s) (80 mL/Hr) IV Continuous <Continuous>  meropenem  IVPB 1000 milliGRAM(s) IV Intermittent every 8 hours  meropenem  IVPB      midodrine 10 milliGRAM(s) Oral three times a day  pantoprazole   Suspension 40 milliGRAM(s) Oral daily  propofol Infusion 50 MICROgram(s)/kG/Min (29.94 mL/Hr) IV Continuous <Continuous>  vancomycin  IVPB      vancomycin  IVPB 750 milliGRAM(s) IV Intermittent every 12 hours    MEDICATIONS  (PRN):      PHYSICAL EXAMINATION:  -----------------------------  Vital Signs Last 24 Hrs  T(C): 35.6 (22 Jul 2018 07:30), Max: 37.5 (22 Jul 2018 04:53)  T(F): 96 (22 Jul 2018 07:30), Max: 99.5 (22 Jul 2018 04:53)  HR: 81 (22 Jul 2018 11:30) (68 - 172)  BP: 87/75 (22 Jul 2018 11:30) (76/57 - 124/86)  BP(mean): 80 (22 Jul 2018 11:30) (64 - 98)  RR: 21 (22 Jul 2018 11:30) (0 - 26)  SpO2: 100% (22 Jul 2018 11:30) (99% - 100%)    Constitutional: intubated  Eyes: the conjunctiva exhibited no abnormalities and the eyelids demonstrated no xanthelasmas.   HEENT: normal oral mucosa, no oral pallor and no oral cyanosis.   Neck: normal jugular venous A waves present, normal jugular venous V waves present and no jugular venous mcknight A waves.   Pulmonary: no respiratory distress on vent support, normal respiratory rhythm and effort, no accessory muscle use and lungs were clear  Cardiovascular: irreg irreg; 2/6 SM  Abdomen: soft, non-tender, no hepato-splenomegaly  Musculoskeletal: the gait could not be assessed.  Extremities: no clubbing of the fingernails, no localized cyanosis  Skin: normal skin color and pigmentation, no rash, no venous stasis    LABS:   --------                                   9.8    12.13 )-----------( 136      ( 22 Jul 2018 04:15 )             29.5   07-22    140  |  104  |  27<H>  ----------------------------<  107<H>  3.4<L>   |  26  |  0.75    Ca    7.7<L>      22 Jul 2018 04:15  Phos  1.9     07-22  Mg     1.9     07-22    TPro  5.5<L>  /  Alb  1.8<L>  /  TBili  5.1<H>  /  DBili  4.18<H>  /  AST  61<H>  /  ALT  73  /  AlkPhos  92  07-21    PT/INR - ( 21 Jul 2018 20:50 )   PT: 19.7 sec;   INR: 1.79 ratio            CARDIAC MARKERS ( 19 Jul 2018 21:36 )  .092 ng/mL / x     / 83 U/L / x     / x      CARDIAC MARKERS ( 19 Jul 2018 13:53 )  .077 ng/mL / x     / 93 U/L / x     / x            INTERPRETATION:  CT of the chest, abdomen, and pelvis without contrast    Indication: Shock. Respiratory failure. Jaundice. Elevated bilirubin.    Technique: Axial multidetector CT images of the chest, abdomen, and   pelvis are acquired without IV or oral contrast.    Comparison: None.    Findings:    Chest: Mild bilateral pleural effusions. Small right pericardial   effusion. Cardiomegaly. No aortic aneurysm. Allowing for the noncontrast   technique, there is no grossly enlarged mediastinal, hilar, or axillary   lymph node.    ET tube terminates above the nivia. NG tube terminates in the stomach.    Small densities in the right mainstem bronchus may represent mucous   material.    Mild emphysematous changes in the right lung, suggestive of COPD. Small   bilateral atelectasis. Mild hazy groundglass densities in both lungs may   represent pulmonary edema or pneumonia. No evidence for pneumothorax.    Abdomen: Evaluation of the abdomen and pelvis is limited by lack of IV   and oral contrast. Allowing for noncontrast technique, the liver,   pancreas, spleen, and the right kidney appear grossly unremarkable. There   is a staghorn calculus in the left kidney. Apparent air in the left renal   calyces. No hydronephrosis.    Nonspecific adrenal thickening bilaterally.    The gallbladder appears contracted. Pericholecystic fluid versus   gallbladder wall edema. The common bile duct is not visualized on this   limited examination. If clinically indicated, abdominal ultrasound may be   pursued for further evaluation.    No bowel obstruction, or grossly thickened bowel wall. The appendix is   not identified. No evidence free air, or enlarged lymph node. Mild to   moderate ascites in the abdomen and pelvis.    Pelvis: There is a Lyons catheter in the urinary bladder which contains   air. The urinary bladder is underdistended, rendering evaluation   difficult. The bowel structures appear grossly unremarkable. No grossly   enlarged pelvic lymph node.    There is diffuse body wall edema in the chest, abdomen, and pelvis.    Impression: Anasarca.    Mild bilateral pleural effusions. Small right pericardial effusion.    Small densities in the right mainstem bronchus may represent mucous   material. Follow-up chest CT may be pursued in 4-6 weeks to ensure   clearance.    Mild hazy groundglass densities in both lungs may represent pulmonary   edema or pneumonia. Clinical correlation is recommended.    The gallbladder appears contracted. Pericholecystic fluid versus   gallbladder wall edema. The common bile duct is not visualized on this   limited examination. If clinically indicated, abdominal ultrasound may be   pursued for further evaluation. Alternatively, abdominal MR without and   with IV contrast including MRCP may be pursued.    No bowel obstruction, or grossly thickened bowel wall. The appendix is   not identified.    Mild to moderate ascites in the abdomen and pelvis.    Lyons catheter in the urinary bladder. Associated air in the urinary   bladder. Staghorn calculus in the left kidney. Apparent air in the left   renal calyces may be due to reflux from the urinary bladder or may   represent emphysematous pyelitis. Clinical correlation is recommended.    < end of copied text >    < from: US Duplex Venous Lower Ext Complete, Bilateral (07.19.18 @ 16:51) >  IMPRESSION:     No evidence of bilateral lower extremity deep venous thrombosis.    < end of copied text >        < from: TTE Echo Doppler w/o Cont (07.19.18 @ 19:57) >   1. Left ventricular ejection fraction, by visual estimation, is 30 to   35%.   2. There is no left ventricular hypertrophy.   3. Biatrial enlargement.   4. Right ventricular dilatation.   5. Moderate mitral valve regurgitation.   6. Mild tricuspid regurgitation.   7. Pulmonic valve regurgitation.   8. Moderately to severely decreased global left ventricular systolic   function.      < end of copied text > 53yo Female who presented to the ER in respiratory distress / metabolic acidosis / hepatic encephalopathy / hepatic failure.  Intubated after a ?PEA arrest s/p DCCV for Afib with RVR.  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    REVIEW OF SYSTEMS:  unable to obtain from pt    MEDICATIONS  (STANDING):  chlorhexidine 0.12% Liquid 15 milliLiter(s) Swish and Spit two times a day  chlorhexidine 4% Liquid 1 Application(s) Topical <User Schedule>  digoxin  Injectable 0.5 milliGRAM(s) IV Push once  digoxin  Injectable 0.25 milliGRAM(s) IV Push every 8 hours  fluconAZOLE IVPB      fluconAZOLE IVPB 200 milliGRAM(s) IV Intermittent every 24 hours  heparin  Injectable 5000 Unit(s) SubCutaneous every 8 hours  lactated ringers. 1000 milliLiter(s) (80 mL/Hr) IV Continuous <Continuous>  meropenem  IVPB 1000 milliGRAM(s) IV Intermittent every 8 hours  meropenem  IVPB      midodrine 10 milliGRAM(s) Oral three times a day  pantoprazole   Suspension 40 milliGRAM(s) Oral daily  propofol Infusion 50 MICROgram(s)/kG/Min (29.94 mL/Hr) IV Continuous <Continuous>  vancomycin  IVPB      vancomycin  IVPB 750 milliGRAM(s) IV Intermittent every 12 hours    MEDICATIONS  (PRN):      PHYSICAL EXAMINATION:  -----------------------------  Vital Signs Last 24 Hrs  T(C): 36.8 (23 Jul 2018 11:00), Max: 36.8 (23 Jul 2018 11:00)  T(F): 98.3 (23 Jul 2018 11:00), Max: 98.3 (23 Jul 2018 11:00)  HR: 68 (23 Jul 2018 15:00) (42 - 88)  BP: 127/87 (23 Jul 2018 15:00) (83/65 - 135/95)  BP(mean): 100 (23 Jul 2018 15:00) (68 - 109)  RR: 26 (23 Jul 2018 15:00) (12 - 26)  SpO2: 100% (23 Jul 2018 15:00) (99% - 100%)    Constitutional: intubated  Eyes: the conjunctiva exhibited no abnormalities and the eyelids demonstrated no xanthelasmas.   HEENT: normal oral mucosa, no oral pallor and no oral cyanosis.   Neck: normal jugular venous A waves present, normal jugular venous V waves present and no jugular venous mcknight A waves.   Pulmonary: no respiratory distress on vent support, normal respiratory rhythm and effort, no accessory muscle use and lungs were clear  Cardiovascular: irreg irreg; 2/6 SM  Abdomen: soft, non-tender, no hepato-splenomegaly  Musculoskeletal: the gait could not be assessed.  Extremities: no clubbing of the fingernails, no localized cyanosis  Skin: normal skin color and pigmentation, no rash, no venous stasis    LABS:   --------                                   9.8    12.13 )-----------( 136      ( 22 Jul 2018 04:15 )             29.5   07-22    140  |  104  |  27<H>  ----------------------------<  107<H>  3.4<L>   |  26  |  0.75    Ca    7.7<L>      22 Jul 2018 04:15  Phos  1.9     07-22  Mg     1.9     07-22    TPro  5.5<L>  /  Alb  1.8<L>  /  TBili  5.1<H>  /  DBili  4.18<H>  /  AST  61<H>  /  ALT  73  /  AlkPhos  92  07-21    PT/INR - ( 21 Jul 2018 20:50 )   PT: 19.7 sec;   INR: 1.79 ratio            CARDIAC MARKERS ( 19 Jul 2018 21:36 )  .092 ng/mL / x     / 83 U/L / x     / x      CARDIAC MARKERS ( 19 Jul 2018 13:53 )  .077 ng/mL / x     / 93 U/L / x     / x            INTERPRETATION:  CT of the chest, abdomen, and pelvis without contrast    Indication: Shock. Respiratory failure. Jaundice. Elevated bilirubin.    Technique: Axial multidetector CT images of the chest, abdomen, and   pelvis are acquired without IV or oral contrast.    Comparison: None.    Findings:    Chest: Mild bilateral pleural effusions. Small right pericardial   effusion. Cardiomegaly. No aortic aneurysm. Allowing for the noncontrast   technique, there is no grossly enlarged mediastinal, hilar, or axillary   lymph node.    ET tube terminates above the nivia. NG tube terminates in the stomach.    Small densities in the right mainstem bronchus may represent mucous   material.    Mild emphysematous changes in the right lung, suggestive of COPD. Small   bilateral atelectasis. Mild hazy groundglass densities in both lungs may   represent pulmonary edema or pneumonia. No evidence for pneumothorax.    Abdomen: Evaluation of the abdomen and pelvis is limited by lack of IV   and oral contrast. Allowing for noncontrast technique, the liver,   pancreas, spleen, and the right kidney appear grossly unremarkable. There   is a staghorn calculus in the left kidney. Apparent air in the left renal   calyces. No hydronephrosis.    Nonspecific adrenal thickening bilaterally.    The gallbladder appears contracted. Pericholecystic fluid versus   gallbladder wall edema. The common bile duct is not visualized on this   limited examination. If clinically indicated, abdominal ultrasound may be   pursued for further evaluation.    No bowel obstruction, or grossly thickened bowel wall. The appendix is   not identified. No evidence free air, or enlarged lymph node. Mild to   moderate ascites in the abdomen and pelvis.    Pelvis: There is a Lyons catheter in the urinary bladder which contains   air. The urinary bladder is underdistended, rendering evaluation   difficult. The bowel structures appear grossly unremarkable. No grossly   enlarged pelvic lymph node.    There is diffuse body wall edema in the chest, abdomen, and pelvis.    Impression: Anasarca.    Mild bilateral pleural effusions. Small right pericardial effusion.    Small densities in the right mainstem bronchus may represent mucous   material. Follow-up chest CT may be pursued in 4-6 weeks to ensure   clearance.    Mild hazy groundglass densities in both lungs may represent pulmonary   edema or pneumonia. Clinical correlation is recommended.    The gallbladder appears contracted. Pericholecystic fluid versus   gallbladder wall edema. The common bile duct is not visualized on this   limited examination. If clinically indicated, abdominal ultrasound may be   pursued for further evaluation. Alternatively, abdominal MR without and   with IV contrast including MRCP may be pursued.    No bowel obstruction, or grossly thickened bowel wall. The appendix is   not identified.    Mild to moderate ascites in the abdomen and pelvis.    Lyons catheter in the urinary bladder. Associated air in the urinary   bladder. Staghorn calculus in the left kidney. Apparent air in the left   renal calyces may be due to reflux from the urinary bladder or may   represent emphysematous pyelitis. Clinical correlation is recommended.    < end of copied text >    < from: US Duplex Venous Lower Ext Complete, Bilateral (07.19.18 @ 16:51) >  IMPRESSION:     No evidence of bilateral lower extremity deep venous thrombosis.    < end of copied text >        < from: TTE Echo Doppler w/o Cont (07.19.18 @ 19:57) >   1. Left ventricular ejection fraction, by visual estimation, is 30 to   35%.   2. There is no left ventricular hypertrophy.   3. Biatrial enlargement.   4. Right ventricular dilatation.   5. Moderate mitral valve regurgitation.   6. Mild tricuspid regurgitation.   7. Pulmonic valve regurgitation.   8. Moderately to severely decreased global left ventricular systolic   function.      < end of copied text >

## 2018-07-23 NOTE — PROGRESS NOTE ADULT - SUBJECTIVE AND OBJECTIVE BOX
TMAX - 99.5    On day # 5 Meropenem / $ 5 Vanco / # 5 Diflucan now    Vital Signs Last 24 Hrs  T(C): 36.1 (23 Jul 2018 15:22), Max: 36.8 (23 Jul 2018 11:00)  T(F): 97 (23 Jul 2018 15:22), Max: 98.3 (23 Jul 2018 11:00)  HR: 68 (23 Jul 2018 15:00) (42 - 88)  BP: 127/87 (23 Jul 2018 15:00) (83/65 - 135/95)  BP(mean): 100 (23 Jul 2018 15:00) (68 - 109)  RR: 26 (23 Jul 2018 15:00) (12 - 26)  SpO2: 100% (23 Jul 2018 15:00) (99% - 100%)  Mode: CPAP with PS  FiO2: 30  PEEP: 5  PS: 5  MAP: 7  PIP: 10  Supplemental O2:  on 30% FIO2 + 5 PEEP now    Arousable now on ventilator, denies any cp or abdominal pain, and no c/o SOB or back pain presently.  Remains off pressor support now and on only 30% FIO2 and 5 PEEP now.      PHYSICAL EXAM  General:  arousable, on ventilator, nodding her head and gesturing with her hands in response to questions, appears comfortable at present, sitting in upright position now in bed, still with anasarca  HEENT:  conj pink, sclerae muddy, PERRLA, remains orally intubated and with NGT in place with feedings in progress  Neck:  semi- supple, no nodes noted            RIJ CVP in place - site clean and dressing dry and intact - placed 7/19/18  Heart:  RR  Lungs:  decreased BS at bases bilaterally  Abdomen:  BS+, semi- firm, nontender to palpation  Extremities:  2+ edema LE's with chronic  skin changes bilaterally                     Arms and hands remain swollen as well  Skin:  warm, dry, no rash noted  Lyons in place with slightly cloudy light sadia-colored urine now  Rectal tube in place with liquid brown stool now in the bag - 300cc output in the prior 24hrs.    I&O's Summary :    22 Jul 2018 07:01  -  23 Jul 2018 07:00  --------------------------------------------------------  IN: 3957.1 mL / OUT: 1300 mL / NET: 2657.1 mL    23 Jul 2018 07:01  -  23 Jul 2018 15:45  --------------------------------------------------------  IN: 760 mL / OUT: 755 mL / NET: 5 mL        LABS:  CBC Full  -  ( 23 Jul 2018 04:25 )  WBC Count : 10.40 K/uL  Hemoglobin : 8.6 g/dL  Hematocrit : 26.6 %  Platelet Count - Automated : 105 K/uL  Mean Cell Volume : 89.3 fl  Mean Cell Hemoglobin : 28.9 pg  Mean Cell Hemoglobin Concentration : 32.3 gm/dL  Auto Neutrophil # : x  Auto Lymphocyte # : x  Auto Monocyte # : x  Auto Eosinophil # : x  Auto Basophil # : x  Auto Neutrophil % : x  Auto Lymphocyte % : x  Auto Monocyte % : x  Auto Eosinophil % : x  Auto Basophil % : x    07-23    140  |  107  |  24<H>  ----------------------------<  76  3.6   |  25  |  0.74    Ca    7.6<L>      23 Jul 2018 04:57  Phos  2.1     07-23  Mg     1.9     07-23    TPro  5.7<L>  /  Alb  1.8<L>  /  TBili  4.5<H>  /  DBili  3.72<H>  /  AST  42<H>  /  ALT  55  /  AlkPhos  97  07-23    LIVER FUNCTIONS - ( 23 Jul 2018 04:15 )  Alb: 1.8 g/dL / Pro: 5.7 gm/dL / ALK PHOS: 97 U/L / ALT: 55 U/L / AST: 42 U/L / GGT: x             PT/INR - ( 21 Jul 2018 20:50 )   PT: 19.7 sec;   INR: 1.79 ratio         ABG - ( 22 Jul 2018 08:51 )  pH, Arterial: x     pH, Blood: 7.59  /  pCO2: 23    /  pO2: 177   / HCO3: 22    / Base Excess: 1.6   /  SaO2: 100           HCG Quantitative, Serum: <1 mIU/mL (07-19 @ 13:53)      Sedimentation Rate, Erythrocyte: 7 mm/hr (07-20 @ 02:15)    Rapid HIV-1/2 Antibody: Nonreact (07-19 @ 13:59)    MAZIN - 1:320 - Homogeneous pattern ( 7/19 )    Hepacute Panel - Negative       Amylase, Serum Total: 60 U/L (07-20 @ 02:15)    Lipase, Serum: 270 U/L (07-20 @ 02:15)    Amylase, Serum Total: 69 U/L (07-19 @ 12:38)    Lipase, Serum: 169 U/L (07-19 @ 12:38)        MICROBIOLOGY:  Specimen Source: .Sputum Sputum (07-22 @ 23:26)  Gram Stain:   Moderate polymorphonuclear leukocytes per low power field  Few Squamous epithelial cells per low power field  No organisms seen per oil power field (07.22.18 @ 23:26)      Specimen Source: .Urine Clean Catch (Midstream) (07-19 @ 12:46)  Culture Results:   >100,000 CFU/ml Escherichia coli  >100,000 CFU/ml Klebsiella pneumoniae  >100,000 CFU/ml Coag Negative Staphylococcus  >100,000 CFU/ml Enterococcus faecalis (07-19 @ 12:46)  Culture - Urine (07.19.18 @ 12:46)    -  Gentamicin: S <=1 Should not be used as monotherapy    -  Gentamicin: S <=4    -  Gentamicin: S <=1    -  Imipenem: S <=1    -  Imipenem: S <=1    -  Levofloxacin: S 2    -  Levofloxacin: S <=2    -  Levofloxacin: S <=1    -  Meropenem: S <=1    -  Meropenem: S <=1    -  Nitrofurantoin: S <=32 Should not be used to treat pyelonephritis.    -  Nitrofurantoin: S <=32 Should not be used to treat pyelonephritis    -  Nitrofurantoin: S <=32 Should not be used to treat pyelonephritis    -  Oxacillin: S <=0.25    -  Piperacillin/Tazobactam: S <=16    -  Piperacillin/Tazobactam: S <=8    -  RIF- Rifampin: S <=1 Should not be used as monotherapy    -  Tetra/Doxy: S <=1    -  Tetra/Doxy: S <=1    -  Tigecycline: S <=2    -  Tigecycline: S <=1    -  Tobramycin: S <=4    -  Tobramycin: S <=2    -  Trimethoprim/Sulfamethoxazole: S <=0.5/9.5    -  Trimethoprim/Sulfamethoxazole: S <=2/38    -  Trimethoprim/Sulfamethoxazole: S <=0.5/9.5    -  Vancomycin: S 4    -  Vancomycin: S 2    -  Amikacin: S <=16    -  Amikacin: S <=8    -  Amoxicillin/Clavulanic Acid: S <=8/4    -  Ampicillin: S <=2 Predicts results to ampicillin/sulbactam, amoxacillin-clavulanate and  piperacillin-tazobactam.    -  Ampicillin: R 16 These ampicillin results predict results for amoxicillin    -  Ampicillin: S <=2 These ampicillin results predict results for amoxicillin    -  Ampicillin/Sulbactam: S <=8/4    -  Ampicillin/Sulbactam: S <=8/4    -  Ampicillin/Sulbactam: S <=4/2    -  Aztreonam: S <=4    -  Aztreonam: S <=4    -  Cefazolin: S <=4    -  Cefazolin: S <=8 This predicts results for oral agents cefaclor, cefdinir, cefpodoxime, cefprozil, cefuroxime axetil, cephalexin and locarbef for uncomplicated UTI. Note that some isolates may be susceptible to these agents while testing resistant to cefazolin.    -  Cefazolin: S <=2 This predicts results for oral agents cefaclor, cefdinir, cefpodoxime, cefprozil, cefuroxime axetil, cephalexin and locarbef for uncomplicated UTI. Note that some isolates may be susceptible to these agents while testing resistant to cefazolin.    -  Cefepime: S <=4    -  Cefepime: S <=2    -  Cefoxitin: S <=8    -  Cefoxitin: S <=4    -  Ceftriaxone: S <=1 Enterobacter, Citrobacter, and Serratia may develop resistance during prolonged therapy    -  Ceftriaxone: S <=1 Enterobacter, Citrobacter, and Serratia may develop resistance during prolonged therapy    -  Ciprofloxacin: I 2    -  Ciprofloxacin: S <=1    -  Ciprofloxacin: S <=0.5    -  Ertapenem: S <=1    -  Ertapenem: S <=0.5    Specimen Source: .Urine Clean Catch (Midstream)    Culture Results:   >100,000 CFU/ml Escherichia coli  >100,000 CFU/ml Klebsiella pneumoniae  >100,000 CFU/ml Coag Negative Staphylococcus  >100,000 CFU/ml Enterococcus faecalis    Organism Identification: Escherichia coli  Klebsiella pneumoniae  Coag Negative Staphylococcus  Enterococcus faecalis    Organism: Enterococcus faecalis    Organism: Coag Negative Staphylococcus    Organism: Klebsiella pneumoniae    Organism: Escherichia coli    Method Type: VICENTE    Method Type: VICENTE    Method Type: VICENTE    Method Type: VICENTE        Specimen Source: .Blood Blood-Peripheral (07-19 @ 08:31)  Culture Results:   No growth to date. (07-19 @ 08:31)    Specimen Source: .Blood Blood-Peripheral (07-19 @ 08:31)  Culture Results:   Growth in aerobic bottle: Coag Negative Staphylococcus          Legionella Antigen, Urine: Negative (07-20 @ 01:26)          Radiology:  CXR - < from: Xray Chest 1 View- PORTABLE-Routine (07.22.18 @ 09:20) >  Comparison: Chest x-ray 7/21/2018.     The mediastinal cardiac silhouette is unremarkable. Endotracheal tube   with tip at level clavicles and nivia. Nasogastric tube traced below   hemidiaphragms tip not visualized. Right IJ line with tip ends. Vena cava.    Mild obscuration of left hemidiaphragm question atelectasis and/or   effusion, unchanged.    No acute osseous finding.     IMPRESSION:    Change from prior.        Impression:   Clinically improving slowly on present ab rx with Meropenem + Vanco + Diflucan now for rx of Sepsis with Shock secondary to Lt Emphysematous Pyelonephritis with underlying Staghorn Calculus, with associated Respiratory Failure/ Multifocal PNA/ Pulmonary congestion and initial episode of Rapid AFib, s/p Cardioversion in the ER with return to [facility].  Blood Cx's  with CNS in one specimen likely represents a contaminant, but Urine Cx now finalized with >100,000 of EColi, Kleb Pneumo, CNS, and Enteroc. Faecalis - sensitive to present ab rx.  New sputum Cx pending but Gm stain noted with moderated PMN's.  LFT's and TBili trending downwards now with Hepacute panel - negative - but noted MAZIN + 1:320 with a homogeneous pattern.      Suggestions:  Will continue current ab rx and follow-up Sputum Cx results.  Follow-up temps and labs closely and will request C3, C4, and CH50 along with RF for further w/u.  Weaning in progress.

## 2018-07-23 NOTE — PROGRESS NOTE ADULT - PROBLEM SELECTOR PLAN 1
total Bilirubin better. Bilirubin out of proportion to other LFTs. No signs of hemochromatosis. MAZIN  1 : 320 but biliruben improving without any direct treatment.  - Supportive care for now ( follow up labs )

## 2018-07-23 NOTE — PROGRESS NOTE ADULT - ASSESSMENT
55yo lady who presented to the ER in respiratory distress / metabolic acidosis / hepatic encephalopathy / hepatic failure.  Intubated after a ?PEA arrest s/p DCCV for Afib with RVR.  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  HR currently 80s.  -supportive care  -replete lytes  -monitor creat/LFTs  -repeat 2D echo prior to d/c; cardiomyopathy likely 2/2 septic shock 53yo lady who presented to the ER in respiratory distress / metabolic acidosis / hepatic encephalopathy / hepatic failure.  Intubated after a ?PEA arrest s/p DCCV for Afib with RVR.  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  Currently bradycardic. Given the presence of hepatic failure, would avoid medications that are cleared by liver such as digoxin.  -supportive care  -replete lytes  -monitor creat/LFTs  -repeat 2D echo prior to d/c; cardiomyopathy likely 2/2 septic shock
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
## 2018-07-23 NOTE — PROGRESS NOTE ADULT - ASSESSMENT
HPI:  See H and P for full details.  See in  ED noted to be in respiratory distress on Bipap with metabolic acidosis.  Given 2 amps bicarb and bicarb drip for metabolic acidosis, lactate 12. Spoke to her at length, she does not doctor has not seen and MD in many years. She was AAand 0 x 4 knows who the president is in spite of her hepatic encephalopathy, Denies ETOH/drugs/Hx of HIV or liver disease. Noted to be in fulminant hepatic failure, t bili 10.7, INR 2.76, AST 84  . Denies Tylenol consumption or any toxic ingestion. Post cardioversion for Afib with RVR earlier today, reportedly lost a pulse. In ICU, made decision to intubate . premedicated with 4 mg mversed, 100 mcg fent, started on propofol at 40. Intubated by myself on first attempt with glidescope 7.5 ETT to lip 21, placed RIJ TLC on first attempt. Bedside echo with RV dilation, severe TR, septal bowing, LV EF appears normal to slighlty decreased, I got LVOT VTI of 10 possibly indicating a component of LV failure that to me seems secondary to a primary RV proscess, possibly portopulmonary HTN. Basically my question is: is this a chronic liver picture causing portopulmonary HTN and RV failure or is this a primary RV failure causing hepatic congestion. Will check formal echo, fractionate the bilis, send HIV, hepatitis and autoimmune work up. Will treat the fulminant hepatic failure with N-acetylcycteine protocol which has good evidence behind it for all forms of fulminant hepatic failure, f/u a tylenol level, add lactulose for the hyperammonemia,Aztrenaom flagyl for intrabdominal proscess in setting of PCN allergey with unknown reaction, check urine electrolytes to R/O hepatorenal syndrome, levophed to keep MAP > 65 mmhg, Check formal echo, trend cardiac enzymes. GI, renal, cards consults. C/W bicarb drip for now is making some urine. Non contrast CT H/C/A/P to evaluate for sources of sepsis.  Prognosis is guarded. She told me she has a daughter named Osvaldo in [place] who she wishes to be her HCP, thou sounds like she may be sestranged from this daughter.  CCM time initial 46 min plus another 1 hour, total 1 hr and 46 minuted included recieving patient from ER, preparation for intubation  intubation, central line placement bedside echo.  ----------------------- as Above --------------------------------------------------------------------------------------------------  Np history obtainable besides above. Intubated unresponsive. Notes reviewed. See Ct scan / sonogram. Bilirubin shows improvement over a short period of time  ---------------- Elevated Bili > transaminases/alk phos  - Seconadry to acute on chronic liver disease , VS side effects of medications. 1) supportive care  2) f/u LFTs 3) work up for underlying liver disease

## 2018-07-23 NOTE — PROGRESS NOTE ADULT - ASSESSMENT
55 yo F arrived with septic shock, with acute kidney injury acute respiratory failure, with persistent lactic acidosis in the setting of emphysematous pyelonephritis.      Neuro: Improving mental status, now off sedation. Will continue monitor  CV: Septic shock, now resolved, on midodrine.  Will continue to trend blood pressures.    Pulm: Respiratory Failure now resolved; Extubated this afternoon.    GI: Extubated today, speech and swallow at bedside, start soft diet.  MAZIN noted to be elevated, 1:320; C3, C4, and CH50.  Renal/Metabolic: Acute emphysematous pyelonephritis, with acute kidney injury now resolved; follow up urology.   ID: Emphysematous pyelo, now on Vancomycin, Fluconazole and Meropenem; followup ID.    Endo: No acute issues  Dispo: Improved shock state, and SHARYN resolved,

## 2018-07-23 NOTE — PROGRESS NOTE ADULT - SUBJECTIVE AND OBJECTIVE BOX
INTERVAL HPI/OVERNIGHT EVENTS:      53 yo F arrived with septic shock, with acute kidney injury acute respiatory failure, with persistent lactic acidosis in the setting of emphysematous pyelonephritis.      24 hour events: Weaning well on CPAP trial this AM, will extubate this morning    CENTRAL LINE: [x ] YES [ ] NO  LOCATION: [facility]  DATE INSERTED:  REMOVE: [ ] YES [ ] NO  EXPLAIN:    BARNES: [ x] YES [ ] NO    DATE INSERTED:  REMOVE:  [ ] YES [ ] NO  EXPLAIN: Emphysematous pyelonephritis    A-LINE:  [ ] YES [ ] NO  LOCATION:   DATE INSERTED:  REMOVE:  [ ] YES [ ] NO  EXPLAIN:    REVIEW OF SYSTEMS: [x] Unable to obtain because intubated on rounds, on weaning trial    ICU Vital Signs Last 24 Hrs  T(C): 36.1 (23 Jul 2018 15:22), Max: 36.8 (23 Jul 2018 11:00)  T(F): 97 (23 Jul 2018 15:22), Max: 98.3 (23 Jul 2018 11:00)  HR: 50 (23 Jul 2018 17:00) (42 - 88)  BP: 115/74 (23 Jul 2018 17:00) (83/65 - 135/95)  BP(mean): 88 (23 Jul 2018 17:00) (68 - 109)  ABP: --  ABP(mean): --  RR: 15 (23 Jul 2018 17:00) (12 - 26)  SpO2: 100% (23 Jul 2018 17:00) (99% - 100%)      ABG - ( 22 Jul 2018 08:51 )  pH, Arterial: x     pH, Blood: 7.59  /  pCO2: 23    /  pO2: 177   / HCO3: 22    / Base Excess: 1.6   /  SaO2: 100         I&O's Detail    22 Jul 2018 07:01  -  23 Jul 2018 07:00  --------------------------------------------------------  IN:    fentaNYL Infusion.: 40.5 mL    lactated ringers.: 1920 mL    propofol Infusion: 21.6 mL    Solution: 250 mL    Solution: 150 mL    Solution: 500 mL    Solution: 100 mL    Vital HN: 975 mL  Total IN: 3957.1 mL    OUT:    Indwelling Catheter - Urethral: 1000 mL    Rectal Tube: 300 mL  Total OUT: 1300 mL    Total NET: 2657.1 mL      23 Jul 2018 07:01  -  23 Jul 2018 17:28  --------------------------------------------------------  IN:    Enteral Tube Flush: 250 mL    lactated ringers.: 160 mL    Solution: 50 mL    Vital HN: 300 mL  Total IN: 760 mL    OUT:    Indwelling Catheter - Urethral: 980 mL  Total OUT: 980 mL    Total NET: -220 mL          Mode: CPAP with PS  FiO2: 30  PEEP: 5  PS: 5  MAP: 7  PIP: 10    CAPILLARY BLOOD GLUCOSE          MEDICATIONS  NEURO  Meds: fentaNYL   Infusion. 0.3 MICROgram(s)/kG/Hr (2.685 mL/Hr) IV Continuous <Continuous>    RESPIRATORY  ABG - ( 22 Jul 2018 08:51 )  pH: x     /  pCO2: 23    /  pO2: 177   / HCO3: 22    / Base Excess: 1.6   /  SaO2: 100     Lactate: x                Meds:   CARDIOVASCULAR  Meds: midodrine 10 milliGRAM(s) Oral three times a day    GI/NUTRITION  Meds: pantoprazole   Suspension 40 milliGRAM(s) Oral daily    GENITOURINARY  Meds:   HEMATOLOGIC  Meds: heparin  Injectable 5000 Unit(s) SubCutaneous every 8 hours    [x] VTE Prophylaxis  INFECTIOUS DISEASES  Meds: fluconAZOLE IVPB      fluconAZOLE IVPB 200 milliGRAM(s) IV Intermittent every 24 hours  meropenem  IVPB 1000 milliGRAM(s) IV Intermittent every 8 hours  meropenem  IVPB      vancomycin  IVPB      vancomycin  IVPB 750 milliGRAM(s) IV Intermittent every 12 hours    ENDOCRINE  CAPILLARY BLOOD GLUCOSE        Meds:   OTHER MEDICATIONS:  chlorhexidine 0.12% Liquid 15 milliLiter(s) Swish and Spit two times a day  chlorhexidine 4% Liquid 1 Application(s) Topical <User Schedule>  :    PHYSICAL EXAM:    GENERAL: NAD, diffusely edematous  NECK: Supple, No JVD, Normal thyroid, R TLC in place  CHEST/LUNG: Clear to auscultation bilaterally; intubated coarse breath sounds  HEART: Regular rate and rhythm; No murmurs, rubs, or gallops  ABDOMEN: Soft, Nontender, Nondistended; Bowel sounds present  EXTREMITIES:  Diffuse anasarca  SKIN: No rashes or lesions  NERVOUS SYSTEM:  Alert & Oriented, responding to simple commands; Motor Strength 5/5 B/L upper and lower extremities; DTRs 2+ intact and symmetric    LABS:                        8.6    10.40 )-----------( 105      ( 23 Jul 2018 04:25 )             26.6      07-23    140  |  107  |  24<H>  ----------------------------<  76  3.6   |  25  |  0.74    Ca    7.6<L>      23 Jul 2018 04:57  Phos  2.1     07-23  Mg     1.9     07-23    TPro  5.7<L>  /  Alb  1.8<L>  /  TBili  4.5<H>  /  DBili  3.72<H>  /  AST  42<H>  /  ALT  55  /  AlkPhos  97  07-23    PT/INR - ( 21 Jul 2018 20:50 )   PT: 19.7 sec;   INR: 1.79 ratio             Culture Results:   No growth to date. (07-22 @ 23:26)      RADIOLOGY & ADDITIONAL STUDIES:    CULTURES    Culture - Sputum (collected 07-22-18 @ 23:26)  Source: .Sputum Sputum  Gram Stain (prelim) (07-23-18 @ 17:02):    Moderate polymorphonuclear leukocytes per low power field    Few Squamous epithelial cells per low power field    No organisms seen per oil power field  Preliminary Report (07-23-18 @ 17:02):    No growth to date.

## 2018-07-24 LAB
ANION GAP SERPL CALC-SCNC: 11 MMOL/L — SIGNIFICANT CHANGE UP (ref 5–17)
ANION GAP SERPL CALC-SCNC: 9 MMOL/L — SIGNIFICANT CHANGE UP (ref 5–17)
APTT BLD: 30.5 SEC — SIGNIFICANT CHANGE UP (ref 27.5–37.4)
AUTO DIFF PNL BLD: NEGATIVE — SIGNIFICANT CHANGE UP
BASOPHILS # BLD AUTO: 0.04 K/UL — SIGNIFICANT CHANGE UP (ref 0–0.2)
BASOPHILS NFR BLD AUTO: 0.4 % — SIGNIFICANT CHANGE UP (ref 0–2)
BUN SERPL-MCNC: 21 MG/DL — SIGNIFICANT CHANGE UP (ref 7–23)
BUN SERPL-MCNC: 22 MG/DL — SIGNIFICANT CHANGE UP (ref 7–23)
C-ANCA SER-ACNC: NEGATIVE — SIGNIFICANT CHANGE UP
C3 SERPL-MCNC: 101 MG/DL — SIGNIFICANT CHANGE UP (ref 81–157)
C4 SERPL-MCNC: 17 MG/DL — SIGNIFICANT CHANGE UP (ref 13–39)
CALCIUM SERPL-MCNC: 7.8 MG/DL — LOW (ref 8.5–10.1)
CALCIUM SERPL-MCNC: 8.2 MG/DL — LOW (ref 8.5–10.1)
CHLORIDE SERPL-SCNC: 103 MMOL/L — SIGNIFICANT CHANGE UP (ref 96–108)
CHLORIDE SERPL-SCNC: 105 MMOL/L — SIGNIFICANT CHANGE UP (ref 96–108)
CK SERPL-CCNC: 45 U/L — SIGNIFICANT CHANGE UP (ref 26–192)
CO2 SERPL-SCNC: 25 MMOL/L — SIGNIFICANT CHANGE UP (ref 22–31)
CO2 SERPL-SCNC: 26 MMOL/L — SIGNIFICANT CHANGE UP (ref 22–31)
CREAT SERPL-MCNC: 0.7 MG/DL — SIGNIFICANT CHANGE UP (ref 0.5–1.3)
CREAT SERPL-MCNC: 0.83 MG/DL — SIGNIFICANT CHANGE UP (ref 0.5–1.3)
CRP SERPL-MCNC: 5.12 MG/DL — HIGH (ref 0–0.4)
CULTURE RESULTS: SIGNIFICANT CHANGE UP
CULTURE RESULTS: SIGNIFICANT CHANGE UP
EOSINOPHIL # BLD AUTO: 0.18 K/UL — SIGNIFICANT CHANGE UP (ref 0–0.5)
EOSINOPHIL NFR BLD AUTO: 1.7 % — SIGNIFICANT CHANGE UP (ref 0–6)
ERYTHROCYTE [SEDIMENTATION RATE] IN BLOOD: 28 MM/HR — HIGH (ref 0–20)
GLUCOSE SERPL-MCNC: 102 MG/DL — HIGH (ref 70–99)
GLUCOSE SERPL-MCNC: 74 MG/DL — SIGNIFICANT CHANGE UP (ref 70–99)
GRAM STN FLD: SIGNIFICANT CHANGE UP
HCT VFR BLD CALC: 28.4 % — LOW (ref 34.5–45)
HGB BLD-MCNC: 9 G/DL — LOW (ref 11.5–15.5)
IMM GRANULOCYTES NFR BLD AUTO: 0.7 % — SIGNIFICANT CHANGE UP (ref 0–1.5)
INR BLD: 1.55 RATIO — HIGH (ref 0.88–1.16)
LYMPHOCYTES # BLD AUTO: 29.1 % — SIGNIFICANT CHANGE UP (ref 13–44)
LYMPHOCYTES # BLD AUTO: 3.08 K/UL — SIGNIFICANT CHANGE UP (ref 1–3.3)
MAGNESIUM SERPL-MCNC: 1.8 MG/DL — SIGNIFICANT CHANGE UP (ref 1.6–2.6)
MAGNESIUM SERPL-MCNC: 1.8 MG/DL — SIGNIFICANT CHANGE UP (ref 1.6–2.6)
MCHC RBC-ENTMCNC: 28.6 PG — SIGNIFICANT CHANGE UP (ref 27–34)
MCHC RBC-ENTMCNC: 31.7 GM/DL — LOW (ref 32–36)
MCV RBC AUTO: 90.2 FL — SIGNIFICANT CHANGE UP (ref 80–100)
MONOCYTES # BLD AUTO: 0.95 K/UL — HIGH (ref 0–0.9)
MONOCYTES NFR BLD AUTO: 9 % — SIGNIFICANT CHANGE UP (ref 2–14)
NEUTROPHILS # BLD AUTO: 6.25 K/UL — SIGNIFICANT CHANGE UP (ref 1.8–7.4)
NEUTROPHILS NFR BLD AUTO: 59.1 % — SIGNIFICANT CHANGE UP (ref 43–77)
NRBC # BLD: 0 /100 WBCS — SIGNIFICANT CHANGE UP (ref 0–0)
P-ANCA SER-ACNC: NEGATIVE — SIGNIFICANT CHANGE UP
PHOSPHATE SERPL-MCNC: 2.1 MG/DL — LOW (ref 2.5–4.5)
PHOSPHATE SERPL-MCNC: 2.7 MG/DL — SIGNIFICANT CHANGE UP (ref 2.5–4.5)
PLATELET # BLD AUTO: 128 K/UL — LOW (ref 150–400)
POTASSIUM SERPL-MCNC: 3.4 MMOL/L — LOW (ref 3.5–5.3)
POTASSIUM SERPL-MCNC: 3.8 MMOL/L — SIGNIFICANT CHANGE UP (ref 3.5–5.3)
POTASSIUM SERPL-SCNC: 3.4 MMOL/L — LOW (ref 3.5–5.3)
POTASSIUM SERPL-SCNC: 3.8 MMOL/L — SIGNIFICANT CHANGE UP (ref 3.5–5.3)
PROTHROM AB SERPL-ACNC: 17.1 SEC — HIGH (ref 9.8–12.7)
RBC # BLD: 3.15 M/UL — LOW (ref 3.8–5.2)
RBC # FLD: 26 % — HIGH (ref 10.3–14.5)
RHEUMATOID FACT SERPL-ACNC: <10 IU/ML — SIGNIFICANT CHANGE UP (ref 0–13)
SODIUM SERPL-SCNC: 139 MMOL/L — SIGNIFICANT CHANGE UP (ref 135–145)
SODIUM SERPL-SCNC: 140 MMOL/L — SIGNIFICANT CHANGE UP (ref 135–145)
SPECIMEN SOURCE: SIGNIFICANT CHANGE UP
SPECIMEN SOURCE: SIGNIFICANT CHANGE UP
TOTAL HEM COMP BLD-ACNC: 55 U/ML — SIGNIFICANT CHANGE UP (ref 42–95)
TROPONIN I SERPL-MCNC: 0.04 NG/ML — SIGNIFICANT CHANGE UP (ref 0.01–0.04)
WBC # BLD: 10.57 K/UL — HIGH (ref 3.8–10.5)
WBC # FLD AUTO: 10.57 K/UL — HIGH (ref 3.8–10.5)

## 2018-07-24 PROCEDURE — 99233 SBSQ HOSP IP/OBS HIGH 50: CPT

## 2018-07-24 PROCEDURE — 71045 X-RAY EXAM CHEST 1 VIEW: CPT | Mod: 26

## 2018-07-24 PROCEDURE — 31500 INSERT EMERGENCY AIRWAY: CPT

## 2018-07-24 RX ORDER — PROPOFOL 10 MG/ML
10 INJECTION, EMULSION INTRAVENOUS
Qty: 1000 | Refills: 0 | Status: DISCONTINUED | OUTPATIENT
Start: 2018-07-24 | End: 2018-07-27

## 2018-07-24 RX ORDER — LIDOCAINE HCL 20 MG/ML
20 VIAL (ML) INJECTION ONCE
Qty: 0 | Refills: 0 | Status: DISCONTINUED | OUTPATIENT
Start: 2018-07-24 | End: 2018-08-01

## 2018-07-24 RX ORDER — MIDODRINE HYDROCHLORIDE 2.5 MG/1
10 TABLET ORAL EVERY 8 HOURS
Qty: 0 | Refills: 0 | Status: DISCONTINUED | OUTPATIENT
Start: 2018-07-24 | End: 2018-07-25

## 2018-07-24 RX ORDER — NOREPINEPHRINE BITARTRATE/D5W 8 MG/250ML
0.05 PLASTIC BAG, INJECTION (ML) INTRAVENOUS
Qty: 8 | Refills: 0 | Status: DISCONTINUED | OUTPATIENT
Start: 2018-07-24 | End: 2018-07-27

## 2018-07-24 RX ORDER — FUROSEMIDE 40 MG
40 TABLET ORAL ONCE
Qty: 0 | Refills: 0 | Status: COMPLETED | OUTPATIENT
Start: 2018-07-24 | End: 2018-07-24

## 2018-07-24 RX ORDER — POTASSIUM CHLORIDE 20 MEQ
40 PACKET (EA) ORAL ONCE
Qty: 0 | Refills: 0 | Status: COMPLETED | OUTPATIENT
Start: 2018-07-24 | End: 2018-07-24

## 2018-07-24 RX ADMIN — MEROPENEM 100 MILLIGRAM(S): 1 INJECTION INTRAVENOUS at 14:30

## 2018-07-24 RX ADMIN — Medication 250 MILLIGRAM(S): at 06:29

## 2018-07-24 RX ADMIN — Medication 250 MILLIGRAM(S): at 17:07

## 2018-07-24 RX ADMIN — Medication 40 MILLIEQUIVALENT(S): at 06:35

## 2018-07-24 RX ADMIN — MIDODRINE HYDROCHLORIDE 10 MILLIGRAM(S): 2.5 TABLET ORAL at 21:26

## 2018-07-24 RX ADMIN — MEROPENEM 100 MILLIGRAM(S): 1 INJECTION INTRAVENOUS at 21:26

## 2018-07-24 RX ADMIN — Medication 40 MILLIGRAM(S): at 22:43

## 2018-07-24 RX ADMIN — MIDODRINE HYDROCHLORIDE 10 MILLIGRAM(S): 2.5 TABLET ORAL at 06:30

## 2018-07-24 RX ADMIN — MEROPENEM 100 MILLIGRAM(S): 1 INJECTION INTRAVENOUS at 06:29

## 2018-07-24 RX ADMIN — CHLORHEXIDINE GLUCONATE 1 APPLICATION(S): 213 SOLUTION TOPICAL at 10:36

## 2018-07-24 RX ADMIN — PANTOPRAZOLE SODIUM 40 MILLIGRAM(S): 20 TABLET, DELAYED RELEASE ORAL at 14:09

## 2018-07-24 RX ADMIN — FLUCONAZOLE 100 MILLIGRAM(S): 150 TABLET ORAL at 17:07

## 2018-07-24 NOTE — PROGRESS NOTE ADULT - SUBJECTIVE AND OBJECTIVE BOX
INTERVAL HPI/OVERNIGHT EVENTS:      53 yo F arrived with septic shock, with acute kidney injury acute respiatory failure, with persistent lactic acidosis in the setting of emphysematous pyelonephritis.  Extubated yesterday on 7/23/18.    24 hour events: Awaiting midline placement.    CENTRAL LINE: [ x] YES [ ] NO  LOCATION: [facility]   DATE INSERTED: 7/19/18  REMOVE: [x ] YES [ ] NO  EXPLAIN: Awaiting midline placement    BARNES: [x ] YES [ ] NO    DATE INSERTED: 7/19/18  REMOVE:  [ ] YES [ ] NO  EXPLAIN:    A-LINE:  [ ] YES [ ] NO  LOCATION:   DATE INSERTED:  REMOVE:  [ ] YES [ ] NO  EXPLAIN:    REVIEW OF SYSTEMS:   CONSTITUTIONAL: No fever, weight loss, or fatigue  EYES: No eye pain, visual disturbances, or discharge  RESPIRATORY: No cough, wheezing, chills or hemoptysis; No shortness of breath  CARDIOVASCULAR: No chest pain, palpitations, dizziness, or leg swelling  GASTROINTESTINAL: No abdominal or epigastric pain. No nausea, vomiting, or hematemesis; No diarrhea or constipation. No melena or hematochezia.  NEUROLOGICAL: No headaches, memory loss, loss of strength, numbness, or tremors      ICU Vital Signs Last 24 Hrs  T(C): 36.6 (24 Jul 2018 08:00), Max: 36.6 (24 Jul 2018 08:00)  T(F): 97.8 (24 Jul 2018 08:00), Max: 97.8 (24 Jul 2018 08:00)  HR: 83 (24 Jul 2018 11:00) (50 - 92)  BP: 125/78 (24 Jul 2018 11:00) (101/69 - 139/87)  BP(mean): 93 (24 Jul 2018 11:00) (78 - 116)  ABP: --  ABP(mean): --  RR: 23 (24 Jul 2018 11:00) (10 - 31)  SpO2: 99% (24 Jul 2018 11:00) (95% - 100%)    I&O's Detail    23 Jul 2018 07:01  -  24 Jul 2018 07:00  --------------------------------------------------------  IN:    Enteral Tube Flush: 250 mL    lactated ringers.: 160 mL    Oral Fluid: 270 mL    Solution: 100 mL    Solution: 100 mL    Solution: 250 mL    Vital HN: 300 mL  Total IN: 1430 mL    OUT:    Indwelling Catheter - Urethral: 2340 mL    Rectal Tube: 100 mL  Total OUT: 2440 mL    Total NET: -1010 mL      24 Jul 2018 07:01  -  24 Jul 2018 14:34  --------------------------------------------------------  IN:    Oral Fluid: 120 mL  Total IN: 120 mL    OUT:    Indwelling Catheter - Urethral: 85 mL  Total OUT: 85 mL    Total NET: 35 mL            CAPILLARY BLOOD GLUCOSE          MEDICATIONS  NEURO  Meds:   RESPIRATORY    Meds:   CARDIOVASCULAR  Meds: midodrine 10 milliGRAM(s) Oral three times a day    GI/NUTRITION  Meds: pantoprazole   Suspension 40 milliGRAM(s) Oral daily    GENITOURINARY  Meds:   HEMATOLOGIC  Meds: heparin  Injectable 5000 Unit(s) SubCutaneous every 8 hours    [x] VTE Prophylaxis  INFECTIOUS DISEASES  Meds: fluconAZOLE IVPB      fluconAZOLE IVPB 200 milliGRAM(s) IV Intermittent every 24 hours  meropenem  IVPB 1000 milliGRAM(s) IV Intermittent every 8 hours  meropenem  IVPB      vancomycin  IVPB      vancomycin  IVPB 750 milliGRAM(s) IV Intermittent every 12 hours    ENDOCRINE  CAPILLARY BLOOD GLUCOSE        Meds:   OTHER MEDICATIONS:  chlorhexidine 4% Liquid 1 Application(s) Topical <User Schedule>  lidocaine 1% (Preservative-free) Injectable 20 milliLiter(s) Local Injection once  :    PHYSICAL EXAM:    GENERAL: NAD, diffusely edematous  NECK: Supple, No JVD, Normal thyroid, R TLC in place  CHEST/LUNG: Clear to auscultation bilaterally; intubated coarse breath sounds  HEART: Regular rate and rhythm; No murmurs, rubs, or gallops  ABDOMEN: Soft, Nontender, Nondistended; Bowel sounds present  EXTREMITIES:  Diffuse anasarca  SKIN: No rashes or lesions  NERVOUS SYSTEM:  Alert & Oriented, responding to simple commands; Motor Strength 5/5 B/L upper and lower extremities; DTRs 2+ intact and symmetric    LABS:                        9.0    10.57 )-----------( 128      ( 24 Jul 2018 04:14 )             28.4      07-24    140  |  105  |  22  ----------------------------<  74  3.4<L>   |  26  |  0.70    Ca    7.8<L>      24 Jul 2018 04:14  Phos  2.7     07-24  Mg     1.8     07-24    TPro  5.7<L>  /  Alb  1.8<L>  /  TBili  4.5<H>  /  DBili  3.72<H>  /  AST  42<H>  /  ALT  55  /  AlkPhos  97  07-23        Culture Results:   No growth to date. (07-22 @ 23:26)      RADIOLOGY & ADDITIONAL STUDIES:    CULTURES

## 2018-07-24 NOTE — PROGRESS NOTE ADULT - ASSESSMENT
HPI:  See H and P for full details.  See in  ED noted to be in respiratory distress on Bipap with metabolic acidosis.  Given 2 amps bicarb and bicarb drip for metabolic acidosis, lactate 12. Spoke to her at length, she does not doctor has not seen and MD in many years. She was AAand 0 x 4 knows who the president is in spite of her hepatic encephalopathy, Denies ETOH/drugs/Hx of HIV or liver disease. Noted to be in fulminant hepatic failure, t bili 10.7, INR 2.76, AST 84  . Denies Tylenol consumption or any toxic ingestion. Post cardioversion for Afib with RVR earlier today, reportedly lost a pulse. In ICU, made decision to intubate . premedicated with 4 mg mversed, 100 mcg fent, started on propofol at 40. Intubated by myself on first attempt with glidescope 7.5 ETT to lip 21, placed RIJ TLC on first attempt. Bedside echo with RV dilation, severe TR, septal bowing, LV EF appears normal to slighlty decreased, I got LVOT VTI of 10 possibly indicating a component of LV failure that to me seems secondary to a primary RV proscess, possibly portopulmonary HTN. Basically my question is: is this a chronic liver picture causing portopulmonary HTN and RV failure or is this a primary RV failure causing hepatic congestion. Will check formal echo, fractionate the bilis, send HIV, hepatitis and autoimmune work up. Will treat the fulminant hepatic failure with N-acetylcycteine protocol which has good evidence behind it for all forms of fulminant hepatic failure, f/u a tylenol level, add lactulose for the hyperammonemia,Aztrenaom flagyl for intrabdominal proscess in setting of PCN allergey with unknown reaction, check urine electrolytes to R/O hepatorenal syndrome, levophed to keep MAP > 65 mmhg, Check formal echo, trend cardiac enzymes. GI, renal, cards consults. C/W bicarb drip for now is making some urine. Non contrast CT H/C/A/P to evaluate for sources of sepsis.  Prognosis is guarded. She told me she has a daughter named Osvaldo in [place] who she wishes to be her HCP, thou sounds like she may be sestranged from this daughter.  CCM time initial 46 min plus another 1 hour, total 1 hr and 46 minuted included recieving patient from ER, preparation for intubation  intubation, central line placement bedside echo.  ----------------------- as Above --------------------------------------------------------------------------------------------------  Np history obtainable besides above. Intubated unresponsive. Notes reviewed. See Ct scan / sonogram. Bilirubin shows improvement over a short period of time  ---------------- Elevated Bili > transaminases/alk phos  - [place] to acute on chronic liver disease , VS side effects of medications. 1) supportive care  2) f/u LFTs 3) work up for underlying liver disease
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
## 2018-07-24 NOTE — PROGRESS NOTE ADULT - ASSESSMENT
53 yo F arrived with septic shock, with acute kidney injury acute respiratory failure, with persistent lactic acidosis in the setting of emphysematous pyelonephritis.      Neuro: Improving mental status, now off sedation. Will continue monitor  CV: Septic shock, now resolved, on midodrine.  Will continue to trend blood pressures.    Pulm: Respiratory Failure now resolved; Extubated this afternoon.    GI: Extubated today, speech and swallow at bedside, start soft diet.  MAZIN noted to be elevated, 1:320; Awaiting results of C3, C4, and CH50.  Renal/Metabolic: Acute emphysematous pyelonephritis, with acute kidney injury now resolved; follow up urology.   ID: Emphysematous pyelo, now on Vancomycin, Fluconazole and Meropenem; followup ID for course.  D/C TLC, place midline for ongoing antibiotics.    Endo: No acute issues  Dispo: Improved shock state, and SHARYN resolved

## 2018-07-24 NOTE — PROGRESS NOTE ADULT - PROBLEM SELECTOR PLAN 1
No LFTs.  today.  No signs of hemochromatosis. MAZIN  1 : 320 but biliruben improving without any direct treatment.  - Supportive care for now ( follow up labs ordered )

## 2018-07-24 NOTE — PROGRESS NOTE ADULT - SUBJECTIVE AND OBJECTIVE BOX
55yo Female who presented to the ER in respiratory distress / metabolic acidosis / hepatic encephalopathy / hepatic failure.  Intubated after a ?PEA arrest s/p DCCV for Afib with RVR.  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  Echo: LVEF 30%, RV enlargement, mod MR    O/N: vitals stable off dig; on midodrine    MEDICATIONS  (STANDING):  chlorhexidine 4% Liquid 1 Application(s) Topical <User Schedule>  fluconAZOLE IVPB      fluconAZOLE IVPB 200 milliGRAM(s) IV Intermittent every 24 hours  heparin  Injectable 5000 Unit(s) SubCutaneous every 8 hours  lidocaine 1% (Preservative-free) Injectable 20 milliLiter(s) Local Injection once  meropenem  IVPB 1000 milliGRAM(s) IV Intermittent every 8 hours  meropenem  IVPB      midodrine 10 milliGRAM(s) Oral three times a day  pantoprazole   Suspension 40 milliGRAM(s) Oral daily  vancomycin  IVPB      vancomycin  IVPB 750 milliGRAM(s) IV Intermittent every 12 hours    MEDICATIONS  (PRN):        PHYSICAL EXAMINATION:  -----------------------------  Vital Signs Last 24 Hrs  T(C): 36.6 (24 Jul 2018 08:00), Max: 36.6 (24 Jul 2018 08:00)  T(F): 97.8 (24 Jul 2018 08:00), Max: 97.8 (24 Jul 2018 08:00)  HR: 83 (24 Jul 2018 11:00) (50 - 92)  BP: 125/78 (24 Jul 2018 11:00) (101/69 - 139/87)  BP(mean): 93 (24 Jul 2018 11:00) (78 - 116)  RR: 23 (24 Jul 2018 11:00) (10 - 31)  SpO2: 99% (24 Jul 2018 11:00) (95% - 100%)    Constitutional: intubated  Eyes: the conjunctiva exhibited no abnormalities and the eyelids demonstrated no xanthelasmas.   HEENT: normal oral mucosa, no oral pallor and no oral cyanosis.   Neck: normal jugular venous A waves present, normal jugular venous V waves present and no jugular venous mcknight A waves.   Pulmonary: no respiratory distress on vent support, normal respiratory rhythm and effort, no accessory muscle use and lungs were clear  Cardiovascular: irreg irreg; 2/6 SM  Abdomen: soft, non-tender, no hepato-splenomegaly  Musculoskeletal: the gait could not be assessed.  Extremities: no clubbing of the fingernails, no localized cyanosis  Skin: normal skin color and pigmentation, no rash, no venous stasis    LABS:   --------                                   9.0    10.57 )-----------( 128      ( 24 Jul 2018 04:14 )             28.4   07-24    140  |  105  |  22  ----------------------------<  74  3.4<L>   |  26  |  0.70    Ca    7.8<L>      24 Jul 2018 04:14  Phos  2.7     07-24  Mg     1.8     07-24    TPro  5.7<L>  /  Alb  1.8<L>  /  TBili  4.5<H>  /  DBili  3.72<H>  /  AST  42<H>  /  ALT  55  /  AlkPhos  97  07-23            INTERPRETATION:  CT of the chest, abdomen, and pelvis without contrast    Indication: Shock. Respiratory failure. Jaundice. Elevated bilirubin.    Technique: Axial multidetector CT images of the chest, abdomen, and   pelvis are acquired without IV or oral contrast.    Comparison: None.    Findings:    Chest: Mild bilateral pleural effusions. Small right pericardial   effusion. Cardiomegaly. No aortic aneurysm. Allowing for the noncontrast   technique, there is no grossly enlarged mediastinal, hilar, or axillary   lymph node.    ET tube terminates above the nivia. NG tube terminates in the stomach.    Small densities in the right mainstem bronchus may represent mucous   material.    Mild emphysematous changes in the right lung, suggestive of COPD. Small   bilateral atelectasis. Mild hazy groundglass densities in both lungs may   represent pulmonary edema or pneumonia. No evidence for pneumothorax.    Abdomen: Evaluation of the abdomen and pelvis is limited by lack of IV   and oral contrast. Allowing for noncontrast technique, the liver,   pancreas, spleen, and the right kidney appear grossly unremarkable. There   is a staghorn calculus in the left kidney. Apparent air in the left renal   calyces. No hydronephrosis.    Nonspecific adrenal thickening bilaterally.    The gallbladder appears contracted. Pericholecystic fluid versus   gallbladder wall edema. The common bile duct is not visualized on this   limited examination. If clinically indicated, abdominal ultrasound may be   pursued for further evaluation.    No bowel obstruction, or grossly thickened bowel wall. The appendix is   not identified. No evidence free air, or enlarged lymph node. Mild to   moderate ascites in the abdomen and pelvis.    Pelvis: There is a Lyons catheter in the urinary bladder which contains   air. The urinary bladder is underdistended, rendering evaluation   difficult. The bowel structures appear grossly unremarkable. No grossly   enlarged pelvic lymph node.    There is diffuse body wall edema in the chest, abdomen, and pelvis.    Impression: Anasarca.    Mild bilateral pleural effusions. Small right pericardial effusion.    Small densities in the right mainstem bronchus may represent mucous   material. Follow-up chest CT may be pursued in 4-6 weeks to ensure   clearance.    Mild hazy groundglass densities in both lungs may represent pulmonary   edema or pneumonia. Clinical correlation is recommended.    The gallbladder appears contracted. Pericholecystic fluid versus   gallbladder wall edema. The common bile duct is not visualized on this   limited examination. If clinically indicated, abdominal ultrasound may be   pursued for further evaluation. Alternatively, abdominal MR without and   with IV contrast including MRCP may be pursued.    No bowel obstruction, or grossly thickened bowel wall. The appendix is   not identified.    Mild to moderate ascites in the abdomen and pelvis.    Lyons catheter in the urinary bladder. Associated air in the urinary   bladder. Staghorn calculus in the left kidney. Apparent air in the left   renal calyces may be due to reflux from the urinary bladder or may   represent emphysematous pyelitis. Clinical correlation is recommended.    < end of copied text >    < from: US Duplex Venous Lower Ext Complete, Bilateral (07.19.18 @ 16:51) >  IMPRESSION:     No evidence of bilateral lower extremity deep venous thrombosis.    < end of copied text >        < from: TTE Echo Doppler w/o Cont (07.19.18 @ 19:57) >   1. Left ventricular ejection fraction, by visual estimation, is 30 to   35%.   2. There is no left ventricular hypertrophy.   3. Biatrial enlargement.   4. Right ventricular dilatation.   5. Moderate mitral valve regurgitation.   6. Mild tricuspid regurgitation.   7. Pulmonic valve regurgitation.   8. Moderately to severely decreased global left ventricular systolic   function.      < end of copied text >

## 2018-07-24 NOTE — PROGRESS NOTE ADULT - ASSESSMENT
53yo lady who presented to the ER in respiratory distress / metabolic acidosis / hepatic encephalopathy / hepatic failure.  Intubated after a ?PEA arrest s/p DCCV for Afib with RVR.  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

## 2018-07-24 NOTE — PROGRESS NOTE ADULT - SUBJECTIVE AND OBJECTIVE BOX
Patient seen and examined bedside in CCU.  Awake and extubated. C/o pain in her back side    T(F): 97.6 (07-24-18 @ 05:00), Max: 98.3 (07-23-18 @ 11:00)  HR: 73 (07-24-18 @ 07:19) (50 - 87)  BP: 125/59 (07-24-18 @ 07:00) (98/70 - 135/95)  RR: 28 (07-24-18 @ 07:19) (10 - 31)  SpO2: 99% (07-24-18 @ 07:19) (95% - 100%)  Wt(kg): --  CAPILLARY BLOOD GLUCOSE      PHYSICAL EXAM:  General: NAD, WDWN  Neuro:  Alert & responsive  HEENT: NCAT, EOMI, conjunctiva clear  Neck: right CVP triple lumen cath in situ  CV: +S1+S2 regular rate and rhythm  Lung: clear to auscultation bilaterally, respirations nonlabored  Abdomen: soft, NTND. Normoactive BS  Extremities: no pedal edema or calf tenderness noted   : nova catheter intact draining yellow urine, output 2340cc  Rectal: flexiseal intact, output 100cc    LABS:                        9.0    10.57 )-----------( 128      ( 24 Jul 2018 04:14 )             28.4     07-24    140  |  105  |  22  ----------------------------<  74  3.4<L>   |  26  |  0.70    Ca    7.8<L>      24 Jul 2018 04:14  Phos  2.7     07-24  Mg     1.8     07-24    TPro  5.7<L>  /  Alb  1.8<L>  /  TBili  4.5<H>  /  DBili  3.72<H>  /  AST  42<H>  /  ALT  55  /  AlkPhos  97  07-23    Culture Results:   No growth to date. (07-22 @ 23:26)  Culture Results:   >100,000 CFU/ml Escherichia coli  >100,000 CFU/ml Klebsiella pneumoniae  >100,000 CFU/ml Coag Negative Staphylococcus  >100,000 CFU/ml Enterococcus faecalis (07-19 @ 12:46)  Culture Results:   No growth to date. (07-19 @ 08:31)  Culture Results:   Growth in aerobic bottle: Coag Negative Staphylococcus  Single set isolate, possible contaminant. Contact  Microbiology if susceptibility testing clinically  indicated.  "Due to technical problems, Proteus sp. will Not be reported as part of  the BCID panel until further notice"  ***Blood Panel PCR results on this specimen are available  approximately 3 hours after the Gram stain result.***  Gram stain, PCR, and/or culture results may not always  correspond due to difference in methodologies.  ************************************************************  This PCR assay was performed using [employer].  The following targets are tested for: Enterococcus,  vancomycin resistant enterococci, Listeria monocytogenes,  coagulase negative staphylococci, S. aureus,  methicillin resistant S. aureus, Streptococcus agalactiae  (Group B), S. pneumoniae, S. pyogenes (Group A),  Acinetobacter baumannii, Enterobacter cloacae, E. coli,  Klebsiella oxytoca, K. pneumoniae, Proteus sp.,  Serratia marcescens, Haemophilus influenzae,  Neisseria meningitidis, Pseudomonas aeruginosa, Candida  albicans, C. glabrata, C krusei, C parapsilosis,  C. tropicalis and the KPC resistance gene. (07-19 @ 08:31)    Assessment: 53yo Female admitted with septic shock due to Left emphysematous pyelonephritis, respiratory failure, found with staghorn calculous of Left kidney on CT. Urine culture 7/19 with + e coli, klebsiella, CNS and enterococcus faecalis.     Plan:  Continue primary medical management per ICU team  continue Antibiotics per ID  continue nova, monitor urine output  discussed with Dr Frances. No surgical intervention planned at present time. Recommend KUB for Follow up.

## 2018-07-24 NOTE — CHART NOTE - NSCHARTNOTEFT_GEN_A_CORE
Pt medically stable for transfer to medical floor.     53 yo F with no known PMH brought in to ER and was found to be in afib RVR with low BP and was subsequently cardioverted to sinus rhythm in the ED.  Pt was also hypoglycemic with a FS of 26, given dextrose with appropriate response.  ICU consulted for lactic acidosis and respiratory distress.  Pt tried on bipap but  ultimately intubated and put on pressors for septic shock.     CT head negative for acute pathology.  CT chest/Abd revealed air in the left renal calyces may be due to reflux from the urinary bladder or may represent emphysematous pyelitis."  called for consult but no intervention necessary at this time.   Will repeat CT scan in a few days.  Urine cultures growing multiple organisms, ID to continue to follow.   GI called for hepatic failure.  LFTs downtrending, will continue supportive care at this time. Pt medically stable for transfer to medical floor.   Signed out to Dr. Duffy    53 yo F with no known PMH brought in to ER and was found to be in afib RVR with low BP and was subsequently cardioverted to sinus rhythm in the ED.  Pt was also hypoglycemic with a FS of 26, given dextrose with appropriate response.  ICU consulted for lactic acidosis and respiratory distress.  Pt tried on bipap but  ultimately intubated and put on pressors for septic shock.     CT head negative for acute pathology.  CT chest/Abd revealed air in the left renal calyces may be due to reflux from the urinary bladder or may represent emphysematous pyelitis."  called for consult but no intervention necessary at this time.   Will repeat CT scan in a few days.  Urine cultures growing multiple organisms, ID to continue to follow.   GI called for hepatic failure.  LFTs downtrending, will continue supportive care at this time.

## 2018-07-24 NOTE — PROCEDURE NOTE - ADDITIONAL PROCEDURE DETAILS
pt s/p left arm midline placement inserted via basilic vein.  4fr x 20cm.  Pt tolerated procedure well  -midline can be accessed
Significant oral secretions seen while intubating and suctioned from ET tube following intubation

## 2018-07-25 LAB
ALBUMIN SERPL ELPH-MCNC: 1.9 G/DL — LOW (ref 3.3–5)
ALP SERPL-CCNC: 100 U/L — SIGNIFICANT CHANGE UP (ref 40–120)
ALT FLD-CCNC: 45 U/L — SIGNIFICANT CHANGE UP (ref 12–78)
ANION GAP SERPL CALC-SCNC: 10 MMOL/L — SIGNIFICANT CHANGE UP (ref 5–17)
ANION GAP SERPL CALC-SCNC: 7 MMOL/L — SIGNIFICANT CHANGE UP (ref 5–17)
AST SERPL-CCNC: 40 U/L — HIGH (ref 15–37)
BASE EXCESS BLDA CALC-SCNC: 2.7 MMOL/L — HIGH (ref -2–2)
BILIRUB DIRECT SERPL-MCNC: 3.63 MG/DL — HIGH (ref 0.05–0.2)
BILIRUB INDIRECT FLD-MCNC: 0.8 MG/DL — SIGNIFICANT CHANGE UP (ref 0.2–1)
BILIRUB SERPL-MCNC: 4.4 MG/DL — HIGH (ref 0.2–1.2)
BLOOD GAS COMMENTS: SIGNIFICANT CHANGE UP
BLOOD GAS COMMENTS: SIGNIFICANT CHANGE UP
BLOOD GAS SOURCE: SIGNIFICANT CHANGE UP
BUN SERPL-MCNC: 15 MG/DL — SIGNIFICANT CHANGE UP (ref 7–23)
BUN SERPL-MCNC: 21 MG/DL — SIGNIFICANT CHANGE UP (ref 7–23)
CALCIUM SERPL-MCNC: 8 MG/DL — LOW (ref 8.5–10.1)
CALCIUM SERPL-MCNC: 8 MG/DL — LOW (ref 8.5–10.1)
CHLORIDE SERPL-SCNC: 104 MMOL/L — SIGNIFICANT CHANGE UP (ref 96–108)
CHLORIDE SERPL-SCNC: 106 MMOL/L — SIGNIFICANT CHANGE UP (ref 96–108)
CK MB BLD-MCNC: <3.4 % — SIGNIFICANT CHANGE UP (ref 0–3.5)
CK MB CFR SERPL CALC: <1 NG/ML — SIGNIFICANT CHANGE UP (ref 0.5–3.6)
CK SERPL-CCNC: 29 U/L — SIGNIFICANT CHANGE UP (ref 26–192)
CK SERPL-CCNC: 41 U/L — SIGNIFICANT CHANGE UP (ref 26–192)
CO2 SERPL-SCNC: 27 MMOL/L — SIGNIFICANT CHANGE UP (ref 22–31)
CO2 SERPL-SCNC: 30 MMOL/L — SIGNIFICANT CHANGE UP (ref 22–31)
CREAT SERPL-MCNC: 0.6 MG/DL — SIGNIFICANT CHANGE UP (ref 0.5–1.3)
CREAT SERPL-MCNC: 0.73 MG/DL — SIGNIFICANT CHANGE UP (ref 0.5–1.3)
GLUCOSE SERPL-MCNC: 80 MG/DL — SIGNIFICANT CHANGE UP (ref 70–99)
GLUCOSE SERPL-MCNC: 85 MG/DL — SIGNIFICANT CHANGE UP (ref 70–99)
GRAM STN FLD: SIGNIFICANT CHANGE UP
HCO3 BLDA-SCNC: 27 MMOL/L — SIGNIFICANT CHANGE UP (ref 21–29)
HCT VFR BLD CALC: 32.4 % — LOW (ref 34.5–45)
HGB BLD-MCNC: 10.4 G/DL — LOW (ref 11.5–15.5)
HOROWITZ INDEX BLDA+IHG-RTO: 21 — SIGNIFICANT CHANGE UP
MAGNESIUM SERPL-MCNC: 1.8 MG/DL — SIGNIFICANT CHANGE UP (ref 1.6–2.6)
MCHC RBC-ENTMCNC: 29 PG — SIGNIFICANT CHANGE UP (ref 27–34)
MCHC RBC-ENTMCNC: 32.1 GM/DL — SIGNIFICANT CHANGE UP (ref 32–36)
MCV RBC AUTO: 90.3 FL — SIGNIFICANT CHANGE UP (ref 80–100)
NRBC # BLD: 0 /100 WBCS — SIGNIFICANT CHANGE UP (ref 0–0)
PCO2 BLDA: 42 MMHG — SIGNIFICANT CHANGE UP (ref 32–46)
PH BLD: 7.42 — SIGNIFICANT CHANGE UP (ref 7.35–7.45)
PHOSPHATE SERPL-MCNC: 2.5 MG/DL — SIGNIFICANT CHANGE UP (ref 2.5–4.5)
PLATELET # BLD AUTO: 160 K/UL — SIGNIFICANT CHANGE UP (ref 150–400)
PO2 BLDA: 76 MMHG — SIGNIFICANT CHANGE UP (ref 74–108)
POTASSIUM SERPL-MCNC: 3.6 MMOL/L — SIGNIFICANT CHANGE UP (ref 3.5–5.3)
POTASSIUM SERPL-MCNC: 3.9 MMOL/L — SIGNIFICANT CHANGE UP (ref 3.5–5.3)
POTASSIUM SERPL-SCNC: 3.6 MMOL/L — SIGNIFICANT CHANGE UP (ref 3.5–5.3)
POTASSIUM SERPL-SCNC: 3.9 MMOL/L — SIGNIFICANT CHANGE UP (ref 3.5–5.3)
PROT SERPL-MCNC: 6.7 GM/DL — SIGNIFICANT CHANGE UP (ref 6–8.3)
RBC # BLD: 3.59 M/UL — LOW (ref 3.8–5.2)
RBC # FLD: 25.1 % — HIGH (ref 10.3–14.5)
SAO2 % BLDA: 94 % — SIGNIFICANT CHANGE UP (ref 92–96)
SODIUM SERPL-SCNC: 141 MMOL/L — SIGNIFICANT CHANGE UP (ref 135–145)
SODIUM SERPL-SCNC: 143 MMOL/L — SIGNIFICANT CHANGE UP (ref 135–145)
SPECIMEN SOURCE: SIGNIFICANT CHANGE UP
TROPONIN I SERPL-MCNC: 0.04 NG/ML — SIGNIFICANT CHANGE UP (ref 0.01–0.04)
TROPONIN I SERPL-MCNC: 0.06 NG/ML — HIGH (ref 0.01–0.04)
WBC # BLD: 15.29 K/UL — HIGH (ref 3.8–10.5)
WBC # FLD AUTO: 15.29 K/UL — HIGH (ref 3.8–10.5)

## 2018-07-25 PROCEDURE — 93010 ELECTROCARDIOGRAM REPORT: CPT

## 2018-07-25 PROCEDURE — 99232 SBSQ HOSP IP/OBS MODERATE 35: CPT

## 2018-07-25 PROCEDURE — 99291 CRITICAL CARE FIRST HOUR: CPT

## 2018-07-25 PROCEDURE — 71045 X-RAY EXAM CHEST 1 VIEW: CPT | Mod: 26

## 2018-07-25 PROCEDURE — 71045 X-RAY EXAM CHEST 1 VIEW: CPT | Mod: 26,77

## 2018-07-25 RX ORDER — MIDODRINE HYDROCHLORIDE 2.5 MG/1
20 TABLET ORAL EVERY 8 HOURS
Qty: 0 | Refills: 0 | Status: DISCONTINUED | OUTPATIENT
Start: 2018-07-25 | End: 2018-07-25

## 2018-07-25 RX ORDER — PANTOPRAZOLE SODIUM 20 MG/1
40 TABLET, DELAYED RELEASE ORAL DAILY
Qty: 0 | Refills: 0 | Status: DISCONTINUED | OUTPATIENT
Start: 2018-07-25 | End: 2018-07-30

## 2018-07-25 RX ORDER — CHLORHEXIDINE GLUCONATE 213 G/1000ML
15 SOLUTION TOPICAL
Qty: 0 | Refills: 0 | Status: DISCONTINUED | OUTPATIENT
Start: 2018-07-25 | End: 2018-07-30

## 2018-07-25 RX ORDER — DEXMEDETOMIDINE HYDROCHLORIDE IN 0.9% SODIUM CHLORIDE 4 UG/ML
0.5 INJECTION INTRAVENOUS
Qty: 200 | Refills: 0 | Status: DISCONTINUED | OUTPATIENT
Start: 2018-07-25 | End: 2018-07-25

## 2018-07-25 RX ORDER — FUROSEMIDE 40 MG
40 TABLET ORAL ONCE
Qty: 0 | Refills: 0 | Status: COMPLETED | OUTPATIENT
Start: 2018-07-25 | End: 2018-07-25

## 2018-07-25 RX ORDER — PANTOPRAZOLE SODIUM 20 MG/1
40 TABLET, DELAYED RELEASE ORAL DAILY
Qty: 0 | Refills: 0 | Status: DISCONTINUED | OUTPATIENT
Start: 2018-07-25 | End: 2018-07-25

## 2018-07-25 RX ORDER — FENTANYL CITRATE 50 UG/ML
0.5 INJECTION INTRAVENOUS
Qty: 2500 | Refills: 0 | Status: DISCONTINUED | OUTPATIENT
Start: 2018-07-25 | End: 2018-07-31

## 2018-07-25 RX ADMIN — MEROPENEM 100 MILLIGRAM(S): 1 INJECTION INTRAVENOUS at 21:10

## 2018-07-25 RX ADMIN — CHLORHEXIDINE GLUCONATE 15 MILLILITER(S): 213 SOLUTION TOPICAL at 17:22

## 2018-07-25 RX ADMIN — Medication 40 MILLIGRAM(S): at 06:52

## 2018-07-25 RX ADMIN — PROPOFOL 5.37 MICROGRAM(S)/KG/MIN: 10 INJECTION, EMULSION INTRAVENOUS at 00:00

## 2018-07-25 RX ADMIN — PROPOFOL 5.37 MICROGRAM(S)/KG/MIN: 10 INJECTION, EMULSION INTRAVENOUS at 11:26

## 2018-07-25 RX ADMIN — PROPOFOL 5.37 MICROGRAM(S)/KG/MIN: 10 INJECTION, EMULSION INTRAVENOUS at 17:21

## 2018-07-25 RX ADMIN — CHLORHEXIDINE GLUCONATE 15 MILLILITER(S): 213 SOLUTION TOPICAL at 06:08

## 2018-07-25 RX ADMIN — Medication 8.39 MICROGRAM(S)/KG/MIN: at 02:00

## 2018-07-25 RX ADMIN — HEPARIN SODIUM 5000 UNIT(S): 5000 INJECTION INTRAVENOUS; SUBCUTANEOUS at 14:13

## 2018-07-25 RX ADMIN — PANTOPRAZOLE SODIUM 40 MILLIGRAM(S): 20 TABLET, DELAYED RELEASE ORAL at 11:19

## 2018-07-25 RX ADMIN — Medication 250 MILLIGRAM(S): at 17:22

## 2018-07-25 RX ADMIN — MIDODRINE HYDROCHLORIDE 20 MILLIGRAM(S): 2.5 TABLET ORAL at 14:13

## 2018-07-25 RX ADMIN — MEROPENEM 100 MILLIGRAM(S): 1 INJECTION INTRAVENOUS at 06:08

## 2018-07-25 RX ADMIN — MIDODRINE HYDROCHLORIDE 10 MILLIGRAM(S): 2.5 TABLET ORAL at 06:08

## 2018-07-25 RX ADMIN — HEPARIN SODIUM 5000 UNIT(S): 5000 INJECTION INTRAVENOUS; SUBCUTANEOUS at 21:09

## 2018-07-25 RX ADMIN — FLUCONAZOLE 100 MILLIGRAM(S): 150 TABLET ORAL at 17:22

## 2018-07-25 RX ADMIN — CHLORHEXIDINE GLUCONATE 1 APPLICATION(S): 213 SOLUTION TOPICAL at 05:35

## 2018-07-25 RX ADMIN — MEROPENEM 100 MILLIGRAM(S): 1 INJECTION INTRAVENOUS at 14:56

## 2018-07-25 RX ADMIN — Medication 250 MILLIGRAM(S): at 06:49

## 2018-07-25 RX ADMIN — HEPARIN SODIUM 5000 UNIT(S): 5000 INJECTION INTRAVENOUS; SUBCUTANEOUS at 06:08

## 2018-07-25 RX ADMIN — Medication 40 MILLIGRAM(S): at 23:19

## 2018-07-25 NOTE — PROGRESS NOTE ADULT - ASSESSMENT
HPI:  See H and P for full details.  See in  ED noted to be in respiratory distress on Bipap with metabolic acidosis.  Given 2 amps bicarb and bicarb drip for metabolic acidosis, lactate 12. Spoke to her at length, she does not doctor has not seen and MD in many years. She was AAand 0 x 4 knows who the president is in spite of her hepatic encephalopathy, Denies ETOH/drugs/Hx of HIV or liver disease. Noted to be in fulminant hepatic failure, t bili 10.7, INR 2.76, AST 84  . Denies Tylenol consumption or any toxic ingestion. Post cardioversion for Afib with RVR earlier today, reportedly lost a pulse. In ICU, made decision to intubate . premedicated with 4 mg mversed, 100 mcg fent, started on propofol at 40. Intubated by myself on first attempt with glidescope 7.5 ETT to lip 21, placed RIJ TLC on first attempt. Bedside echo with RV dilation, severe TR, septal bowing, LV EF appears normal to slighlty decreased, I got LVOT VTI of 10 possibly indicating a component of LV failure that to me seems secondary to a primary RV proscess, possibly portopulmonary HTN. Basically my question is: is this a chronic liver picture causing portopulmonary HTN and RV failure or is this a primary RV failure causing hepatic congestion. Will check formal echo, fractionate the bilis, send HIV, hepatitis and autoimmune work up. Will treat the fulminant hepatic failure with N-acetylcycteine protocol which has good evidence behind it for all forms of fulminant hepatic failure, f/u a tylenol level, add lactulose for the hyperammonemia,Aztrenaom flagyl for intrabdominal proscess in setting of PCN allergey with unknown reaction, check urine electrolytes to R/O hepatorenal syndrome, levophed to keep MAP > 65 mmhg, Check formal echo, trend cardiac enzymes. GI, renal, cards consults. C/W bicarb drip for now is making some urine. Non contrast CT H/C/A/P to evaluate for sources of sepsis.  Prognosis is guarded. She told me she has a daughter named Osvaldo in [place] who she wishes to be her HCP, thou sounds like she may be sestranged from this daughter.  CCM time initial 46 min plus another 1 hour, total 1 hr and 46 minuted included recieving patient from ER, preparation for intubation  intubation, central line placement bedside echo.  ----------------------- as Above --------------------------------------------------------------------------------------------------  Np history obtainable besides above. Intubated unresponsive. Notes reviewed. See Ct scan / sonogram. Bilirubin shows improvement over a short period of time  ---------------- Elevated Bili > transaminases/alk phos  - Seconadry to acute on chronic liver disease , VS side effects of medications. 1) supportive care  2) f/u LFTs 3) work up for underlying liver disease

## 2018-07-25 NOTE — CHART NOTE - NSCHARTNOTEFT_GEN_A_CORE
PAtient noted to have increase work of breathing and shortness of breath, then went into tachycardia to 150's.  10mg of cardizem was given, then HR came down to high 90's.    Patient then became hypoxic, placed on non-breather, then switched to bipap machine: 20/10/100%  saturating in the 90's, RR 30-40  Patient re-evaluated in 20 minutes, still showing increase work of breathing   Patient subsequently intubated by Dr Matson with 30mg of propofol for sedation.

## 2018-07-25 NOTE — PROGRESS NOTE ADULT - PROBLEM SELECTOR PLAN 1
No significant changes in LFTs.   No signs of hemochromatosis. MAZIN  1 : 320 but biliruben improving without any direct treatment.  - Supportive care for now ( follow up labs ordered )

## 2018-07-25 NOTE — PROGRESS NOTE ADULT - ASSESSMENT
55 yo F arrived with septic shock, with acute kidney injury acute respiratory failure, with persistent lactic acidosis in the setting of emphysematous pyelonephritis.      Neuro: Re-intubated overnight for acute pulmonary edema.  Now sedated with propofol and precedex.    CV: Septic shock, now resolved.  Noted to have episodic tachycardia; started on levophed post-intubation.    Pulm: Acute pulmonary edema; on 10 PEEP this morning 50%; Now down to PEEP 5 40%;   B-lines on US this morning.  Will attempt to diurese today.    GI: Extubated today, speech and swallow at bedside, start soft diet.  MAZIN noted to be elevated, 1:320; ANCA neg, C3, C4 WNL.    Renal/Metabolic: Acute emphysematous pyelonephritis, with acute kidney injury now resolved; follow up urology.   ID: Emphysematous pyelo, now on Vancomycin, Fluconazole and Meropenem; followup ID for course.    Endo: No acute issues  Dispo: Re-intubated for acute pulmonary edema    Critical Care Time Spent 35 min.
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
## 2018-07-25 NOTE — PROGRESS NOTE ADULT - SUBJECTIVE AND OBJECTIVE BOX
INTERVAL HPI/OVERNIGHT EVENTS:      55 yo F arrived with septic shock, with acute kidney injury acute respiatory failure, with persistent lactic acidosis in the setting of emphysematous pyelonephritis.  Extubated on 7/23/18.  24 hour events: Was noted to become acutely short of breath, with irregular heart rate noted to be tachycardic with B-lines on bedside ultrasound.      CENTRAL LINE: [x ] YES [ ] NO  LOCATION:  [facility]  DATE INSERTED:7/25/18  REMOVE: [ ] YES [x ] NO  EXPLAIN:    BARNES: [x ] YES [ ] NO    DATE INSERTED:7/25/18  REMOVE:  [ ] YES [ x] NO  EXPLAIN:    A-LINE:  [ ] YES [ ] NO  LOCATION:   DATE INSERTED:  REMOVE:  [ ] YES [ ] NO  EXPLAIN:    REVIEW OF SYSTEMS: [x] Unable to obtain because: intubated and sedated  ICU Vital Signs Last 24 Hrs  T(C): 36.7 (25 Jul 2018 11:21), Max: 37 (25 Jul 2018 03:30)  T(F): 98.1 (25 Jul 2018 11:21), Max: 98.6 (25 Jul 2018 03:30)  HR: 60 (25 Jul 2018 14:30) (53 - 148)  BP: 108/70 (25 Jul 2018 14:30) (56/29 - 152/113)  BP(mean): 82 (25 Jul 2018 14:30) (37 - 125)  ABP: --  ABP(mean): --  RR: 16 (25 Jul 2018 14:30) (12 - 34)  SpO2: 100% (25 Jul 2018 14:30) (80% - 100%)      ABG - ( 25 Jul 2018 01:17 )  pH, Arterial: x     pH, Blood: 7.42  /  pCO2: 42    /  pO2: 76    / HCO3: 27    / Base Excess: 2.7   /  SaO2: 94                  I&O's Detail    24 Jul 2018 07:01  -  25 Jul 2018 07:00  --------------------------------------------------------  IN:    norepinephrine Infusion: 58.5 mL    Oral Fluid: 635 mL    propofol Infusion: 116.9 mL    Solution: 250 mL    Solution: 100 mL    Solution: 150 mL  Total IN: 1310.4 mL    OUT:    Indwelling Catheter - Urethral: 1435 mL  Total OUT: 1435 mL    Total NET: -124.6 mL      25 Jul 2018 07:01  -  25 Jul 2018 15:13  --------------------------------------------------------  IN:    norepinephrine Infusion: 26.8 mL    propofol Infusion: 217.1 mL  Total IN: 243.9 mL    OUT:    Indwelling Catheter - Urethral: 2200 mL  Total OUT: 2200 mL    Total NET: -1956.1 mL          Mode: AC/ CMV (Assist Control/ Continuous Mandatory Ventilation)  RR (machine): 14  TV (machine): 400  FiO2: 55  PEEP: 5  ITime: 0.99  MAP: 13  PIP: 25    CAPILLARY BLOOD GLUCOSE          MEDICATIONS  NEURO  Meds: dexmedetomidine Infusion 0.5 MICROgram(s)/kG/Hr (11.188 mL/Hr) IV Continuous <Continuous>  propofol Infusion 10 MICROgram(s)/kG/Min (5.37 mL/Hr) IV Continuous <Continuous>    RESPIRATORY  ABG - ( 25 Jul 2018 01:17 )  pH: x     /  pCO2: 42    /  pO2: 76    / HCO3: 27    / Base Excess: 2.7   /  SaO2: 94      Lactate: x                Meds:   CARDIOVASCULAR  Meds: midodrine 20 milliGRAM(s) Oral every 8 hours  norepinephrine Infusion 0.05 MICROgram(s)/kG/Min (8.391 mL/Hr) IV Continuous <Continuous>    GI/NUTRITION  Meds: pantoprazole  Injectable 40 milliGRAM(s) IV Push daily    GENITOURINARY  Meds:   HEMATOLOGIC  Meds: heparin  Injectable 5000 Unit(s) SubCutaneous every 8 hours    [x] VTE Prophylaxis  INFECTIOUS DISEASES  Meds: fluconAZOLE IVPB      fluconAZOLE IVPB 200 milliGRAM(s) IV Intermittent every 24 hours  meropenem  IVPB 1000 milliGRAM(s) IV Intermittent every 8 hours  meropenem  IVPB      vancomycin  IVPB      vancomycin  IVPB 750 milliGRAM(s) IV Intermittent every 12 hours    ENDOCRINE  CAPILLARY BLOOD GLUCOSE        Meds:   OTHER MEDICATIONS:  chlorhexidine 0.12% Liquid 15 milliLiter(s) Swish and Spit two times a day  chlorhexidine 4% Liquid 1 Application(s) Topical <User Schedule>  lidocaine 1% (Preservative-free) Injectable 20 milliLiter(s) Local Injection once  :    PHYSICAL EXAM:    GENERAL: NAD, diffusely edematous  NECK: Supple, No JVD, Normal thyroid, R TLC in place  CHEST/LUNG: intubated coarse breath sounds  HEART: Regular rate and rhythm; No murmurs, rubs, or gallops  ABDOMEN: Soft, Nontender, Nondistended; Bowel sounds present  EXTREMITIES:  Diffuse anasarca  SKIN: No rashes or lesions  NERVOUS SYSTEM:  Intubated and sedated  LABS:                        10.4   15.29 )-----------( 160      ( 25 Jul 2018 03:41 )             32.4      07-25    141  |  104  |  21  ----------------------------<  85  3.9   |  27  |  0.73    Ca    8.0<L>      25 Jul 2018 03:41  Phos  2.5     07-25  Mg     1.8     07-25    TPro  6.7  /  Alb  1.9<L>  /  TBili  4.4<H>  /  DBili  3.63<H>  /  AST  40<H>  /  ALT  45  /  AlkPhos  100  07-25    PT/INR - ( 24 Jul 2018 22:52 )   PT: 17.1 sec;   INR: 1.55 ratio         PTT - ( 24 Jul 2018 22:52 )  PTT:30.5 sec    Culture Results:   No growth at 48 hours (07-22 @ 23:26)      RADIOLOGY & ADDITIONAL STUDIES:  < from: Xray Chest 1 View-PORTABLE IMMEDIATE (07.25.18 @ 00:35) >  IMPRESSION:   Cardiomegaly and persistent pulmonary edema changes.  Persistent small bilateral pleural effusions.  Life supporting devices in appropriate position.                 ANN BLACK M.D.,ATTENDING RADIOLOGIST  This document has been electronically signed. Jul 25 2018  1:33PM    < end of copied text >

## 2018-07-25 NOTE — PROGRESS NOTE ADULT - ASSESSMENT
53yo lady who presented to the ER in respiratory distress / metabolic acidosis / hepatic encephalopathy / hepatic failure.  Intubated after a ?PEA arrest s/p DCCV for Afib with RVR.  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  Recommend starting low-dose cardizem and monitoring (responded well this AM),  -supportive care; WBC 10-> 15  -replete lytes  -monitor creat/LFTs  -repeat 2D echo prior to d/c; cardiomyopathy likely 2/2 septic shock

## 2018-07-25 NOTE — PROGRESS NOTE ADULT - SUBJECTIVE AND OBJECTIVE BOX
UROLOGY PROGRESS NOTE:    Patient seen and examined bedside resting comfortably in CCU, re-intubated last night due to hypoxia and respiratory distress. Patient now intubated and sedated.    T(F): 98.6 (07-25-18 @ 05:06), Max: 98.6 (07-25-18 @ 03:30)  HR: 64 (07-25-18 @ 05:24) (64 - 148)  BP: 131/88 (07-25-18 @ 03:30) (87/59 - 152/113)  RR: 19 (07-25-18 @ 03:30) (12 - 34)  SpO2: 100% (07-25-18 @ 05:24) (80% - 100%)    PHYSICAL EXAM:    General: NAD, Intubated and sedated  HEENT: NCAT, ET tube in position  CV: +S1+S2  Lung: clear to auscultation bilaterally  Abdomen: soft, NTND. Normoactive BS  Extremities: no pedal edema or calf tenderness noted   : nova catheter intact draining yellow urine, output 1435cc/24hr    LABS:                        10.4   15.29 )-----------( 160      ( 25 Jul 2018 03:41 )             32.4   07-25    141  |  104  |  21  ----------------------------<  85  3.9   |  27  |  0.73    Ca    8.0<L>      25 Jul 2018 03:41  Phos  2.5     07-25  Mg     1.8     07-25    TPro  6.7  /  Alb  1.9<L>  /  TBili  4.4<H>  /  DBili  3.63<H>  /  AST  40<H>  /  ALT  45  /  AlkPhos  100  07-25  PT/INR - ( 24 Jul 2018 22:52 )   PT: 17.1 sec;   INR: 1.55 ratio         PTT - ( 24 Jul 2018 22:52 )  PTT:30.5 sec  I&O's Detail    23 Jul 2018 07:01  -  24 Jul 2018 07:00  --------------------------------------------------------  IN:    Enteral Tube Flush: 250 mL    lactated ringers.: 160 mL    Oral Fluid: 270 mL    Solution: 100 mL    Solution: 250 mL    Solution: 100 mL    Vital HN: 300 mL  Total IN: 1430 mL    OUT:    Indwelling Catheter - Urethral: 2340 mL    Rectal Tube: 100 mL  Total OUT: 2440 mL    Total NET: -1010 mL      24 Jul 2018 07:01  -  25 Jul 2018 06:08  --------------------------------------------------------  IN:    norepinephrine Infusion: 23.4 mL    Oral Fluid: 635 mL    propofol Infusion: 51.8 mL    Solution: 100 mL    Solution: 50 mL  Total IN: 860.2 mL    OUT:    Indwelling Catheter - Urethral: 1435 mL  Total OUT: 1435 mL    Total NET: -574.8 mL    Assessment: 53yo Female admitted with septic shock due to Left emphysematous pyelonephritis, respiratory failure, found with staghorn calculous of Left kidney on CT. Urine culture 7/19 with + e coli, klebsiella, CNS and enterococcus faecalis. Patient re-intubated last night for respiratory distress and hypoxia.     Plan:  Continue primary medical management per ICU team, wean as tolerated  continue Antibiotics per ID  continue nova, monitor urine output  As discussed with Dr Frances. No surgical intervention planned at present time. Recommend KUB for Follow up.

## 2018-07-25 NOTE — PROGRESS NOTE ADULT - SUBJECTIVE AND OBJECTIVE BOX
TMAX - 98.6    On day # 7 Meropenem / # 7 Vanco / # 7 Diflucan now    Vital Signs Last 24 Hrs  T(C): 36.8 (25 Jul 2018 15:30), Max: 37 (25 Jul 2018 03:30)  T(F): 98.3 (25 Jul 2018 15:30), Max: 98.6 (25 Jul 2018 03:30)  HR: 54 (25 Jul 2018 17:09) (53 - 148)  BP: 106/67 (25 Jul 2018 16:00) (56/29 - 152/113)  BP(mean): 80 (25 Jul 2018 16:00) (37 - 125)  RR: 0 (25 Jul 2018 16:00) (0 - 34)  SpO2: 100% (25 Jul 2018 17:09) (80% - 100%)  Mode: AC/ CMV (Assist Control/ Continuous Mandatory Ventilation)  RR (machine): 14  TV (machine): 400  FiO2: 50  PEEP: 5  ITime: 1  MAP: 8  PIP: 20  Supplemental O2:  on 50% FIO2 + 5 PEEP now      Above notes appreciated - patient was extubated and ready for transfer out of the ICU but required re-intubation last PM for Respiratory Distress and Tachycardia.  Presently sedated on ventilator and on Levophed for BP support.  New LUE Midline was placed yesterday and RIJ CVP had been d/c'ed, and now with new LIJ CVP placed for continued IV access for all the IVPB meds required.  Noted RF is negative and C3 and C4 are WNL, with CH50 still pending ( MAZIN + 1:320 Homogeneous pattern).        PHYSICAL EXAM  General:  sedated, on ventilator, and with NGT in place, appears comfortable at present   HEENT:  conj pink, sclerae mildly muddy in appearance, PERRLA, no oral lesions noted but orally intubated now and with NGT in place with feedings in progress  Neck:  semi- supple, no nodes noted            LIJ CVP in place now - site clean with dressing dry and intact - placed today ( 7/25 )  Heart:  RR  Lungs:  few bilateral rhonchi  Abdomen:  BS+, semi-soft, appears nontender to palpation                   abdominal wall with persistent firm edema  Extremities:  2+ edema LE's                     Arms and hands remain swollen as well                     LUE with Midline in place - site clean and dressing intact - placed 7/24/18                     Rt hand with IV in place   Skin:  warm, dry, no rash noted  Serena remains in place and currently draining clearer light sadia-colored urine      I&O's Summary :    24 Jul 2018 07:01  -  25 Jul 2018 07:00  --------------------------------------------------------  IN: 1310.4 mL / OUT: 1435 mL / NET: -124.6 mL    25 Jul 2018 07:01  -  25 Jul 2018 17:57  --------------------------------------------------------  IN: 531.1 mL / OUT: 2380 mL / NET: -1848.9 mL        LABS:  CBC Full  -  ( 25 Jul 2018 03:41 )  WBC Count : 15.29 K/uL  Hemoglobin : 10.4 g/dL  Hematocrit : 32.4 %  Platelet Count - Automated : 160 K/uL  Mean Cell Volume : 90.3 fl  Mean Cell Hemoglobin : 29.0 pg  Mean Cell Hemoglobin Concentration : 32.1 gm/dL  Auto Neutrophil # : x  Auto Lymphocyte # : x  Auto Monocyte # : x  Auto Eosinophil # : x  Auto Basophil # : x  Auto Neutrophil % : x  Auto Lymphocyte % : x  Auto Monocyte % : x  Auto Eosinophil % : x  Auto Basophil % : x    07-25    143  |  106  |  15  ----------------------------<  80  3.6   |  30  |  0.60    Ca    8.0<L>      25 Jul 2018 15:21  Phos  2.5     07-25  Mg     1.8     07-25    TPro  6.7  /  Alb  1.9<L>  /  TBili  4.4<H>  /  DBili  3.63<H>  /  AST  40<H>  /  ALT  45  /  AlkPhos  100  07-25    LIVER FUNCTIONS - ( 25 Jul 2018 03:41 )  Alb: 1.9 g/dL / Pro: 6.7 gm/dL / ALK PHOS: 100 U/L / ALT: 45 U/L / AST: 40 U/L / GGT: x             PT/INR - ( 24 Jul 2018 22:52 )   PT: 17.1 sec;   INR: 1.55 ratio       PTT - ( 24 Jul 2018 22:52 )  PTT:30.5 sec    ABG - ( 25 Jul 2018 01:17 )  pH, Arterial: x     pH, Blood: 7.42  /  pCO2: 42    /  pO2: 76    / HCO3: 27    / Base Excess: 2.7   /  SaO2: 94          CARDIAC MARKERS ( 25 Jul 2018 15:21 )  .042 ng/mL / x     / 29 U/L / x     / <1.0 ng/mL    CARDIAC MARKERS ( 25 Jul 2018 03:41 )  .061 ng/mL / x     / 41 U/L / x     / x        CARDIAC MARKERS ( 24 Jul 2018 22:52 )  .040 ng/mL / x     / 45 U/L / x     / x          Rheumatoid Factor Quant, Serum or Plasma: <10 IU/mL (07-24 @ 08:09)    Sedimentation Rate, Erythrocyte: 28 mm/hr (07-24 @ 06:54)      Vancomycin Level, Trough: 16.7 ug/mL (07-23 @ 17:11)      Amylase, Serum Total: 60 U/L (07-20 @ 02:15)    Lipase, Serum: 270 U/L (07-20 @ 02:15)    Amylase, Serum Total: 69 U/L (07-19 @ 12:38)    Lipase, Serum: 169 U/L (07-19 @ 12:38)        MICROBIOLOGY:  Specimen Source: .Sputum Sputum (07-22 @ 23:26)  Culture Results:   No growth at 48 hours (07-22 @ 23:26)    Specimen Source: .Urine Clean Catch (Midstream) (07-19 @ 12:46)  Culture Results:   >100,000 CFU/ml Escherichia coli  >100,000 CFU/ml Klebsiella pneumoniae  >100,000 CFU/ml Coag Negative Staphylococcus  >100,000 CFU/ml Enterococcus faecalis (07-19 @ 12:46)  Culture - Urine (07.19.18 @ 12:46)    -  Gentamicin: S <=1 Should not be used as monotherapy    -  Gentamicin: S <=4    -  Gentamicin: S <=1    -  Imipenem: S <=1    -  Imipenem: S <=1    -  Levofloxacin: S 2    -  Levofloxacin: S <=2    -  Levofloxacin: S <=1    -  Meropenem: S <=1    -  Meropenem: S <=1    -  Nitrofurantoin: S <=32 Should not be used to treat pyelonephritis.    -  Nitrofurantoin: S <=32 Should not be used to treat pyelonephritis    -  Nitrofurantoin: S <=32 Should not be used to treat pyelonephritis    -  Oxacillin: S <=0.25    -  Piperacillin/Tazobactam: S <=16    -  Piperacillin/Tazobactam: S <=8    -  RIF- Rifampin: S <=1 Should not be used as monotherapy    -  Tetra/Doxy: S <=1    -  Tetra/Doxy: S <=1    -  Tigecycline: S <=2    -  Tigecycline: S <=1    -  Tobramycin: S <=4    -  Tobramycin: S <=2    -  Trimethoprim/Sulfamethoxazole: S <=0.5/9.5    -  Trimethoprim/Sulfamethoxazole: S <=2/38    -  Trimethoprim/Sulfamethoxazole: S <=0.5/9.5    -  Vancomycin: S 4    -  Vancomycin: S 2    -  Amikacin: S <=16    -  Amikacin: S <=8    -  Amoxicillin/Clavulanic Acid: S <=8/4    -  Ampicillin: S <=2 Predicts results to ampicillin/sulbactam, amoxacillin-clavulanate and  piperacillin-tazobactam.    -  Ampicillin: R 16 These ampicillin results predict results for amoxicillin    -  Ampicillin: S <=2 These ampicillin results predict results for amoxicillin    -  Ampicillin/Sulbactam: S <=8/4    -  Ampicillin/Sulbactam: S <=8/4    -  Ampicillin/Sulbactam: S <=4/2    -  Aztreonam: S <=4    -  Aztreonam: S <=4    -  Cefazolin: S <=4    -  Cefazolin: S <=8 This predicts results for oral agents cefaclor, cefdinir, cefpodoxime, cefprozil, cefuroxime axetil, cephalexin and locarbef for uncomplicated UTI. Note that some isolates may be susceptible to these agents while testing resistant to cefazolin.    -  Cefazolin: S <=2 This predicts results for oral agents cefaclor, cefdinir, cefpodoxime, cefprozil, cefuroxime axetil, cephalexin and locarbef for uncomplicated UTI. Note that some isolates may be susceptible to these agents while testing resistant to cefazolin.    -  Cefepime: S <=4    -  Cefepime: S <=2    -  Cefoxitin: S <=8    -  Cefoxitin: S <=4    -  Ceftriaxone: S <=1 Enterobacter, Citrobacter, and Serratia may develop resistance during prolonged therapy    -  Ceftriaxone: S <=1 Enterobacter, Citrobacter, and Serratia may develop resistance during prolonged therapy    -  Ciprofloxacin: I 2    -  Ciprofloxacin: S <=1    -  Ciprofloxacin: S <=0.5    -  Ertapenem: S <=1    -  Ertapenem: S <=0.5    Specimen Source: .Urine Clean Catch (Midstream)    Culture Results:   >100,000 CFU/ml Escherichia coli  >100,000 CFU/ml Klebsiella pneumoniae  >100,000 CFU/ml Coag Negative Staphylococcus  >100,000 CFU/ml Enterococcus faecalis    Organism Identification: Escherichia coli  Klebsiella pneumoniae  Coag Negative Staphylococcus  Enterococcus faecalis    Organism: Enterococcus faecalis    Organism: Coag Negative Staphylococcus    Organism: Klebsiella pneumoniae    Organism: Escherichia coli    Method Type: VICENTE    Method Type: VICENTE    Method Type: VICENTE    Method Type: VICENTE      Specimen Source: .Blood Blood-Peripheral (07-19 @ 08:31)  Culture Results:   No growth at 5 days. (07-19 @ 08:31)    Specimen Source: .Blood Blood-Peripheral (07-19 @ 08:31)  Culture Results:   Growth in aerobic bottle: Coag Negative Staphylococcus        Radiology:  CXR - < from: Xray Chest 1 View-PORTABLE IMMEDIATE (07.25.18 @ 15:22) >    INTERPRETATION:  AP supine chest on July 25, 2018 at 3:03 PM. Patient is   respirator dependent.    Heart is likely enlarged.    Endotracheal tube andnasogastric tube again noted.    Significant congestive lung and pleural findings again noted.    These findings are similar to earlier study of the same day.    Present film shows a left jugular line inserted with the tip in the   superior vena caval area.    IMPRESSION: Persistent congestive findings. Left jugular line inserted.                              < from: IR US Guided Vascular Access (07.24.18 @ 16:10) >  INTERPRETATION:  Procedure: Midline venous catheter insertion. Images   saved to PACS.    Clinical Information: Pyelonephritis    Technique: The patient was placed supine on the procedure table. The left   arm was prepped and draped in the usual sterile fashion. A timeout was   performed. 1% lidocaine used for local anesthesia.    Ultrasound evaluation demonstrates the left basilic vein to be patent.   Under ultrasound guidance, the left basilic vein was accessed with 21   gauge micropuncture needle, and then a 0.018 guidewire advanced into   centrally. A coaxial peel-away sheath was exchanged for the puncture   needle. Through the peel-away sheath, an 18-gauge 10 cm midline venous   catheter was placed. The catheter was secured to the skin and a sterile   dressing placed. No complications.    Sedation: None.    Impression: Successful placement of a left arm midline venous catheter.    Plan: Okay to use midline.                     Impression:   Recurrent Respiratory Failure with episode of Tachycardia last PM,  with ongoing rx for Sepsis with Shock secondary to Emphysematous Lt. Pyelonephritis with underlying Staghorn Calculus and Multifocal PNA - ? Aspiration.  Noted slight increase in WBC's again although TBili and LFT's continue to improve slowly.  although Blood Cx's + for only 1 bottle + CNS ( presumably a contaminant ), urine Cx + for EColi, Kleb Pneumo, CNS, and Enterococcus Faecalis.      Suggestions:  Will therefore continue current ab rx and follow-up temps and labs.  Will request repeat Sputum Cx and follow-up CH50 results when available.  Follow-up CXR.

## 2018-07-25 NOTE — PROGRESS NOTE ADULT - SUBJECTIVE AND OBJECTIVE BOX
55yo Female who presented to the ER in respiratory distress / metabolic acidosis / hepatic encephalopathy / hepatic failure.  Intubated after a ?PEA arrest s/p DCCV for Afib with RVR.  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  Echo: LVEF 30%, RV enlargement, mod MR    O/N: mitul on dig yesterday so d/c'd; episodes of tachycardia overnight that responded to cardizem; on midodrine    MEDICATIONS  (STANDING):  chlorhexidine 0.12% Liquid 15 milliLiter(s) Swish and Spit two times a day  chlorhexidine 4% Liquid 1 Application(s) Topical <User Schedule>  dexmedetomidine Infusion 0.5 MICROgram(s)/kG/Hr (11.188 mL/Hr) IV Continuous <Continuous>  fluconAZOLE IVPB      fluconAZOLE IVPB 200 milliGRAM(s) IV Intermittent every 24 hours  heparin  Injectable 5000 Unit(s) SubCutaneous every 8 hours  lidocaine 1% (Preservative-free) Injectable 20 milliLiter(s) Local Injection once  meropenem  IVPB 1000 milliGRAM(s) IV Intermittent every 8 hours  meropenem  IVPB      midodrine 10 milliGRAM(s) Oral every 8 hours  norepinephrine Infusion 0.05 MICROgram(s)/kG/Min (8.391 mL/Hr) IV Continuous <Continuous>  pantoprazole  Injectable 40 milliGRAM(s) IV Push daily  propofol Infusion 10 MICROgram(s)/kG/Min (5.37 mL/Hr) IV Continuous <Continuous>  vancomycin  IVPB      vancomycin  IVPB 750 milliGRAM(s) IV Intermittent every 12 hours    MEDICATIONS  (PRN):          PHYSICAL EXAMINATION:  -----------------------------  Vital Signs Last 24 Hrs  T(C): 36.9 (25 Jul 2018 07:12), Max: 37 (25 Jul 2018 03:30)  T(F): 98.5 (25 Jul 2018 07:12), Max: 98.6 (25 Jul 2018 03:30)  HR: 56 (25 Jul 2018 10:00) (56 - 148)  BP: 108/74 (25 Jul 2018 10:00) (56/29 - 152/113)  BP(mean): 86 (25 Jul 2018 10:00) (37 - 125)  RR: 15 (25 Jul 2018 10:00) (12 - 34)  SpO2: 100% (25 Jul 2018 10:00) (80% - 100%)    Constitutional: intubated  Eyes: the conjunctiva exhibited no abnormalities and the eyelids demonstrated no xanthelasmas.   HEENT: normal oral mucosa, no oral pallor and no oral cyanosis.   Neck: normal jugular venous A waves present, normal jugular venous V waves present and no jugular venous mcknight A waves.   Pulmonary: no respiratory distress on vent support, normal respiratory rhythm and effort, no accessory muscle use and lungs were clear  Cardiovascular: irreg irreg; 2/6 SM  Abdomen: soft, non-tender, no hepato-splenomegaly  Musculoskeletal: the gait could not be assessed.  Extremities: no clubbing of the fingernails, no localized cyanosis  Skin: normal skin color and pigmentation, no rash, no venous stasis    LABS:   --------                                   10.4   15.29 )-----------( 160      ( 25 Jul 2018 03:41 )             32.4   07-25    141  |  104  |  21  ----------------------------<  85  3.9   |  27  |  0.73    Ca    8.0<L>      25 Jul 2018 03:41  Phos  2.5     07-25  Mg     1.8     07-25    TPro  6.7  /  Alb  1.9<L>  /  TBili  4.4<H>  /  DBili  3.63<H>  /  AST  40<H>  /  ALT  45  /  AlkPhos  100  07-25          INTERPRETATION:  CT of the chest, abdomen, and pelvis without contrast    Indication: Shock. Respiratory failure. Jaundice. Elevated bilirubin.    Technique: Axial multidetector CT images of the chest, abdomen, and   pelvis are acquired without IV or oral contrast.    Comparison: None.    Findings:    Chest: Mild bilateral pleural effusions. Small right pericardial   effusion. Cardiomegaly. No aortic aneurysm. Allowing for the noncontrast   technique, there is no grossly enlarged mediastinal, hilar, or axillary   lymph node.    ET tube terminates above the nivia. NG tube terminates in the stomach.    Small densities in the right mainstem bronchus may represent mucous   material.    Mild emphysematous changes in the right lung, suggestive of COPD. Small   bilateral atelectasis. Mild hazy groundglass densities in both lungs may   represent pulmonary edema or pneumonia. No evidence for pneumothorax.    Abdomen: Evaluation of the abdomen and pelvis is limited by lack of IV   and oral contrast. Allowing for noncontrast technique, the liver,   pancreas, spleen, and the right kidney appear grossly unremarkable. There   is a staghorn calculus in the left kidney. Apparent air in the left renal   calyces. No hydronephrosis.    Nonspecific adrenal thickening bilaterally.    The gallbladder appears contracted. Pericholecystic fluid versus   gallbladder wall edema. The common bile duct is not visualized on this   limited examination. If clinically indicated, abdominal ultrasound may be   pursued for further evaluation.    No bowel obstruction, or grossly thickened bowel wall. The appendix is   not identified. No evidence free air, or enlarged lymph node. Mild to   moderate ascites in the abdomen and pelvis.    Pelvis: There is a Lyons catheter in the urinary bladder which contains   air. The urinary bladder is underdistended, rendering evaluation   difficult. The bowel structures appear grossly unremarkable. No grossly   enlarged pelvic lymph node.    There is diffuse body wall edema in the chest, abdomen, and pelvis.    Impression: Anasarca.    Mild bilateral pleural effusions. Small right pericardial effusion.    Small densities in the right mainstem bronchus may represent mucous   material. Follow-up chest CT may be pursued in 4-6 weeks to ensure   clearance.    Mild hazy groundglass densities in both lungs may represent pulmonary   edema or pneumonia. Clinical correlation is recommended.    The gallbladder appears contracted. Pericholecystic fluid versus   gallbladder wall edema. The common bile duct is not visualized on this   limited examination. If clinically indicated, abdominal ultrasound may be   pursued for further evaluation. Alternatively, abdominal MR without and   with IV contrast including MRCP may be pursued.    No bowel obstruction, or grossly thickened bowel wall. The appendix is   not identified.    Mild to moderate ascites in the abdomen and pelvis.    Lyons catheter in the urinary bladder. Associated air in the urinary   bladder. Staghorn calculus in the left kidney. Apparent air in the left   renal calyces may be due to reflux from the urinary bladder or may   represent emphysematous pyelitis. Clinical correlation is recommended.    < end of copied text >    < from: US Duplex Venous Lower Ext Complete, Bilateral (07.19.18 @ 16:51) >  IMPRESSION:     No evidence of bilateral lower extremity deep venous thrombosis.    < end of copied text >        < from: TTE Echo Doppler w/o Cont (07.19.18 @ 19:57) >   1. Left ventricular ejection fraction, by visual estimation, is 30 to   35%.   2. There is no left ventricular hypertrophy.   3. Biatrial enlargement.   4. Right ventricular dilatation.   5. Moderate mitral valve regurgitation.   6. Mild tricuspid regurgitation.   7. Pulmonic valve regurgitation.   8. Moderately to severely decreased global left ventricular systolic   function.      < end of copied text >

## 2018-07-26 LAB
ALBUMIN SERPL ELPH-MCNC: 1.6 G/DL — LOW (ref 3.3–5)
ALP SERPL-CCNC: 93 U/L — SIGNIFICANT CHANGE UP (ref 40–120)
ALT FLD-CCNC: 34 U/L — SIGNIFICANT CHANGE UP (ref 12–78)
ANION GAP SERPL CALC-SCNC: 8 MMOL/L — SIGNIFICANT CHANGE UP (ref 5–17)
AST SERPL-CCNC: 33 U/L — SIGNIFICANT CHANGE UP (ref 15–37)
BASOPHILS # BLD AUTO: 0.04 K/UL — SIGNIFICANT CHANGE UP (ref 0–0.2)
BASOPHILS NFR BLD AUTO: 0.5 % — SIGNIFICANT CHANGE UP (ref 0–2)
BILIRUB DIRECT SERPL-MCNC: 2.58 MG/DL — HIGH (ref 0.05–0.2)
BILIRUB INDIRECT FLD-MCNC: 0.4 MG/DL — SIGNIFICANT CHANGE UP (ref 0.2–1)
BILIRUB SERPL-MCNC: 3 MG/DL — HIGH (ref 0.2–1.2)
BUN SERPL-MCNC: 13 MG/DL — SIGNIFICANT CHANGE UP (ref 7–23)
CALCIUM SERPL-MCNC: 8.1 MG/DL — LOW (ref 8.5–10.1)
CHLORIDE SERPL-SCNC: 107 MMOL/L — SIGNIFICANT CHANGE UP (ref 96–108)
CO2 SERPL-SCNC: 29 MMOL/L — SIGNIFICANT CHANGE UP (ref 22–31)
CREAT SERPL-MCNC: 0.51 MG/DL — SIGNIFICANT CHANGE UP (ref 0.5–1.3)
CRP SERPL-MCNC: 9.21 MG/DL — HIGH (ref 0–0.4)
EOSINOPHIL # BLD AUTO: 0.18 K/UL — SIGNIFICANT CHANGE UP (ref 0–0.5)
EOSINOPHIL NFR BLD AUTO: 2.4 % — SIGNIFICANT CHANGE UP (ref 0–6)
ERYTHROCYTE [SEDIMENTATION RATE] IN BLOOD: 39 MM/HR — HIGH (ref 0–20)
GLUCOSE SERPL-MCNC: 82 MG/DL — SIGNIFICANT CHANGE UP (ref 70–99)
HCT VFR BLD CALC: 29.3 % — LOW (ref 34.5–45)
HGB BLD-MCNC: 9.2 G/DL — LOW (ref 11.5–15.5)
IMM GRANULOCYTES NFR BLD AUTO: 0.5 % — SIGNIFICANT CHANGE UP (ref 0–1.5)
LYMPHOCYTES # BLD AUTO: 1.51 K/UL — SIGNIFICANT CHANGE UP (ref 1–3.3)
LYMPHOCYTES # BLD AUTO: 20.5 % — SIGNIFICANT CHANGE UP (ref 13–44)
MAGNESIUM SERPL-MCNC: 1.6 MG/DL — SIGNIFICANT CHANGE UP (ref 1.6–2.6)
MCHC RBC-ENTMCNC: 29.3 PG — SIGNIFICANT CHANGE UP (ref 27–34)
MCHC RBC-ENTMCNC: 31.4 GM/DL — LOW (ref 32–36)
MCV RBC AUTO: 93.3 FL — SIGNIFICANT CHANGE UP (ref 80–100)
MONOCYTES # BLD AUTO: 0.7 K/UL — SIGNIFICANT CHANGE UP (ref 0–0.9)
MONOCYTES NFR BLD AUTO: 9.5 % — SIGNIFICANT CHANGE UP (ref 2–14)
NEUTROPHILS # BLD AUTO: 4.88 K/UL — SIGNIFICANT CHANGE UP (ref 1.8–7.4)
NEUTROPHILS NFR BLD AUTO: 66.6 % — SIGNIFICANT CHANGE UP (ref 43–77)
NRBC # BLD: 0 /100 WBCS — SIGNIFICANT CHANGE UP (ref 0–0)
NT-PROBNP SERPL-SCNC: 2168 PG/ML — HIGH (ref 0–125)
PHOSPHATE SERPL-MCNC: 2.2 MG/DL — LOW (ref 2.5–4.5)
PLATELET # BLD AUTO: 120 K/UL — LOW (ref 150–400)
POTASSIUM SERPL-MCNC: 3.5 MMOL/L — SIGNIFICANT CHANGE UP (ref 3.5–5.3)
POTASSIUM SERPL-SCNC: 3.5 MMOL/L — SIGNIFICANT CHANGE UP (ref 3.5–5.3)
PROT SERPL-MCNC: 5.9 GM/DL — LOW (ref 6–8.3)
RBC # BLD: 3.14 M/UL — LOW (ref 3.8–5.2)
RBC # FLD: 24.7 % — HIGH (ref 10.3–14.5)
SODIUM SERPL-SCNC: 144 MMOL/L — SIGNIFICANT CHANGE UP (ref 135–145)
VANCOMYCIN TROUGH SERPL-MCNC: 15.6 UG/ML — SIGNIFICANT CHANGE UP (ref 10–20)
WBC # BLD: 7.35 K/UL — SIGNIFICANT CHANGE UP (ref 3.8–10.5)
WBC # FLD AUTO: 7.35 K/UL — SIGNIFICANT CHANGE UP (ref 3.8–10.5)

## 2018-07-26 PROCEDURE — 99232 SBSQ HOSP IP/OBS MODERATE 35: CPT

## 2018-07-26 RX ORDER — POTASSIUM CHLORIDE 20 MEQ
20 PACKET (EA) ORAL ONCE
Qty: 0 | Refills: 0 | Status: COMPLETED | OUTPATIENT
Start: 2018-07-26 | End: 2018-07-26

## 2018-07-26 RX ORDER — POTASSIUM PHOSPHATE, MONOBASIC POTASSIUM PHOSPHATE, DIBASIC 236; 224 MG/ML; MG/ML
15 INJECTION, SOLUTION INTRAVENOUS ONCE
Qty: 0 | Refills: 0 | Status: COMPLETED | OUTPATIENT
Start: 2018-07-26 | End: 2018-07-26

## 2018-07-26 RX ORDER — FUROSEMIDE 40 MG
40 TABLET ORAL ONCE
Qty: 0 | Refills: 0 | Status: COMPLETED | OUTPATIENT
Start: 2018-07-26 | End: 2018-07-26

## 2018-07-26 RX ADMIN — MEROPENEM 100 MILLIGRAM(S): 1 INJECTION INTRAVENOUS at 13:31

## 2018-07-26 RX ADMIN — Medication 250 MILLIGRAM(S): at 17:19

## 2018-07-26 RX ADMIN — MEROPENEM 100 MILLIGRAM(S): 1 INJECTION INTRAVENOUS at 05:14

## 2018-07-26 RX ADMIN — CHLORHEXIDINE GLUCONATE 15 MILLILITER(S): 213 SOLUTION TOPICAL at 17:19

## 2018-07-26 RX ADMIN — CHLORHEXIDINE GLUCONATE 15 MILLILITER(S): 213 SOLUTION TOPICAL at 05:16

## 2018-07-26 RX ADMIN — PANTOPRAZOLE SODIUM 40 MILLIGRAM(S): 20 TABLET, DELAYED RELEASE ORAL at 11:45

## 2018-07-26 RX ADMIN — MEROPENEM 100 MILLIGRAM(S): 1 INJECTION INTRAVENOUS at 22:56

## 2018-07-26 RX ADMIN — CHLORHEXIDINE GLUCONATE 1 APPLICATION(S): 213 SOLUTION TOPICAL at 05:15

## 2018-07-26 RX ADMIN — POTASSIUM PHOSPHATE, MONOBASIC POTASSIUM PHOSPHATE, DIBASIC 63.75 MILLIMOLE(S): 236; 224 INJECTION, SOLUTION INTRAVENOUS at 06:16

## 2018-07-26 RX ADMIN — Medication 20 MILLIEQUIVALENT(S): at 06:06

## 2018-07-26 RX ADMIN — Medication 40 MILLIGRAM(S): at 09:35

## 2018-07-26 RX ADMIN — HEPARIN SODIUM 5000 UNIT(S): 5000 INJECTION INTRAVENOUS; SUBCUTANEOUS at 05:15

## 2018-07-26 RX ADMIN — Medication 250 MILLIGRAM(S): at 05:17

## 2018-07-26 RX ADMIN — FENTANYL CITRATE 4.47 MICROGRAM(S)/KG/HR: 50 INJECTION INTRAVENOUS at 01:05

## 2018-07-26 NOTE — PROGRESS NOTE ADULT - ASSESSMENT
HPI:  See H and P for full details.  See in  ED noted to be in respiratory distress on Bipap with metabolic acidosis.  Given 2 amps bicarb and bicarb drip for metabolic acidosis, lactate 12. Spoke to her at length, she does not doctor has not seen and MD in many years. She was AAand 0 x 4 knows who the president is in spite of her hepatic encephalopathy, Denies ETOH/drugs/Hx of HIV or liver disease. Noted to be in fulminant hepatic failure, t bili 10.7, INR 2.76, AST 84  . Denies Tylenol consumption or any toxic ingestion. Post cardioversion for Afib with RVR earlier today, reportedly lost a pulse. In ICU, made decision to intubate . premedicated with 4 mg mversed, 100 mcg fent, started on propofol at 40. Intubated by myself on first attempt with glidescope 7.5 ETT to lip 21, placed RIJ TLC on first attempt. Bedside echo with RV dilation, severe TR, septal bowing, LV EF appears normal to slighlty decreased, I got LVOT VTI of 10 possibly indicating a component of LV failure that to me seems secondary to a primary RV proscess, possibly portopulmonary HTN. Basically my question is: is this a chronic liver picture causing portopulmonary HTN and RV failure or is this a primary RV failure causing hepatic congestion. Will check formal echo, fractionate the bilis, send HIV, hepatitis and autoimmune work up. Will treat the fulminant hepatic failure with N-acetylcycteine protocol which has good evidence behind it for all forms of fulminant hepatic failure, f/u a tylenol level, add lactulose for the hyperammonemia,Aztrenaom flagyl for intrabdominal proscess in setting of PCN allergey with unknown reaction, check urine electrolytes to R/O hepatorenal syndrome, levophed to keep MAP > 65 mmhg, Check formal echo, trend cardiac enzymes. GI, renal, cards consults. C/W bicarb drip for now is making some urine. Non contrast CT H/C/A/P to evaluate for sources of sepsis.  Prognosis is guarded. She told me she has a daughter named Osvaldo in [place] who she wishes to be her HCP, thou sounds like she may be sestranged from this daughter.  CCM time initial 46 min plus another 1 hour, total 1 hr and 46 minuted included recieving patient from ER, preparation for intubation  intubation, central line placement bedside echo.  ----------------------- as Above --------------------------------------------------------------------------------------------------  Np history obtainable besides above. Intubated unresponsive. Notes reviewed. See Ct scan / sonogram. Bilirubin shows improvement over a short period of time  ---------------- Elevated Bili > transaminases/alk phos  - [place] to acute on chronic liver disease , VS side effects of medications. 1) supportive care  2) f/u LFTs 3) work up for underlying liver disease

## 2018-07-26 NOTE — PROGRESS NOTE ADULT - SUBJECTIVE AND OBJECTIVE BOX
55yo Female who presented to the ER in respiratory distress / metabolic acidosis / hepatic encephalopathy / hepatic failure.  Intubated after a ?PEA arrest s/p DCCV for Afib with RVR.  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     MEDICATIONS  (STANDING):  chlorhexidine 0.12% Liquid 15 milliLiter(s) Swish and Spit two times a day  chlorhexidine 4% Liquid 1 Application(s) Topical <User Schedule>  fentaNYL   Infusion. 0.5 MICROgram(s)/kG/Hr (4.475 mL/Hr) IV Continuous <Continuous>  heparin  Injectable 5000 Unit(s) SubCutaneous every 8 hours  lidocaine 1% (Preservative-free) Injectable 20 milliLiter(s) Local Injection once  meropenem  IVPB 1000 milliGRAM(s) IV Intermittent every 8 hours  meropenem  IVPB      norepinephrine Infusion 0.05 MICROgram(s)/kG/Min (8.391 mL/Hr) IV Continuous <Continuous>  pantoprazole   Suspension 40 milliGRAM(s) Oral daily  propofol Infusion 10 MICROgram(s)/kG/Min (5.37 mL/Hr) IV Continuous <Continuous>  vancomycin  IVPB      vancomycin  IVPB 750 milliGRAM(s) IV Intermittent every 12 hours    MEDICATIONS  (PRN):      PHYSICAL EXAMINATION:  -----------------------------  Vital Signs Last 24 Hrs  T(C): 38 (26 Jul 2018 10:30), Max: 38 (26 Jul 2018 10:30)  T(F): 100.4 (26 Jul 2018 10:30), Max: 100.4 (26 Jul 2018 10:30)  HR: 60 (26 Jul 2018 11:30) (48 - 112)  BP: 89/56 (26 Jul 2018 11:30) (84/61 - 133/87)  BP(mean): 66 (26 Jul 2018 11:30) (66 - 112)  RR: 0 (26 Jul 2018 11:30) (0 - 24)  SpO2: 98% (26 Jul 2018 11:30) (83% - 100%)    Constitutional: intubated  Eyes: the conjunctiva exhibited no abnormalities and the eyelids demonstrated no xanthelasmas.   HEENT: normal oral mucosa, no oral pallor and no oral cyanosis.   Neck: normal jugular venous A waves present, normal jugular venous V waves present and no jugular venous mcknight A waves.   Pulmonary: no respiratory distress on vent support; decreased BS at bases with mild rales  Cardiovascular: irreg irreg; 2/6 SM  Abdomen: soft, non-tender, no hepato-splenomegaly  Musculoskeletal: the gait could not be assessed.  Extremities: no clubbing of the fingernails, no localized cyanosis  Skin: normal skin color and pigmentation, no rash, no venous stasis    LABS:   --------                          9.2    7.35  )-----------( 120      ( 26 Jul 2018 03:43 )             29.3   07-26    144  |  107  |  13  ----------------------------<  82  3.5   |  29  |  0.51    Ca    8.1<L>      26 Jul 2018 03:43  Phos  2.2     07-26  Mg     1.6     07-26    TPro  5.9<L>  /  Alb  1.6<L>  /  TBili  3.0<H>  /  DBili  2.58<H>  /  AST  33  /  ALT  34  /  AlkPhos  93  07-26          INTERPRETATION:  CT of the chest, abdomen, and pelvis without contrast    Indication: Shock. Respiratory failure. Jaundice. Elevated bilirubin.    Technique: Axial multidetector CT images of the chest, abdomen, and   pelvis are acquired without IV or oral contrast.    Comparison: None.    Findings:    Chest: Mild bilateral pleural effusions. Small right pericardial   effusion. Cardiomegaly. No aortic aneurysm. Allowing for the noncontrast   technique, there is no grossly enlarged mediastinal, hilar, or axillary   lymph node.    ET tube terminates above the nivia. NG tube terminates in the stomach.    Small densities in the right mainstem bronchus may represent mucous   material.    Mild emphysematous changes in the right lung, suggestive of COPD. Small   bilateral atelectasis. Mild hazy groundglass densities in both lungs may   represent pulmonary edema or pneumonia. No evidence for pneumothorax.    Abdomen: Evaluation of the abdomen and pelvis is limited by lack of IV   and oral contrast. Allowing for noncontrast technique, the liver,   pancreas, spleen, and the right kidney appear grossly unremarkable. There   is a staghorn calculus in the left kidney. Apparent air in the left renal   calyces. No hydronephrosis.    Nonspecific adrenal thickening bilaterally.    The gallbladder appears contracted. Pericholecystic fluid versus   gallbladder wall edema. The common bile duct is not visualized on this   limited examination. If clinically indicated, abdominal ultrasound may be   pursued for further evaluation.    No bowel obstruction, or grossly thickened bowel wall. The appendix is   not identified. No evidence free air, or enlarged lymph node. Mild to   moderate ascites in the abdomen and pelvis.    Pelvis: There is a Lyons catheter in the urinary bladder which contains   air. The urinary bladder is underdistended, rendering evaluation   difficult. The bowel structures appear grossly unremarkable. No grossly   enlarged pelvic lymph node.    There is diffuse body wall edema in the chest, abdomen, and pelvis.    Impression: Anasarca.    Mild bilateral pleural effusions. Small right pericardial effusion.    Small densities in the right mainstem bronchus may represent mucous   material. Follow-up chest CT may be pursued in 4-6 weeks to ensure   clearance.    Mild hazy groundglass densities in both lungs may represent pulmonary   edema or pneumonia. Clinical correlation is recommended.    The gallbladder appears contracted. Pericholecystic fluid versus   gallbladder wall edema. The common bile duct is not visualized on this   limited examination. If clinically indicated, abdominal ultrasound may be   pursued for further evaluation. Alternatively, abdominal MR without and   with IV contrast including MRCP may be pursued.    No bowel obstruction, or grossly thickened bowel wall. The appendix is   not identified.    Mild to moderate ascites in the abdomen and pelvis.    Lyons catheter in the urinary bladder. Associated air in the urinary   bladder. Staghorn calculus in the left kidney. Apparent air in the left   renal calyces may be due to reflux from the urinary bladder or may   represent emphysematous pyelitis. Clinical correlation is recommended.    < end of copied text >    < from: US Duplex Venous Lower Ext Complete, Bilateral (07.19.18 @ 16:51) >  IMPRESSION:     No evidence of bilateral lower extremity deep venous thrombosis.    < end of copied text >        < from: TTE Echo Doppler w/o Cont (07.19.18 @ 19:57) >   1. Left ventricular ejection fraction, by visual estimation, is 30 to   35%.   2. There is no left ventricular hypertrophy.   3. Biatrial enlargement.   4. Right ventricular dilatation.   5. Moderate mitral valve regurgitation.   6. Mild tricuspid regurgitation.   7. Pulmonic valve regurgitation.   8. Moderately to severely decreased global left ventricular systolic   function.      < end of copied text >

## 2018-07-26 NOTE — PHYSICAL THERAPY INITIAL EVALUATION ADULT - IMPAIRMENTS FOUND, PT EVAL
aerobic capacity/endurance/ventilation and respiration/gas exchange/gait, locomotion, and balance/muscle strength

## 2018-07-26 NOTE — PHYSICAL THERAPY INITIAL EVALUATION ADULT - PERTINENT HX OF CURRENT PROBLEM, REHAB EVAL
as per EMR: admitted to the ICU now intubated on mechanical ventilation support for increased WOB and tachypnea likely 2/2 to respiratory compensation in the setting of severe HAGMA with LA of 12.2 and HCO3 of 7 now on Bicarb gtt, hypotension with sepsis/septic shock ?2/2 to nephrolithiasis with staghorn  vs. ?HRS vs. CRS, SHARYN likely in the setting of ischemic atn, fulminant liver failure.

## 2018-07-26 NOTE — PROGRESS NOTE ADULT - ASSESSMENT
55yo lady who presented to the ER in respiratory distress / metabolic acidosis / hepatic encephalopathy / hepatic failure.  Intubated after a ?PEA arrest s/p DCCV for Afib with RVR.  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    -cont abx for septic shock  -HRs currently 80s; unable to start any medications for rate control 2/2 hypotension requiring pressors; became mitul with dig.  -supportive care  -replete lytes  -monitor creat/LFTs  -would diurese further today; extubate when pulm status stable as per ICU team  -no acute intervention as per urology; medical therapy/abx  -repeat 2D echo prior to d/c; cardiomyopathy likely 2/2 septic shock

## 2018-07-26 NOTE — PROGRESS NOTE ADULT - ASSESSMENT
54F poor historian with unknown PMH presents from home after being on ground and unable to get up per report. EMS found pt on ground unable to obtain BP with monitor strip showing A-fib RVR. Pt cardioverted and converted to sinus rhythm. Also had FS 26 treated with dextrose. Admitted to ICU for severe sepsis with septic shock, UTI, emphysematous pyelonephritis, L staghorn calculi, ARF with ATN, acute respiratory failure requiring intubation in ICU 7/19, Extubated 7/23. Reintubated for hypoxic respiratory failure due to acute pulmonary edema, acute systolic heart failure, acute liver failure, hyperbilirubinemia, a-fib RVR, hypoglycemia, acute metabolic/hepatic encephalopathy.     1. NEURO  - encephalopathy resolved  - arousable to follow commands  - daily sedation vacation    2. PULM  - cont daily wean as tolerates  - failed wean today due to tachypnea  - will cont to diurese pt for pulmonary edema volume overload    3. CV  - remains in shock state  - on levophed, no further tachyarrhythmia  - bradycardia likely in setting of sedatives  - will cont to monitor HR  - check TSH/T3/T4 to r/o underlying thyroid disease causing rapid/slow HR  - will con to diurese with lasix for volume overload  - unclear if pt with hx of CHF vs cardiomyopathy due to underlying sepsis  - cardiology follow up    4. GI  - hyperbilirubinemia improving as well as LFTs  - cause of liver function abnormalities can be due to congestive hepatopathy vs underlying septic shock  - will cont to monitor  - pt on admission did receive NAC treatment in setting of liver failure and unknown hx    5. RENAL  - ARF with ATN due to septic shock  - nova draining urine, good urine output with diuresis  - will cont to monitor    6. ID  - septic shock due to emphysematous pyelonephritis  - u cx growing klebsiella, enterococcus, and e-coli  - cont with vanco and meropenem  - pt has been on fluconazole since admission 7/19 (day #8) likely can d/c antifungal coverage  - ID follow up    7. ENDO  - hypoglycemia resolved  - cont NGT feeds    8. GEN  - no family has been at bedside  - will need social work to locate family  - early mobilization as tolerates    Critical Care Time: 50 min 54F poor historian with unknown PMH presents from home after being on ground and unable to get up per report. EMS found pt on ground unable to obtain BP with monitor strip showing A-fib RVR. Pt cardioverted and converted to sinus rhythm. Also had FS 26 treated with dextrose. Admitted to ICU for severe sepsis with septic shock, UTI, emphysematous pyelonephritis, L staghorn calculi, ARF with ATN, acute respiratory failure requiring intubation in ICU 7/19, Extubated 7/23. Reintubated for hypoxic respiratory failure due to acute pulmonary edema, acute systolic heart failure, acute liver failure, hyperbilirubinemia, a-fib RVR, hypoglycemia, acute metabolic/hepatic encephalopathy., severe metabolic acidosis.     1. NEURO  - encephalopathy resolved  - arousable to follow commands  - daily sedation vacation    2. PULM  - cont daily wean as tolerates  - failed wean today due to tachypnea  - will cont to diurese pt for pulmonary edema volume overload    3. CV  - remains in shock state  - on levophed, no further tachyarrhythmia  - bradycardia likely in setting of sedatives  - will cont to monitor HR  - check TSH/T3/T4 to r/o underlying thyroid disease causing rapid/slow HR  - will con to diurese with lasix for volume overload  - unclear if pt with hx of CHF vs cardiomyopathy due to underlying sepsis  - cardiology follow up    4. GI  - hyperbilirubinemia improving as well as LFTs  - cause of liver function abnormalities can be due to congestive hepatopathy vs underlying septic shock  - will cont to monitor  - pt on admission did receive NAC treatment in setting of liver failure and unknown hx    5. RENAL  - ARF with ATN due to septic shock  - metabolic acidosis resolved  - nova draining urine, good urine output with diuresis  - will cont to monitor    6. ID  - septic shock due to emphysematous pyelonephritis  - u cx growing klebsiella, enterococcus, and e-coli  - cont with vanco and meropenem  - pt has been on fluconazole since admission 7/19 (day #8) likely can d/c antifungal coverage  - ID follow up    7. ENDO  - hypoglycemia resolved  - cont NGT feeds    8. GEN  - no family has been at bedside  - will need social work to locate family  - early mobilization as tolerates    Critical Care Time: 50 min

## 2018-07-26 NOTE — PROGRESS NOTE ADULT - SUBJECTIVE AND OBJECTIVE BOX
Patient seen and examined bedside resting comfortably in CCU, intubated and sedated.    T(F): 97.2 (07-25-18 @ 23:26), Max: 98.6 (07-25-18 @ 05:06)  HR: 52 (07-26-18 @ 04:30) (48 - 66)  BP: 96/62 (07-26-18 @ 04:30) (56/29 - 137/88)  RR: 14 (07-26-18 @ 04:30) (0 - 21)  SpO2: 100% (07-26-18 @ 04:30) (99% - 100%)      PHYSICAL EXAM:    General: NAD, alert and awake  HEENT: NCAT, ET tube in position.   Chest: Clear breath sounds b/l  Abdomen: soft, NT/ND.   Extremities: Calf soft, nontender b/l.   : No suprapubic tenderness or bladder distention.  Nova draining clear yellow urine (output 3510cc/24hr)    LABS:                        9.2    7.35  )-----------( 120      ( 26 Jul 2018 03:43 )             29.3   07-26    144  |  107  |  13  ----------------------------<  82  3.5   |  29  |  0.51    Ca    8.1<L>      26 Jul 2018 03:43  Phos  2.2     07-26  Mg     1.6     07-26    TPro  5.9<L>  /  Alb  1.6<L>  /  TBili  3.0<H>  /  DBili  2.58<H>  /  AST  33  /  ALT  34  /  AlkPhos  93  07-26  PT/INR - ( 24 Jul 2018 22:52 )   PT: 17.1 sec;   INR: 1.55 ratio         PTT - ( 24 Jul 2018 22:52 )  PTT:30.5 sec  I&O's Detail    24 Jul 2018 07:01  -  25 Jul 2018 07:00  --------------------------------------------------------  IN:    norepinephrine Infusion: 58.5 mL    Oral Fluid: 635 mL    propofol Infusion: 116.9 mL    Solution: 250 mL    Solution: 100 mL    Solution: 150 mL  Total IN: 1310.4 mL    OUT:    Indwelling Catheter - Urethral: 1435 mL  Total OUT: 1435 mL    Total NET: -124.6 mL      25 Jul 2018 07:01  -  26 Jul 2018 05:02  --------------------------------------------------------  IN:    fentaNYL Infusion.: 53.7 mL    norepinephrine Infusion: 102.6 mL    propofol Infusion: 385.8 mL    Solution: 250 mL    Solution: 100 mL    Solution: 100 mL    Vital HN: 570 mL  Total IN: 1562.1 mL    OUT:    Indwelling Catheter - Urethral: 3510 mL    Rectal Tube: 50 mL  Total OUT: 3560 mL    Total NET: -1997.9 mL    Assessment: 53yo Female admitted with septic shock due to Left emphysematous pyelonephritis, respiratory failure, found with staghorn calculous of Left kidney on CT. Urine culture 7/19 with + e coli, klebsiella, CNS and enterococcus faecalis. Patient re-intubated for respiratory distress and hypoxia.     Plan:  Continue primary medical management per ICU team, wean as tolerated  continue Antibiotics per ID  continue nova, monitor urine output  As discussed with Dr Frances. No surgical intervention planned at present time. Recommend CT scan of abdomen/pelvis to follow up left emphysematous pyelonephritis
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
## 2018-07-26 NOTE — PHYSICAL THERAPY INITIAL EVALUATION ADULT - ADDITIONAL COMMENTS
Pt is unable to give history at this time, no contact information in EMR. Social work has also attempted and there is no outside source of information, they have included this in their documentation.

## 2018-07-26 NOTE — PROGRESS NOTE ADULT - PROBLEM SELECTOR PLAN 1
Bili 3.0 - continues to improve.   No signs of hemochromatosis. MAZIN  1 : 320 but biliruben improving without any direct treatment.  - Supportive care for now ( follow up labs ordered )
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
## 2018-07-26 NOTE — PROGRESS NOTE ADULT - SUBJECTIVE AND OBJECTIVE BOX
TMAX - 100.4    On day # 8 Meropenem / # 8 Vanco / # 8 Diflucan    Vital Signs Last 24 Hrs  T(C): 38 (26 Jul 2018 10:30), Max: 38 (26 Jul 2018 10:30)  T(F): 100.4 (26 Jul 2018 10:30), Max: 100.4 (26 Jul 2018 10:30)  HR: 70 (26 Jul 2018 12:02) (48 - 112)  BP: 89/56 (26 Jul 2018 11:30) (84/61 - 133/87)  BP(mean): 66 (26 Jul 2018 11:30) (66 - 112)  RR: 0 (26 Jul 2018 11:30) (0 - 24)  SpO2: 100% (26 Jul 2018 12:02) (83% - 100%)  Mode: CPAP with PS  FiO2: 35  PEEP: 5  PS: 5  MAP: 6  PIP: 11  Supplemental O2:  on 35% FIO2 + 5 PEEP now      Awake, alert today on ventilator.  No c/o cp, no SOB, no abdominal pain now.  Pressors decreased now and with decreased FIO2 to 35% + 5 PEEP now.  Explained to patient regarding her Lt Kidney   infection and presence of a large kidney stone which patient claims she was not aware of and denied any hx of UTI's.      PHYSICAL EXAM  General:  awake, alert now on ventilator, nodding appropriately to questions and appears to understand, following commands, in NAD at present, sitting upright in bed  HEENT:  conj pink, sclerae muddy, PERRLA, orally intubated and with NGT in place with feeding in progress  Neck:  semi-supple, no nodes noted            LIJ CVP in place - site clean with dressing dry and intact - placed 7/25/18  Heart:  RR  Lungs:  few rhonchi anteriorly with some decreased BS at bases   Abdomen:  BS+, semi-soft, nontender to palpation now                   still with abdominal wall edema throughout  No CVA or Spinal tenderness elicited  Extremities: 2+ Edema LE's                    Both UE's remain swollen as well                    chronic skin  changes both LE's                    LUE with Midline in place - site clean with dressing dry and intact - placed 7/24/18                     Rt hand with IVHL in place  Skin:  warm, dry, no rash noted  Lyons remains in place with clearer yellow urine output now        I&O's Summary :    25 Jul 2018 07:01  -  26 Jul 2018 07:00  --------------------------------------------------------  IN: 2276 mL / OUT: 3650 mL / NET: -1374 mL    26 Jul 2018 07:01  -  26 Jul 2018 12:57  --------------------------------------------------------  IN: 294.7 mL / OUT: 875 mL / NET: -580.3 mL        LABS:  CBC Full  -  ( 26 Jul 2018 03:43 )  WBC Count : 7.35 K/uL  Hemoglobin : 9.2 g/dL  Hematocrit : 29.3 %  Platelet Count - Automated : 120 K/uL  Mean Cell Volume : 93.3 fl  Mean Cell Hemoglobin : 29.3 pg  Mean Cell Hemoglobin Concentration : 31.4 gm/dL  Auto Neutrophil # : 4.88 K/uL  Auto Lymphocyte # : 1.51 K/uL  Auto Monocyte # : 0.70 K/uL  Auto Eosinophil # : 0.18 K/uL  Auto Basophil # : 0.04 K/uL  Auto Neutrophil % : 66.6 %  Auto Lymphocyte % : 20.5 %  Auto Monocyte % : 9.5 %  Auto Eosinophil % : 2.4 %  Auto Basophil % : 0.5 %    07-26    144  |  107  |  13  ----------------------------<  82  3.5   |  29  |  0.51    Ca    8.1<L>      26 Jul 2018 03:43  Phos  2.2     07-26  Mg     1.6     07-26    TPro  5.9<L>  /  Alb  1.6<L>  /  TBili  3.0<H>  /  DBili  2.58<H>  /  AST  33  /  ALT  34  /  AlkPhos  93  07-26    LIVER FUNCTIONS - ( 26 Jul 2018 03:43 )  Alb: 1.6 g/dL / Pro: 5.9 gm/dL / ALK PHOS: 93 U/L / ALT: 34 U/L / AST: 33 U/L / GGT: x             PT/INR - ( 24 Jul 2018 22:52 )   PT: 17.1 sec;   INR: 1.55 ratio       PTT - ( 24 Jul 2018 22:52 )  PTT:30.5 sec    ABG - ( 25 Jul 2018 01:17 )  pH, Arterial: x     pH, Blood: 7.42  /  pCO2: 42    /  pO2: 76    / HCO3: 27    / Base Excess: 2.7   /  SaO2: 94            CARDIAC MARKERS ( 25 Jul 2018 15:21 )  .042 ng/mL / x     / 29 U/L / x     / <1.0 ng/mL    CARDIAC MARKERS ( 25 Jul 2018 03:41 )  .061 ng/mL / x     / 41 U/L / x     / x        CARDIAC MARKERS ( 24 Jul 2018 22:52 )  .040 ng/mL / x     / 45 U/L / x     / x            Sedimentation Rate, Erythrocyte: 39 mm/hr (07-26 @ 03:43)    Rheumatoid Factor Quant, Serum or Plasma: <10 IU/mL (07-24 @ 08:09)    Sedimentation Rate, Erythrocyte: 28 mm/hr (07-24 @ 06:54)      Vancomycin Level, Trough: 15.6 ug/mL (07-26 @ 04:07)      Amylase, Serum Total: 60 U/L (07-20 @ 02:15)    Lipase, Serum: 270 U/L (07-20 @ 02:15)        MICROBIOLOGY:  Specimen Source: .Sputum Sputum (07-25 @ 22:57)  Culture - Sputum . (07.25.18 @ 22:57)    Gram Stain:   Moderate polymorphonuclear leukocytes per low power field  Rare Squamous epithelial cells per low power field  No organisms seen per oil power field    Specimen Source: .Sputum Sputum        Specimen Source: .Sputum Sputum (07-22 @ 23:26)  Culture Results:   No growth at 48 hours (07-22 @ 23:26)          Radiology:   < from: Xray Chest 1 View-PORTABLE IMMEDIATE (07.25.18 @ 15:22) >  INTERPRETATION:  AP supine chest on July 25, 2018 at 3:03 PM. Patient is   respirator dependent.    Heart is likely enlarged.    Endotracheal tube andnasogastric tube again noted.    Significant congestive lung and pleural findings again noted.    These findings are similar to earlier study of the same day.    Present film shows a left jugular line inserted with the tip in the   superior vena caval area.    IMPRESSION: Persistent congestive findings. Left jugular line inserted.        Impression:  Slowly improving clinically now on present ab rx with Meropenem + Vanco + Diflucan for rx of Sepsis with Shock secondary to Lt Emphysematous Pyelonephritis with underlying Staghorn Calculus with associated Respiratory Failure, Multifocal PNA with congestion and now with improving LFT's and TBili.  S/p initial episode of Rapid  AFib, s/p cardioversion with return to RSR.    Suggestions:  Will continue current ab rx and follow-up temps and labs closely.  Await Sputum Cx results - noted Gm Stain with moderate PMN's now consistent with a purulent sputum.  Follow-up CXR.  Discussed with patient at bedside.

## 2018-07-27 LAB
ANION GAP SERPL CALC-SCNC: 8 MMOL/L — SIGNIFICANT CHANGE UP (ref 5–17)
BUN SERPL-MCNC: 16 MG/DL — SIGNIFICANT CHANGE UP (ref 7–23)
CALCIUM SERPL-MCNC: 8 MG/DL — LOW (ref 8.5–10.1)
CHLORIDE SERPL-SCNC: 106 MMOL/L — SIGNIFICANT CHANGE UP (ref 96–108)
CO2 SERPL-SCNC: 31 MMOL/L — SIGNIFICANT CHANGE UP (ref 22–31)
CREAT SERPL-MCNC: 0.5 MG/DL — SIGNIFICANT CHANGE UP (ref 0.5–1.3)
CULTURE RESULTS: SIGNIFICANT CHANGE UP
GLUCOSE SERPL-MCNC: 80 MG/DL — SIGNIFICANT CHANGE UP (ref 70–99)
GRAM STN FLD: SIGNIFICANT CHANGE UP
HCT VFR BLD CALC: 27.1 % — LOW (ref 34.5–45)
HGB BLD-MCNC: 8.5 G/DL — LOW (ref 11.5–15.5)
MAGNESIUM SERPL-MCNC: 1.8 MG/DL — SIGNIFICANT CHANGE UP (ref 1.6–2.6)
MCHC RBC-ENTMCNC: 29.1 PG — SIGNIFICANT CHANGE UP (ref 27–34)
MCHC RBC-ENTMCNC: 31.4 GM/DL — LOW (ref 32–36)
MCV RBC AUTO: 92.8 FL — SIGNIFICANT CHANGE UP (ref 80–100)
NRBC # BLD: 0 /100 WBCS — SIGNIFICANT CHANGE UP (ref 0–0)
PHOSPHATE SERPL-MCNC: 1.9 MG/DL — LOW (ref 2.5–4.5)
PLATELET # BLD AUTO: 123 K/UL — LOW (ref 150–400)
POTASSIUM SERPL-MCNC: 3.4 MMOL/L — LOW (ref 3.5–5.3)
POTASSIUM SERPL-SCNC: 3.4 MMOL/L — LOW (ref 3.5–5.3)
RBC # BLD: 2.92 M/UL — LOW (ref 3.8–5.2)
RBC # FLD: 24.5 % — HIGH (ref 10.3–14.5)
SODIUM SERPL-SCNC: 145 MMOL/L — SIGNIFICANT CHANGE UP (ref 135–145)
SPECIMEN SOURCE: SIGNIFICANT CHANGE UP
TSH SERPL-MCNC: 0.01 UIU/ML — LOW (ref 0.36–3.74)
WBC # BLD: 6.89 K/UL — SIGNIFICANT CHANGE UP (ref 3.8–10.5)
WBC # FLD AUTO: 6.89 K/UL — SIGNIFICANT CHANGE UP (ref 3.8–10.5)

## 2018-07-27 PROCEDURE — 71045 X-RAY EXAM CHEST 1 VIEW: CPT | Mod: 26

## 2018-07-27 PROCEDURE — 99232 SBSQ HOSP IP/OBS MODERATE 35: CPT

## 2018-07-27 RX ORDER — POTASSIUM PHOSPHATE, MONOBASIC POTASSIUM PHOSPHATE, DIBASIC 236; 224 MG/ML; MG/ML
15 INJECTION, SOLUTION INTRAVENOUS ONCE
Qty: 0 | Refills: 0 | Status: COMPLETED | OUTPATIENT
Start: 2018-07-27 | End: 2018-07-27

## 2018-07-27 RX ORDER — FUROSEMIDE 40 MG
40 TABLET ORAL ONCE
Qty: 0 | Refills: 0 | Status: COMPLETED | OUTPATIENT
Start: 2018-07-27 | End: 2018-07-27

## 2018-07-27 RX ADMIN — FENTANYL CITRATE 4.47 MICROGRAM(S)/KG/HR: 50 INJECTION INTRAVENOUS at 07:05

## 2018-07-27 RX ADMIN — Medication 250 MILLIGRAM(S): at 07:20

## 2018-07-27 RX ADMIN — CHLORHEXIDINE GLUCONATE 1 APPLICATION(S): 213 SOLUTION TOPICAL at 05:30

## 2018-07-27 RX ADMIN — MEROPENEM 100 MILLIGRAM(S): 1 INJECTION INTRAVENOUS at 07:05

## 2018-07-27 RX ADMIN — MEROPENEM 100 MILLIGRAM(S): 1 INJECTION INTRAVENOUS at 22:48

## 2018-07-27 RX ADMIN — PANTOPRAZOLE SODIUM 40 MILLIGRAM(S): 20 TABLET, DELAYED RELEASE ORAL at 12:17

## 2018-07-27 RX ADMIN — CHLORHEXIDINE GLUCONATE 15 MILLILITER(S): 213 SOLUTION TOPICAL at 07:05

## 2018-07-27 RX ADMIN — Medication 40 MILLIGRAM(S): at 12:54

## 2018-07-27 RX ADMIN — POTASSIUM PHOSPHATE, MONOBASIC POTASSIUM PHOSPHATE, DIBASIC 63.75 MILLIMOLE(S): 236; 224 INJECTION, SOLUTION INTRAVENOUS at 09:09

## 2018-07-27 RX ADMIN — CHLORHEXIDINE GLUCONATE 15 MILLILITER(S): 213 SOLUTION TOPICAL at 17:44

## 2018-07-27 RX ADMIN — Medication 250 MILLIGRAM(S): at 17:44

## 2018-07-27 RX ADMIN — MEROPENEM 100 MILLIGRAM(S): 1 INJECTION INTRAVENOUS at 13:30
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
## 2018-07-27 NOTE — PROGRESS NOTE ADULT - ASSESSMENT
53yo lady who presented to the ER in respiratory distress / metabolic acidosis / hepatic encephalopathy / hepatic failure.  Intubated after a ?PEA arrest s/p DCCV for Afib with RVR.  + septic shock due to emphysematous pyelonephritis with stag horn calculous in left kidney  Echo: LVEF 30%, RV enlargement, mod MR  Extubated but reintubated 2/2 acute pulm edema.    Suggest:  -cont abx for septic shock  -HRs currently 80s; unable to start any medications for rate control 2/2 hypotension requiring pressors; became mitul with dig.  -supportive care  -replete lytes  -monitor creat/LFTs  -would diurese further today; extubate when pulm status stable as per ICU team  -no acute intervention as per urology; medical therapy/abx  -repeat 2D echo prior to d/c; cardiomyopathy likely 2/2 septic shock 53yo lady who presented to the ER in respiratory distress / metabolic acidosis / hepatic encephalopathy / hepatic failure.  Intubated after a ?PEA arrest s/p DCCV for Afib with RVR.  + septic shock due to emphysematous pyelonephritis with stag horn calculous in left kidney  Echo: LVEF 30%, RV enlargement, mod MR  Extubated but reintubated 2/2 acute pulm edema.    Suggest:  -cont abx for septic shock  -HRs currently 80s; unable to start any medications for rate control 2/2 hypotension requiring pressors; became mitul with dig.  -supportive care  -replete lytes  -monitor creat/LFTs  -remains quite edematous , agree with continued aggressive diuresis today; extubate when pulm status stable as per ICU team  -no acute intervention as per urology; medical therapy/abx  -repeat 2D echo prior to d/c; cardiomyopathy likely 2/2 septic shock

## 2018-07-27 NOTE — CHART NOTE - NSCHARTNOTEFT_GEN_A_CORE
Pt w/ septic shock    Factors impacting intake: [ ] none [ ] nausea  [ ] vomiting [ ] diarrhea [ ] constipation  [ ]chewing problems [ ] swallowing issues  [x ] other: dificulty swallowing    Diet Presciption: Diet, NPO with Tube Feed:   Tube Feeding Modality: Nasogastric  Vital AF  Total Volume for 24 Hours (mL): 1320  Continuous  Starting Tube Feed Rate {mL per Hour}: 30  Increase Tube Feed Rate by (mL): 10     Every 6 hours  Until Goal Tube Feed Rate (mL per Hour): 55  Tube Feed Duration (in Hours): 24  Tube Feed Start Time: 13:00 (07-25-18 @ 12:47)    Intake: Vital AF @ 55ml/hr = 1320 ml, 1584 kcal & 99 g pro    Current Weight:   % Weight Change    Pertinent Medications: MEDICATIONS  (STANDING):  chlorhexidine 0.12% Liquid 15 milliLiter(s) Swish and Spit two times a day  chlorhexidine 4% Liquid 1 Application(s) Topical <User Schedule>  fentaNYL   Infusion. 0.5 MICROgram(s)/kG/Hr (4.475 mL/Hr) IV Continuous <Continuous>  heparin  Injectable 5000 Unit(s) SubCutaneous every 8 hours  lidocaine 1% (Preservative-free) Injectable 20 milliLiter(s) Local Injection once  meropenem  IVPB 1000 milliGRAM(s) IV Intermittent every 8 hours  meropenem  IVPB      pantoprazole   Suspension 40 milliGRAM(s) Oral daily  vancomycin  IVPB      vancomycin  IVPB 750 milliGRAM(s) IV Intermittent every 12 hours    MEDICATIONS  (PRN):    Pertinent Labs: 07-27 Na145 mmol/L Glu 80 mg/dL K+ 3.4 mmol/L<L> Cr  0.50 mg/dL BUN 16 mg/dL 07-27 Phos 1.9 mg/dL<L> 07-26 Alb 1.6 g/dL<L> 07-20 [id number] 5.0 %     CAPILLARY BLOOD GLUCOSE        Skin:     Estimated Needs:   [ ] no change since previous assessment  [ ] recalculated:     Previous Nutrition Diagnosis:   [ ] Inadequate Energy Intake [ ]Inadequate Oral Intake [ ] Excessive Energy Intake   [ ] Underweight [ ] Increased Nutrient Needs [ ] Overweight/Obesity   [ ] Altered GI Function [ ] Unintended Weight Loss [ ] Food & Nutrition Related Knowledge Deficit [ ] Malnutrition     Previous Goal:    Nutrition Diagnosis is [ ] ongoing  [ ] resolved [ ] not applicable     New Nutrition Diagnosis: [ ] not applicable       Interventions:   Recommend  [ ] Change Diet To:  [ ] Nutrition Supplement  [ ] Nutrition Support  [ ] Other:     Monitoring and Evaluation:   [ ] PO intake [ x ] Tolerance to diet prescription [ x ] weights [ x ] labs[ x ] follow up per protocol  [ ] other: Pt w/ septic shock.  Pt failed weaning trial this morning.     Factors impacting intake: [ ] none [ ] nausea  [ ] vomiting [ ] diarrhea [ ] constipation  [ ]chewing problems [ ] swallowing issues  [x ] other: difficulty swallowing.     Diet Prescription: Diet, NPO with Tube Feed:   Tube Feeding Modality: Nasogastric  Vital AF  Total Volume for 24 Hours (mL): 1320  Continuous  Starting Tube Feed Rate {mL per Hour}: 30  Increase Tube Feed Rate by (mL): 10     Every 6 hours  Until Goal Tube Feed Rate (mL per Hour): 55  Tube Feed Duration (in Hours): 24  Tube Feed Start Time: 13:00 (07-25-18 @ 12:47)    Intake: Vital AF @ 55ml/hr = 1320 ml, 1584 kcal & 99 g pro. No residual. 7/27 - RN decreased TF to 50 ml/hr = 1200 ml, 1440 kcal & 90 g pro. Meeting pt's estimated nutritional needs. + additional calories from fentanyl = 19.6.    Current Weight: 7/26 - 198.4 (90 kg) 3+ generalized edema, 4+ (R & L) arm & Hand ; 7/19 - 197.3 (89.5 kg) 3+ generalized edema (L& R) Leg, Ankle, Foot  % Weight Change - < 1% gain x 7 days w/ noted edema.       Pertinent Medications: MEDICATIONS  (STANDING):  chlorhexidine 0.12% Liquid 15 milliLiter(s) Swish and Spit two times a day  chlorhexidine 4% Liquid 1 Application(s) Topical <User Schedule>  fentaNYL   Infusion. 0.5 MICROgram(s)/kG/Hr (4.475 mL/Hr) IV Continuous <Continuous>  heparin  Injectable 5000 Unit(s) SubCutaneous every 8 hours  lidocaine 1% (Preservative-free) Injectable 20 milliLiter(s) Local Injection once  meropenem  IVPB 1000 milliGRAM(s) IV Intermittent every 8 hours  meropenem  IVPB      pantoprazole   Suspension 40 milliGRAM(s) Oral daily  vancomycin  IVPB      vancomycin  IVPB 750 milliGRAM(s) IV Intermittent every 12 hours    MEDICATIONS  (PRN):    Pertinent Labs: 07-27 Na145 mmol/L Glu 80 mg/dL K+ 3.4 mmol/L<L> Cr  0.50 mg/dL BUN 16 mg/dL 07-27 Phos 1.9 mg/dL<L> 07-26 Alb 1.6 g/dL<L> 07-20 [id number] 5.0 %     7/26 - CRP - 9.21   CAPILLARY BLOOD GLUCOSE        Skin: sacrum stage ll    Estimated Needs:   [x ] no change since previous assessment- 7/20/18  [ ] recalculated:     Previous Nutrition Diagnosis:   [x ] Inadequate Energy Intake [ ]Inadequate Oral Intake [ ] Excessive Energy Intake   [ ] Underweight [ ] Increased Nutrient Needs [ ] Overweight/Obesity   [ ] Altered GI Function [ ] Unintended Weight Loss [ ] Food & Nutrition Related Knowledge Deficit [ ] Malnutrition     Previous Goal: Pt to Meet calorie and protein needs > 75% energy & protein via TF & tolerate TF without GI distress.  Goal Met  Nutrition Diagnosis is [ ] ongoing  [x ] resolved [ ] not applicable     New Nutrition Diagnosis: [x ] not applicable       Interventions:   Recommend  [ ] Change Diet To:  [ ] Nutrition Supplement  [ ] Nutrition Support  [ ] Other:     Monitoring and Evaluation:   [ ] PO intake [ x ] Tolerance to diet prescription [ x ] weights [ x ] labs[ x ] follow up per protocol  [ ] other: Pt w/ septic shock.  Pt failed weaning trial this morning.     Factors impacting intake: [ ] none [ ] nausea  [ ] vomiting [ ] diarrhea [ ] constipation  [ ]chewing problems [ ] swallowing issues  [x ] other: difficulty swallowing.     Diet Prescription: Diet, NPO with Tube Feed:   Tube Feeding Modality: Nasogastric  Vital AF  Total Volume for 24 Hours (mL): 1320  Continuous  Starting Tube Feed Rate {mL per Hour}: 30  Increase Tube Feed Rate by (mL): 10     Every 6 hours  Until Goal Tube Feed Rate (mL per Hour): 55  Tube Feed Duration (in Hours): 24  Tube Feed Start Time: 13:00 (07-25-18 @ 12:47)    Intake: Vital AF @ 55ml/hr = 1320 ml, 1584 kcal & 99 g pro. No residual. 7/27 - RN decreased TF to 50 ml/hr = 1200 ml, 1440 kcal & 90 g pro. Meeting pt's estimated nutritional needs. + additional calories from fentanyl = 19.6.    Current Weight: 7/26 - 198.4 (90 kg) 3+ generalized edema, 4+ (R & L) arm & Hand ; 7/19 - 197.3 (89.5 kg) 3+ generalized edema (L& R) Leg, Ankle, Foot  % Weight Change - < 1% gain x 7 days w/ noted edema.    *** Unable to do NFPE due to 3+ generalized edema & 4+ L & R) Arm & Hand. Pt also w/ ET tube.     Pertinent Medications: MEDICATIONS  (STANDING):  chlorhexidine 0.12% Liquid 15 milliLiter(s) Swish and Spit two times a day  chlorhexidine 4% Liquid 1 Application(s) Topical <User Schedule>  fentaNYL   Infusion. 0.5 MICROgram(s)/kG/Hr (4.475 mL/Hr) IV Continuous <Continuous>  heparin  Injectable 5000 Unit(s) SubCutaneous every 8 hours  lidocaine 1% (Preservative-free) Injectable 20 milliLiter(s) Local Injection once  meropenem  IVPB 1000 milliGRAM(s) IV Intermittent every 8 hours  meropenem  IVPB      pantoprazole   Suspension 40 milliGRAM(s) Oral daily  vancomycin  IVPB      vancomycin  IVPB 750 milliGRAM(s) IV Intermittent every 12 hours    MEDICATIONS  (PRN):    Pertinent Labs: 07-27 Na145 mmol/L Glu 80 mg/dL K+ 3.4 mmol/L<L> Cr  0.50 mg/dL BUN 16 mg/dL 07-27 Phos 1.9 mg/dL<L> 07-26 Alb 1.6 g/dL<L> 07-20 [id number] 5.0 %     7/26 - CRP - 9.21   CAPILLARY BLOOD GLUCOSE        Skin: sacrum stage ll    Estimated Needs:   [x ] no change since previous assessment- 7/20/18  [ ] recalculated:     Previous Nutrition Diagnosis:   [x ] Inadequate Energy Intake [ ]Inadequate Oral Intake [ ] Excessive Energy Intake   [ ] Underweight [ ] Increased Nutrient Needs [ ] Overweight/Obesity   [ ] Altered GI Function [ ] Unintended Weight Loss [ ] Food & Nutrition Related Knowledge Deficit [ ] Malnutrition     Previous Goal: Pt to Meet calorie and protein needs > 75% energy & protein via TF & tolerate TF without GI distress.  Goal Met  Nutrition Diagnosis is [ ] ongoing  [x ] resolved [ ] not applicable     New Nutrition Diagnosis: [x ] not applicable       Interventions: Continue Vital AF @ 50 ml/hr (goal) = 1200 ml,1440 kcal & 90 g pro.   Recommend  [ ] Change Diet To:  [ ] Nutrition Supplement  [ ] Nutrition Support  [ ] Other:     Monitoring and Evaluation:   [ ] PO intake [ x ] Tolerance to diet prescription [ x ] weights [ x ] labs[ x ] follow up per protocol  [ ] other:

## 2018-07-27 NOTE — PROGRESS NOTE ADULT - SUBJECTIVE AND OBJECTIVE BOX
55yo Female who presented to the ER in respiratory distress / metabolic acidosis / hepatic encephalopathy / hepatic failure.  Intubated after a ?PEA arrest s/p DCCV for Afib with RVR.  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 Remains intubated, no other events.    MEDICATIONS  (STANDING):  chlorhexidine 0.12% Liquid 15 milliLiter(s) Swish and Spit two times a day  chlorhexidine 4% Liquid 1 Application(s) Topical <User Schedule>  fentaNYL   Infusion. 0.5 MICROgram(s)/kG/Hr (4.475 mL/Hr) IV Continuous <Continuous>  heparin  Injectable 5000 Unit(s) SubCutaneous every 8 hours  lidocaine 1% (Preservative-free) Injectable 20 milliLiter(s) Local Injection once  meropenem  IVPB 1000 milliGRAM(s) IV Intermittent every 8 hours  meropenem  IVPB      norepinephrine Infusion 0.05 MICROgram(s)/kG/Min (8.391 mL/Hr) IV Continuous <Continuous>  pantoprazole   Suspension 40 milliGRAM(s) Oral daily  propofol Infusion 10 MICROgram(s)/kG/Min (5.37 mL/Hr) IV Continuous <Continuous>  vancomycin  IVPB      vancomycin  IVPB 750 milliGRAM(s) IV Intermittent every 12 hours    MEDICATIONS  (PRN):      PHYSICAL EXAMINATION:  -----------------------------  ICU Vital Signs Last 24 Hrs  T(C): 37.1 (27 Jul 2018 07:14), Max: 37.3 (26 Jul 2018 15:41)  T(F): 98.7 (27 Jul 2018 07:14), Max: 99.2 (26 Jul 2018 15:41)  HR: 83 (27 Jul 2018 10:00) (51 - 87)  BP: 133/89 (27 Jul 2018 10:00) (86/47 - 133/89)  BP(mean): 104 (27 Jul 2018 10:00) (57 - 105)  ABP: --  ABP(mean): --  RR: 24 (27 Jul 2018 10:00) (0 - 24)  SpO2: 100% (27 Jul 2018 10:00) (65% - 100%)      Constitutional: intubated  Eyes: the conjunctiva exhibited no abnormalities and the eyelids demonstrated no xanthelasmas.   HEENT: normal oral mucosa, no oral pallor and no oral cyanosis.   Neck: normal jugular venous A waves present, normal jugular venous V waves present and no jugular venous mcknight A waves.   Pulmonary: no respiratory distress on vent support; decreased BS at bases with mild rales  Cardiovascular: irreg irreg; 2/6 SM  Abdomen: soft, non-tender, no hepato-splenomegaly  Musculoskeletal: the gait could not be assessed.  Extremities: no clubbing of the fingernails, no localized cyanosis  Skin: normal skin color and pigmentation, no rash, no venous stasis    LABS:   --------                        8.5    6.89  )-----------( 123      ( 27 Jul 2018 04:26 )             27.1                           9.2    7.35  )-----------( 120      ( 26 Jul 2018 03:43 )             29.3     07-27    145  |  106  |  16  ----------------------------<  80  3.4<L>   |  31  |  0.50    Ca    8.0<L>      27 Jul 2018 04:26  Phos  1.9     07-27  Mg     1.8     07-27    TPro  5.9<L>  /  Alb  1.6<L>  /  TBili  3.0<H>  /  DBili  2.58<H>  /  AST  33  /  ALT  34  /  AlkPhos  93  07-26 07-26    144  |  107  |  13  ----------------------------<  82  3.5   |  29  |  0.51    Ca    8.1<L>      26 Jul 2018 03:43  Phos  2.2     07-26  Mg     1.6     07-26    TPro  5.9<L>  /  Alb  1.6<L>  /  TBili  3.0<H>  /  DBili  2.58<H>  /  AST  33  /  ALT  34  /  AlkPhos  93  07-26          INTERPRETATION:  CT of the chest, abdomen, and pelvis without contrast    Indication: Shock. Respiratory failure. Jaundice. Elevated bilirubin.    Technique: Axial multidetector CT images of the chest, abdomen, and   pelvis are acquired without IV or oral contrast.    Comparison: None.    Findings:    Chest: Mild bilateral pleural effusions. Small right pericardial   effusion. Cardiomegaly. No aortic aneurysm. Allowing for the noncontrast   technique, there is no grossly enlarged mediastinal, hilar, or axillary   lymph node.    ET tube terminates above the nivia. NG tube terminates in the stomach.    Small densities in the right mainstem bronchus may represent mucous   material.    Mild emphysematous changes in the right lung, suggestive of COPD. Small   bilateral atelectasis. Mild hazy groundglass densities in both lungs may   represent pulmonary edema or pneumonia. No evidence for pneumothorax.    Abdomen: Evaluation of the abdomen and pelvis is limited by lack of IV   and oral contrast. Allowing for noncontrast technique, the liver,   pancreas, spleen, and the right kidney appear grossly unremarkable. There   is a staghorn calculus in the left kidney. Apparent air in the left renal   calyces. No hydronephrosis.    Nonspecific adrenal thickening bilaterally.    The gallbladder appears contracted. Pericholecystic fluid versus   gallbladder wall edema. The common bile duct is not visualized on this   limited examination. If clinically indicated, abdominal ultrasound may be   pursued for further evaluation.    No bowel obstruction, or grossly thickened bowel wall. The appendix is   not identified. No evidence free air, or enlarged lymph node. Mild to   moderate ascites in the abdomen and pelvis.    Pelvis: There is a Lyons catheter in the urinary bladder which contains   air. The urinary bladder is underdistended, rendering evaluation   difficult. The bowel structures appear grossly unremarkable. No grossly   enlarged pelvic lymph node.    There is diffuse body wall edema in the chest, abdomen, and pelvis.    Impression: Anasarca.    Mild bilateral pleural effusions. Small right pericardial effusion.    Small densities in the right mainstem bronchus may represent mucous   material. Follow-up chest CT may be pursued in 4-6 weeks to ensure   clearance.    Mild hazy groundglass densities in both lungs may represent pulmonary   edema or pneumonia. Clinical correlation is recommended.    The gallbladder appears contracted. Pericholecystic fluid versus   gallbladder wall edema. The common bile duct is not visualized on this   limited examination. If clinically indicated, abdominal ultrasound may be   pursued for further evaluation. Alternatively, abdominal MR without and   with IV contrast including MRCP may be pursued.    No bowel obstruction, or grossly thickened bowel wall. The appendix is   not identified.    Mild to moderate ascites in the abdomen and pelvis.    Lyons catheter in the urinary bladder. Associated air in the urinary   bladder. Staghorn calculus in the left kidney. Apparent air in the left   renal calyces may be due to reflux from the urinary bladder or may   represent emphysematous pyelitis. Clinical correlation is recommended.    < end of copied text >    < from: US Duplex Venous Lower Ext Complete, Bilateral (07.19.18 @ 16:51) >  IMPRESSION:     No evidence of bilateral lower extremity deep venous thrombosis.    < end of copied text >        < from: TTE Echo Doppler w/o Cont (07.19.18 @ 19:57) >   1. Left ventricular ejection fraction, by visual estimation, is 30 to   35%.   2. There is no left ventricular hypertrophy.   3. Biatrial enlargement.   4. Right ventricular dilatation.   5. Moderate mitral valve regurgitation.   6. Mild tricuspid regurgitation.   7. Pulmonic valve regurgitation.   8. Moderately to severely decreased global left ventricular systolic   function.      < end of copied text >

## 2018-07-27 NOTE — PROGRESS NOTE ADULT - ASSESSMENT
HPI:  See H and P for full details.  See in  ED noted to be in respiratory distress on Bipap with metabolic acidosis.  Given 2 amps bicarb and bicarb drip for metabolic acidosis, lactate 12. Spoke to her at length, she does not doctor has not seen and MD in many years. She was AAand 0 x 4 knows who the president is in spite of her hepatic encephalopathy, Denies ETOH/drugs/Hx of HIV or liver disease. Noted to be in fulminant hepatic failure, t bili 10.7, INR 2.76, AST 84  . Denies Tylenol consumption or any toxic ingestion. Post cardioversion for Afib with RVR earlier today, reportedly lost a pulse. In ICU, made decision to intubate . premedicated with 4 mg mversed, 100 mcg fent, started on propofol at 40. Intubated by myself on first attempt with glidescope 7.5 ETT to lip 21, placed RIJ TLC on first attempt. Bedside echo with RV dilation, severe TR, septal bowing, LV EF appears normal to slighlty decreased, I got LVOT VTI of 10 possibly indicating a component of LV failure that to me seems secondary to a primary RV proscess, possibly portopulmonary HTN. Basically my question is: is this a chronic liver picture causing portopulmonary HTN and RV failure or is this a primary RV failure causing hepatic congestion. Will check formal echo, fractionate the bilis, send HIV, hepatitis and autoimmune work up. Will treat the fulminant hepatic failure with N-acetylcycteine protocol which has good evidence behind it for all forms of fulminant hepatic failure, f/u a tylenol level, add lactulose for the hyperammonemia,Aztrenaom flagyl for intrabdominal proscess in setting of PCN allergey with unknown reaction, check urine electrolytes to R/O hepatorenal syndrome, levophed to keep MAP > 65 mmhg, Check formal echo, trend cardiac enzymes. GI, renal, cards consults. C/W bicarb drip for now is making some urine. Non contrast CT H/C/A/P to evaluate for sources of sepsis.  Prognosis is guarded. She told me she has a daughter named Osvaldo in [place] who she wishes to be her HCP, thou sounds like she may be sestranged from this daughter.  CCM time initial 46 min plus another 1 hour, total 1 hr and 46 minuted included recieving patient from ER, preparation for intubation  intubation, central line placement bedside echo.  ----------------------- as Above --------------------------------------------------------------------------------------------------  Np history obtainable besides above. Intubated unresponsive. Notes reviewed. See Ct scan / sonogram. Bilirubin shows improvement over a short period of time  ---------------- Elevated Bili > transaminases/alk phos  - [place] to acute on chronic liver disease , VS side effects of medications. 1) supportive care  2) f/u LFTs 3) work up for underlying liver disease

## 2018-07-27 NOTE — PROGRESS NOTE ADULT - SUBJECTIVE AND OBJECTIVE BOX
Patient seen and examined bedside resting comfortably in CCU, intubated and sedated.  No acute overnight event.     T(F): 98.7 (07-27-18 @ 00:02), Max: 100.4 (07-26-18 @ 10:30)  HR: 57 (07-27-18 @ 04:30) (51 - 112)  BP: 93/68 (07-27-18 @ 04:30) (84/61 - 133/87)  RR: 18 (07-27-18 @ 04:30) (0 - 24)  SpO2: 100% (07-27-18 @ 04:30) (65% - 100%)    PHYSICAL EXAM:    General: NAD, intubated and sedated  HEENT: NCAT, ET tube in position.   Chest: mechanical breath sounds b/l  Abdomen: soft, NT/ND.   Extremities: Calf soft, nontender b/l.   : Nova draining clear yellow urine (output 1150cc/24hr)    LABS:                        8.5    6.89  )-----------( 123      ( 27 Jul 2018 04:26 )             27.1   07-26    144  |  107  |  13  ----------------------------<  82  3.5   |  29  |  0.51    Ca    8.1<L>      26 Jul 2018 03:43  Phos  2.2     07-26  Mg     1.6     07-26    TPro  5.9<L>  /  Alb  1.6<L>  /  TBili  3.0<H>  /  DBili  2.58<H>  /  AST  33  /  ALT  34  /  AlkPhos  93  07-26    I&O's Detail    25 Jul 2018 07:01  -  26 Jul 2018 07:00  --------------------------------------------------------  IN:    fentaNYL Infusion.: 107.4 mL    norepinephrine Infusion: 112.8 mL    propofol Infusion: 385.8 mL    Solution: 100 mL    Solution: 100 mL    Solution: 500 mL    Solution: 250 mL    Vital HN: 720 mL  Total IN: 2276 mL    OUT:    Indwelling Catheter - Urethral: 3600 mL    Rectal Tube: 50 mL  Total OUT: 3650 mL    Total NET: -1374 mL      26 Jul 2018 07:01  -  27 Jul 2018 05:07  --------------------------------------------------------  IN:    fentaNYL Infusion.: 17.9 mL    norepinephrine Infusion: 6.8 mL    Solution: 50 mL    Solution: 250 mL    Vital HN: 600 mL  Total IN: 924.7 mL    OUT:    Indwelling Catheter - Urethral: 1150 mL  Total OUT: 1150 mL    Total NET: -225.3 mL    Assessment: 53yo Female admitted with septic shock due to Left emphysematous pyelonephritis, respiratory failure, found with staghorn calculous of Left kidney on CT. Urine culture 7/19 with + e coli, klebsiella, CNS and enterococcus faecalis. Patient re-intubated for respiratory distress and hypoxia.     Plan:  Continue primary medical management per ICU team, wean as tolerated  continue Antibiotics per ID  continue nova, monitor urine output  As discussed with Dr Frances. No surgical intervention planned at present time. Recommend CT scan of abdomen/pelvis to follow up left emphysematous pyelonephritis
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
## 2018-07-27 NOTE — PROGRESS NOTE ADULT - PROBLEM SELECTOR PLAN 1
Bili 3.0  [id number]- continues to improve.   No signs of hemochromatosis. MAZIN  1 : 320 but biliruben improving without any direct treatment.  - Supportive care for now ( follow up labs ordered )

## 2018-07-27 NOTE — PROGRESS NOTE ADULT - ASSESSMENT
pt seen and examined with ICU team on 7/27    54F poor historian with unknown PMH presents from home after being on ground and unable to get up per report. EMS found pt on ground unable to obtain BP with monitor strip showing A-fib RVR. Pt cardioverted and converted to sinus rhythm. Also had FS 26 treated with dextrose. Admitted to ICU for severe sepsis with septic shock, UTI, emphysematous pyelonephritis, L staghorn calculi, ARF with ATN, acute respiratory failure requiring intubation in ICU 7/19, Extubated 7/23. Reintubated for hypoxic respiratory failure due to acute pulmonary edema, acute systolic heart failure, acute liver failure, hyperbilirubinemia, a-fib RVR, hypoglycemia, acute metabolic/hepatic encephalopathy., severe metabolic acidosis.     1. NEURO  - encephalopathy resolved  - arousable to follow commands  - daily sedation vacation    2. PULM  - cont daily wean as tolerates  - failed wean today due to tachypnea  - will cont to diurese pt for pulmonary edema, volume overload    3. CV  - weaned off levophed  - bradycardia likely in setting of sedatives  - will cont to monitor HR  - follow up TSH/T3/T4 to r/o underlying thyroid disease causing rapid/slow HR  - will cont to diurese with lasix for volume overload, will give zaroxolyn today as well for aggressive diruesis  - unclear if pt with hx of CHF vs cardiomyopathy due to underlying sepsis  - cardiology follow up    4. GI  - hyperbilirubinemia improving as well as LFTs  - cause of liver function abnormalities can be due to congestive hepatopathy vs underlying septic shock  - will cont to monitor  - pt on admission did receive NAC treatment in setting of liver failure and unknown hx, U tox negative hepatitis panel negative    5. RENAL  - ARF with ATN due to septic shock  - metabolic acidosis resolved  - nova draining urine, good urine output with diuresis  - will cont to monitor  - supplement hypokalemia and hypophosphatemia    6. ID  - septic shock due to emphysematous pyelonephritis  - u cx growing klebsiella, enterococcus, and e-coli  - cont with vanco and meropenem  - pt has been on fluconazole since admission 7/19, d/jovita yesterday as pt received 1 week of fungal coverage already  - ID follow up    7. ENDO  - no further hypoglycemia  - cont NGT feeds    8. GEN  - no family has been at bedside  - will need social work to locate family  - early mobilization as tolerates    Critical Care Time: 45 min

## 2018-07-28 LAB
ANION GAP SERPL CALC-SCNC: 9 MMOL/L — SIGNIFICANT CHANGE UP (ref 5–17)
BUN SERPL-MCNC: 16 MG/DL — SIGNIFICANT CHANGE UP (ref 7–23)
CALCIUM SERPL-MCNC: 8.1 MG/DL — LOW (ref 8.5–10.1)
CHLORIDE SERPL-SCNC: 105 MMOL/L — SIGNIFICANT CHANGE UP (ref 96–108)
CO2 SERPL-SCNC: 31 MMOL/L — SIGNIFICANT CHANGE UP (ref 22–31)
CREAT SERPL-MCNC: 0.56 MG/DL — SIGNIFICANT CHANGE UP (ref 0.5–1.3)
GLUCOSE SERPL-MCNC: 80 MG/DL — SIGNIFICANT CHANGE UP (ref 70–99)
HCT VFR BLD CALC: 28.5 % — LOW (ref 34.5–45)
HGB BLD-MCNC: 9 G/DL — LOW (ref 11.5–15.5)
MAGNESIUM SERPL-MCNC: 1.7 MG/DL — SIGNIFICANT CHANGE UP (ref 1.6–2.6)
MCHC RBC-ENTMCNC: 29.1 PG — SIGNIFICANT CHANGE UP (ref 27–34)
MCHC RBC-ENTMCNC: 31.6 GM/DL — LOW (ref 32–36)
MCV RBC AUTO: 92.2 FL — SIGNIFICANT CHANGE UP (ref 80–100)
NRBC # BLD: 0 /100 WBCS — SIGNIFICANT CHANGE UP (ref 0–0)
PHOSPHATE SERPL-MCNC: 2.7 MG/DL — SIGNIFICANT CHANGE UP (ref 2.5–4.5)
PLATELET # BLD AUTO: 148 K/UL — LOW (ref 150–400)
POTASSIUM SERPL-MCNC: 3.4 MMOL/L — LOW (ref 3.5–5.3)
POTASSIUM SERPL-SCNC: 3.4 MMOL/L — LOW (ref 3.5–5.3)
RBC # BLD: 3.09 M/UL — LOW (ref 3.8–5.2)
RBC # FLD: 24 % — HIGH (ref 10.3–14.5)
SODIUM SERPL-SCNC: 145 MMOL/L — SIGNIFICANT CHANGE UP (ref 135–145)
T3 SERPL-MCNC: 90 NG/DL — SIGNIFICANT CHANGE UP (ref 80–200)
T4 AB SER-ACNC: 5.2 UG/DL — SIGNIFICANT CHANGE UP (ref 4.6–12)
VANCOMYCIN TROUGH SERPL-MCNC: 17.6 UG/ML — SIGNIFICANT CHANGE UP (ref 10–20)
WBC # BLD: 7.23 K/UL — SIGNIFICANT CHANGE UP (ref 3.8–10.5)
WBC # FLD AUTO: 7.23 K/UL — SIGNIFICANT CHANGE UP (ref 3.8–10.5)

## 2018-07-28 PROCEDURE — 71045 X-RAY EXAM CHEST 1 VIEW: CPT | Mod: 26

## 2018-07-28 PROCEDURE — 99232 SBSQ HOSP IP/OBS MODERATE 35: CPT

## 2018-07-28 RX ORDER — FUROSEMIDE 40 MG
40 TABLET ORAL ONCE
Qty: 0 | Refills: 0 | Status: COMPLETED | OUTPATIENT
Start: 2018-07-28 | End: 2018-07-28

## 2018-07-28 RX ORDER — POTASSIUM CHLORIDE 20 MEQ
20 PACKET (EA) ORAL ONCE
Qty: 0 | Refills: 0 | Status: COMPLETED | OUTPATIENT
Start: 2018-07-28 | End: 2018-07-28

## 2018-07-28 RX ORDER — SODIUM CHLORIDE 9 MG/ML
1000 INJECTION INTRAMUSCULAR; INTRAVENOUS; SUBCUTANEOUS ONCE
Qty: 0 | Refills: 0 | Status: COMPLETED | OUTPATIENT
Start: 2018-07-28 | End: 2018-07-28

## 2018-07-28 RX ORDER — POTASSIUM CHLORIDE 20 MEQ
10 PACKET (EA) ORAL ONCE
Qty: 0 | Refills: 0 | Status: COMPLETED | OUTPATIENT
Start: 2018-07-28 | End: 2018-07-28

## 2018-07-28 RX ORDER — NOREPINEPHRINE BITARTRATE/D5W 8 MG/250ML
0.05 PLASTIC BAG, INJECTION (ML) INTRAVENOUS
Qty: 8 | Refills: 0 | Status: DISCONTINUED | OUTPATIENT
Start: 2018-07-28 | End: 2018-07-31

## 2018-07-28 RX ADMIN — Medication 40 MILLIGRAM(S): at 13:30

## 2018-07-28 RX ADMIN — Medication 20 MILLIEQUIVALENT(S): at 12:43

## 2018-07-28 RX ADMIN — MEROPENEM 100 MILLIGRAM(S): 1 INJECTION INTRAVENOUS at 22:34

## 2018-07-28 RX ADMIN — Medication 8.39 MICROGRAM(S)/KG/MIN: at 10:02

## 2018-07-28 RX ADMIN — SODIUM CHLORIDE 2000 MILLILITER(S): 9 INJECTION INTRAMUSCULAR; INTRAVENOUS; SUBCUTANEOUS at 07:43

## 2018-07-28 RX ADMIN — Medication 250 MILLIGRAM(S): at 06:00

## 2018-07-28 RX ADMIN — FENTANYL CITRATE 4.47 MICROGRAM(S)/KG/HR: 50 INJECTION INTRAVENOUS at 11:00

## 2018-07-28 RX ADMIN — Medication 8.39 MICROGRAM(S)/KG/MIN: at 10:05

## 2018-07-28 RX ADMIN — MEROPENEM 100 MILLIGRAM(S): 1 INJECTION INTRAVENOUS at 13:10

## 2018-07-28 RX ADMIN — FENTANYL CITRATE 4.47 MICROGRAM(S)/KG/HR: 50 INJECTION INTRAVENOUS at 22:33

## 2018-07-28 RX ADMIN — MEROPENEM 100 MILLIGRAM(S): 1 INJECTION INTRAVENOUS at 06:00

## 2018-07-28 RX ADMIN — CHLORHEXIDINE GLUCONATE 1 APPLICATION(S): 213 SOLUTION TOPICAL at 06:00

## 2018-07-28 RX ADMIN — Medication 100 MILLIEQUIVALENT(S): at 11:30

## 2018-07-28 RX ADMIN — Medication 250 MILLIGRAM(S): at 18:11

## 2018-07-28 RX ADMIN — CHLORHEXIDINE GLUCONATE 15 MILLILITER(S): 213 SOLUTION TOPICAL at 18:10

## 2018-07-28 RX ADMIN — HEPARIN SODIUM 5000 UNIT(S): 5000 INJECTION INTRAVENOUS; SUBCUTANEOUS at 22:34

## 2018-07-28 RX ADMIN — CHLORHEXIDINE GLUCONATE 15 MILLILITER(S): 213 SOLUTION TOPICAL at 06:00

## 2018-07-28 RX ADMIN — PANTOPRAZOLE SODIUM 40 MILLIGRAM(S): 20 TABLET, DELAYED RELEASE ORAL at 12:42
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
## 2018-07-28 NOTE — PROGRESS NOTE ADULT - ASSESSMENT
53yo lady who presented to the ER in respiratory distress / metabolic acidosis / hepatic encephalopathy / hepatic failure.  Intubated after a ?PEA arrest s/p DCCV for Afib with RVR.  + septic shock due to emphysematous pyelonephritis with stag horn calculous in left kidney  Echo: LVEF 30%, RV enlargement, mod MR  Extubated but reintubated 2/2 acute pulm edema yesterday.  Not tolerating weaning trial today.. tachy and hypotensive.      -cont abx for septic shock  -HRs currently 80s; unable to start any medications for rate control 2/2 hypotension requiring pressors; became mitul with dig.  -supportive care  -replete lytes  -monitor creat/LFTs  -remains quite edematous , agree with continued aggressive diuresis today; extubate when pulm status stable as per ICU team  -no acute intervention as per urology; medical therapy/abx  -repeat 2D echo prior to d/c; cardiomyopathy likely 2/2 septic shock

## 2018-07-28 NOTE — PROGRESS NOTE ADULT - SUBJECTIVE AND OBJECTIVE BOX
53yo Female who presented to the ER in respiratory distress / metabolic acidosis / hepatic encephalopathy / hepatic failure.  Intubated after a ?PEA arrest s/p DCCV for Afib with RVR.  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  This AM did not tolerate weaning trial; tachy and hypotensive again. Levo re-started.    MEDICATIONS  (STANDING):  chlorhexidine 0.12% Liquid 15 milliLiter(s) Swish and Spit two times a day  chlorhexidine 4% Liquid 1 Application(s) Topical <User Schedule>  fentaNYL   Infusion. 0.5 MICROgram(s)/kG/Hr (4.475 mL/Hr) IV Continuous <Continuous>  furosemide   Injectable 40 milliGRAM(s) IV Push once  heparin  Injectable 5000 Unit(s) SubCutaneous every 8 hours  lidocaine 1% (Preservative-free) Injectable 20 milliLiter(s) Local Injection once  meropenem  IVPB 1000 milliGRAM(s) IV Intermittent every 8 hours  meropenem  IVPB      metolazone 10 milliGRAM(s) Oral once  norepinephrine Infusion 0.05 MICROgram(s)/kG/Min (8.391 mL/Hr) IV Continuous <Continuous>  pantoprazole   Suspension 40 milliGRAM(s) Oral daily  vancomycin  IVPB      vancomycin  IVPB 750 milliGRAM(s) IV Intermittent every 12 hours    MEDICATIONS  (PRN):        PHYSICAL EXAMINATION:  -----------------------------  Vital Signs Last 24 Hrs  T(C): 37.1 (28 Jul 2018 08:45), Max: 37.7 (27 Jul 2018 15:32)  T(F): 98.8 (28 Jul 2018 08:45), Max: 99.8 (27 Jul 2018 15:32)  HR: 61 (28 Jul 2018 12:26) (46 - 171)  BP: 77/56 (28 Jul 2018 10:00) (77/56 - 173/154)  BP(mean): 63 (28 Jul 2018 10:00) (56 - 162)  RR: 16 (28 Jul 2018 09:30) (13 - 29)  SpO2: 100% (28 Jul 2018 12:26) (88% - 100%)    Constitutional: intubated  Eyes: the conjunctiva exhibited no abnormalities and the eyelids demonstrated no xanthelasmas.   HEENT: normal oral mucosa, no oral pallor and no oral cyanosis.   Neck: normal jugular venous A waves present, normal jugular venous V waves present and no jugular venous mcknight A waves.   Pulmonary: no respiratory distress on vent support; decreased BS at bases with mild rales  Cardiovascular: irreg irreg; 2/6 SM  Abdomen: soft, non-tender, no hepato-splenomegaly  Musculoskeletal: the gait could not be assessed.  Extremities: no clubbing of the fingernails, no localized cyanosis  Skin: normal skin color and pigmentation, no rash, no venous stasis    LABS:   --------                          9.0    7.23  )-----------( 148      ( 28 Jul 2018 04:18 )             28.5   07-28    145  |  105  |  16  ----------------------------<  80  3.4<L>   |  31  |  0.56    Ca    8.1<L>      28 Jul 2018 04:18  Phos  2.7     07-28  Mg     1.7     07-28          INTERPRETATION:  CT of the chest, abdomen, and pelvis without contrast    Indication: Shock. Respiratory failure. Jaundice. Elevated bilirubin.    Technique: Axial multidetector CT images of the chest, abdomen, and   pelvis are acquired without IV or oral contrast.    Comparison: None.    Findings:    Chest: Mild bilateral pleural effusions. Small right pericardial   effusion. Cardiomegaly. No aortic aneurysm. Allowing for the noncontrast   technique, there is no grossly enlarged mediastinal, hilar, or axillary   lymph node.    ET tube terminates above the nivia. NG tube terminates in the stomach.    Small densities in the right mainstem bronchus may represent mucous   material.    Mild emphysematous changes in the right lung, suggestive of COPD. Small   bilateral atelectasis. Mild hazy groundglass densities in both lungs may   represent pulmonary edema or pneumonia. No evidence for pneumothorax.    Abdomen: Evaluation of the abdomen and pelvis is limited by lack of IV   and oral contrast. Allowing for noncontrast technique, the liver,   pancreas, spleen, and the right kidney appear grossly unremarkable. There   is a staghorn calculus in the left kidney. Apparent air in the left renal   calyces. No hydronephrosis.    Nonspecific adrenal thickening bilaterally.    The gallbladder appears contracted. Pericholecystic fluid versus   gallbladder wall edema. The common bile duct is not visualized on this   limited examination. If clinically indicated, abdominal ultrasound may be   pursued for further evaluation.    No bowel obstruction, or grossly thickened bowel wall. The appendix is   not identified. No evidence free air, or enlarged lymph node. Mild to   moderate ascites in the abdomen and pelvis.    Pelvis: There is a Lyons catheter in the urinary bladder which contains   air. The urinary bladder is underdistended, rendering evaluation   difficult. The bowel structures appear grossly unremarkable. No grossly   enlarged pelvic lymph node.    There is diffuse body wall edema in the chest, abdomen, and pelvis.    Impression: Anasarca.    Mild bilateral pleural effusions. Small right pericardial effusion.    Small densities in the right mainstem bronchus may represent mucous   material. Follow-up chest CT may be pursued in 4-6 weeks to ensure   clearance.    Mild hazy groundglass densities in both lungs may represent pulmonary   edema or pneumonia. Clinical correlation is recommended.    The gallbladder appears contracted. Pericholecystic fluid versus   gallbladder wall edema. The common bile duct is not visualized on this   limited examination. If clinically indicated, abdominal ultrasound may be   pursued for further evaluation. Alternatively, abdominal MR without and   with IV contrast including MRCP may be pursued.    No bowel obstruction, or grossly thickened bowel wall. The appendix is   not identified.    Mild to moderate ascites in the abdomen and pelvis.    Lyons catheter in the urinary bladder. Associated air in the urinary   bladder. Staghorn calculus in the left kidney. Apparent air in the left   renal calyces may be due to reflux from the urinary bladder or may   represent emphysematous pyelitis. Clinical correlation is recommended.    < end of copied text >    < from: US Duplex Venous Lower Ext Complete, Bilateral (07.19.18 @ 16:51) >  IMPRESSION:     No evidence of bilateral lower extremity deep venous thrombosis.    < end of copied text >        < from: TTE Echo Doppler w/o Cont (07.19.18 @ 19:57) >   1. Left ventricular ejection fraction, by visual estimation, is 30 to   35%.   2. There is no left ventricular hypertrophy.   3. Biatrial enlargement.   4. Right ventricular dilatation.   5. Moderate mitral valve regurgitation.   6. Mild tricuspid regurgitation.   7. Pulmonic valve regurgitation.   8. Moderately to severely decreased global left ventricular systolic   function.      < end of copied text >

## 2018-07-28 NOTE — PROGRESS NOTE ADULT - SUBJECTIVE AND OBJECTIVE BOX
UROLOGY PROGRESS HPI:  Pt seen and examined at bedside resting comfortably.     Vital Signs Last 24 Hrs  T(C): 37.1 (28 Jul 2018 08:45), Max: 37.7 (27 Jul 2018 15:32)  T(F): 98.8 (28 Jul 2018 08:45), Max: 99.8 (27 Jul 2018 15:32)  HR: 92 (28 Jul 2018 14:00) (46 - 171)  BP: 138/108 (28 Jul 2018 14:00) (77/56 - 173/154)  BP(mean): 117 (28 Jul 2018 14:00) (56 - 162)  RR: 16 (28 Jul 2018 09:30) (13 - 29)  SpO2: 100% (28 Jul 2018 14:00) (88% - 100%)      PHYSICAL EXAM:    GENERAL: NAD  CHEST/LUNG: Clear to ausculation, bilaterally   HEART: RRR S1S2  ABDOMEN: NTND  : nova with clear yellow urine  EXTREMITIES:  calf soft, non tender b/l    I&O's Detail    27 Jul 2018 07:01  -  28 Jul 2018 07:00  --------------------------------------------------------  IN:    fentaNYL Infusion.: 377 mL    Solution: 200 mL    Solution: 250 mL    Solution: 500 mL    Vital HN: 630 mL  Total IN: 1957 mL    OUT:    Indwelling Catheter - Urethral: 2070 mL  Total OUT: 2070 mL    Total NET: -113 mL          LABS:                        9.0    7.23  )-----------( 148      ( 28 Jul 2018 04:18 )             28.5     07-28    145  |  105  |  16  ----------------------------<  80  3.4<L>   |  31  |  0.56    Ca    8.1<L>      28 Jul 2018 04:18  Phos  2.7     07-28  Mg     1.7     07-28        Assessment: 53yo Female admitted with septic shock due to Left emphysematous pyelonephritis, respiratory failure, found with staghorn calculous of Left kidney on CT. Urine culture 7/19 with + e coli, klebsiella, CNS and enterococcus faecalis. Patient re-intubated for respiratory distress and hypoxia.     Plan:  Continue primary medical management per ICU team, wean as tolerated  continue Antibiotics per ID  continue nova, monitor urine output  No surgical intervention planned at present time. Recommend CT scan of abdomen/pelvis to follow up left emphysematous pyelonephritis

## 2018-07-28 NOTE — PROGRESS NOTE ADULT - ASSESSMENT
pt seen and examined with ICU team on 7/28    54F poor historian with unknown PMH presents from home after being on ground and unable to get up per report. EMS found pt on ground unable to obtain BP with monitor strip showing A-fib RVR. Pt cardioverted and converted to sinus rhythm. Also had FS 26 treated with dextrose. Admitted to ICU for severe sepsis with septic shock, UTI, emphysematous pyelonephritis, L staghorn calculi, ARF with ATN, acute respiratory failure requiring intubation in ICU 7/19, Extubated 7/23. Reintubated for hypoxic respiratory failure due to acute pulmonary edema, acute systolic heart failure, acute liver failure, hyperbilirubinemia, a-fib RVR, hypoglycemia, acute metabolic/hepatic encephalopathy., severe metabolic acidosis.     1. NEURO  - encephalopathy resolved  - following commands  - daily sedation vacation    2. PULM  - cont daily wean as tolerates  - failed wean today due to tachypnea, tachycardia, low TV  - will cont to diurese pt for pulmonary edema, volume overload    3. CV  - restarted back on levophed due to hypotension  - propofol/precedex/midodrine d/jovita due to bradycardia which is likely due to medication effects  - will cont to monitor HR  -will cont aggressive diureses with lasix and zaroxolyn for volume overload, acute systolic heart failure  - unclear if pt with hx of CHF vs cardiomyopathy due to underlying sepsis  - cardiology follow up    4. GI  - hyperbilirubinemia improving as well as LFTs  - cause of liver function abnormalities can be due to congestive hepatopathy vs underlying septic shock  - will cont to monitor  - pt on admission did receive NAC treatment in setting of liver failure and unknown hx, U tox negative, hepatitis panel negative    5. RENAL  - ARF with ATN due to septic shock  - metabolic acidosis resolved  - nova draining urine, good urine output with diuresis  - will cont to monitor  - supplement hypokalemia     6. ID  - septic shock due to emphysematous pyelonephritis  - u cx growing klebsiella, enterococcus, and e-coli  - cont with vanco and meropenem  - pt had been on fluconazole since admission 7/19, d/jovita s/p 1 week of fungal coverage already  - ID follow up    7. ENDO  - no further hypoglycemia  - cont NGT feeds    8. GEN  - no family has been at bedside, reportedly has a daughter out of state possible in [place]?  - will need social work to locate family  - early mobilization as tolerates    Critical Care Time: 45 min

## 2018-07-29 LAB
ALBUMIN SERPL ELPH-MCNC: 1.7 G/DL — LOW (ref 3.3–5)
ALP SERPL-CCNC: 84 U/L — SIGNIFICANT CHANGE UP (ref 40–120)
ALT FLD-CCNC: 29 U/L — SIGNIFICANT CHANGE UP (ref 12–78)
ANION GAP SERPL CALC-SCNC: 8 MMOL/L — SIGNIFICANT CHANGE UP (ref 5–17)
AST SERPL-CCNC: 34 U/L — SIGNIFICANT CHANGE UP (ref 15–37)
BILIRUB SERPL-MCNC: 2.4 MG/DL — HIGH (ref 0.2–1.2)
BUN SERPL-MCNC: 15 MG/DL — SIGNIFICANT CHANGE UP (ref 7–23)
CALCIUM SERPL-MCNC: 7.8 MG/DL — LOW (ref 8.5–10.1)
CHLORIDE SERPL-SCNC: 106 MMOL/L — SIGNIFICANT CHANGE UP (ref 96–108)
CO2 SERPL-SCNC: 30 MMOL/L — SIGNIFICANT CHANGE UP (ref 22–31)
CREAT SERPL-MCNC: 0.48 MG/DL — LOW (ref 0.5–1.3)
GLUCOSE SERPL-MCNC: 92 MG/DL — SIGNIFICANT CHANGE UP (ref 70–99)
HCT VFR BLD CALC: 27.6 % — LOW (ref 34.5–45)
HGB BLD-MCNC: 8.5 G/DL — LOW (ref 11.5–15.5)
MAGNESIUM SERPL-MCNC: 1.8 MG/DL — SIGNIFICANT CHANGE UP (ref 1.6–2.6)
MCHC RBC-ENTMCNC: 29.1 PG — SIGNIFICANT CHANGE UP (ref 27–34)
MCHC RBC-ENTMCNC: 30.8 GM/DL — LOW (ref 32–36)
MCV RBC AUTO: 94.5 FL — SIGNIFICANT CHANGE UP (ref 80–100)
NRBC # BLD: 0 /100 WBCS — SIGNIFICANT CHANGE UP (ref 0–0)
PHOSPHATE SERPL-MCNC: 3.1 MG/DL — SIGNIFICANT CHANGE UP (ref 2.5–4.5)
PLATELET # BLD AUTO: 152 K/UL — SIGNIFICANT CHANGE UP (ref 150–400)
POTASSIUM SERPL-MCNC: 3.4 MMOL/L — LOW (ref 3.5–5.3)
POTASSIUM SERPL-SCNC: 3.4 MMOL/L — LOW (ref 3.5–5.3)
PROT SERPL-MCNC: 6.4 GM/DL — SIGNIFICANT CHANGE UP (ref 6–8.3)
RBC # BLD: 2.92 M/UL — LOW (ref 3.8–5.2)
RBC # FLD: 23.1 % — HIGH (ref 10.3–14.5)
SODIUM SERPL-SCNC: 144 MMOL/L — SIGNIFICANT CHANGE UP (ref 135–145)
WBC # BLD: 7.36 K/UL — SIGNIFICANT CHANGE UP (ref 3.8–10.5)
WBC # FLD AUTO: 7.36 K/UL — SIGNIFICANT CHANGE UP (ref 3.8–10.5)

## 2018-07-29 PROCEDURE — 99232 SBSQ HOSP IP/OBS MODERATE 35: CPT

## 2018-07-29 RX ORDER — ALPRAZOLAM 0.25 MG
0.25 TABLET ORAL AT BEDTIME
Qty: 0 | Refills: 0 | Status: DISCONTINUED | OUTPATIENT
Start: 2018-07-29 | End: 2018-08-01

## 2018-07-29 RX ORDER — POTASSIUM CHLORIDE 20 MEQ
20 PACKET (EA) ORAL ONCE
Qty: 0 | Refills: 0 | Status: COMPLETED | OUTPATIENT
Start: 2018-07-29 | End: 2018-07-29

## 2018-07-29 RX ORDER — FUROSEMIDE 40 MG
60 TABLET ORAL ONCE
Qty: 0 | Refills: 0 | Status: COMPLETED | OUTPATIENT
Start: 2018-07-29 | End: 2018-07-29

## 2018-07-29 RX ORDER — POTASSIUM CHLORIDE 20 MEQ
10 PACKET (EA) ORAL ONCE
Qty: 0 | Refills: 0 | Status: COMPLETED | OUTPATIENT
Start: 2018-07-29 | End: 2018-07-29

## 2018-07-29 RX ADMIN — MEROPENEM 100 MILLIGRAM(S): 1 INJECTION INTRAVENOUS at 21:13

## 2018-07-29 RX ADMIN — CHLORHEXIDINE GLUCONATE 1 APPLICATION(S): 213 SOLUTION TOPICAL at 05:24

## 2018-07-29 RX ADMIN — FENTANYL CITRATE 4.47 MICROGRAM(S)/KG/HR: 50 INJECTION INTRAVENOUS at 05:23

## 2018-07-29 RX ADMIN — Medication 20 MILLIEQUIVALENT(S): at 12:45

## 2018-07-29 RX ADMIN — CHLORHEXIDINE GLUCONATE 15 MILLILITER(S): 213 SOLUTION TOPICAL at 18:09

## 2018-07-29 RX ADMIN — PANTOPRAZOLE SODIUM 40 MILLIGRAM(S): 20 TABLET, DELAYED RELEASE ORAL at 12:45

## 2018-07-29 RX ADMIN — Medication 0.25 MILLIGRAM(S): at 21:13

## 2018-07-29 RX ADMIN — Medication 60 MILLIGRAM(S): at 12:35

## 2018-07-29 RX ADMIN — Medication 8.39 MICROGRAM(S)/KG/MIN: at 16:51

## 2018-07-29 RX ADMIN — Medication 100 MILLIEQUIVALENT(S): at 06:40

## 2018-07-29 RX ADMIN — MEROPENEM 100 MILLIGRAM(S): 1 INJECTION INTRAVENOUS at 06:06

## 2018-07-29 RX ADMIN — FENTANYL CITRATE 4.47 MICROGRAM(S)/KG/HR: 50 INJECTION INTRAVENOUS at 14:46

## 2018-07-29 RX ADMIN — Medication 250 MILLIGRAM(S): at 06:06

## 2018-07-29 RX ADMIN — Medication 250 MILLIGRAM(S): at 17:22

## 2018-07-29 RX ADMIN — MEROPENEM 100 MILLIGRAM(S): 1 INJECTION INTRAVENOUS at 14:55

## 2018-07-29 RX ADMIN — CHLORHEXIDINE GLUCONATE 15 MILLILITER(S): 213 SOLUTION TOPICAL at 05:24

## 2018-07-29 RX ADMIN — FENTANYL CITRATE 4.47 MICROGRAM(S)/KG/HR: 50 INJECTION INTRAVENOUS at 22:06

## 2018-07-29 NOTE — PROGRESS NOTE ADULT - ASSESSMENT
pt seen and examined with ICU team on 7/29    54F poor historian with unknown PMH presents from home after being on ground and unable to get up per report. EMS found pt on ground unable to obtain BP with monitor strip showing A-fib RVR. Pt cardioverted and converted to sinus rhythm. Also had FS 26 treated with dextrose. Admitted to ICU for severe sepsis with septic shock, UTI, emphysematous pyelonephritis, L staghorn calculi, ARF with ATN, acute respiratory failure requiring intubation in ICU 7/19, Extubated 7/23. Reintubated for hypoxic respiratory failure due to acute pulmonary edema, acute systolic heart failure, acute liver failure, hyperbilirubinemia, a-fib RVR, hypoglycemia, acute metabolic/hepatic encephalopathy., severe metabolic acidosis.     1. NEURO  - encephalopathy resolved  - following commands  - daily sedation vacation  - add low dose xanax 0.25mg at bedtime to help with overall anxiety and agitation    2. PULM  - cont daily wean as tolerates  - failed wean today due to tachypnea, tachycardia, low TV  - will cont to diurese pt for pulmonary edema, volume overload    3. CV  - wean off levophed as BP tolerates  - propofol/precedex/midodrine off due to bradycardia which is likely due to medication effects  - will cont to monitor HR  - cont aggressive diureses with lasix and zaroxolyn for volume overload, acute systolic heart failure  - unclear if pt with hx of CHF vs cardiomyopathy due to underlying sepsis  - cardiology follow up    4. GI  - hyperbilirubinemia improving as well as LFTs  - cause of liver function abnormalities can be due to congestive hepatopathy vs underlying septic shock  - will cont to monitor  - pt on admission did receive NAC treatment in setting of liver failure and unknown hx, U tox negative, hepatitis panel negative    5. RENAL  - ARF with ATN due to septic shock  - metabolic acidosis resolved  - nova draining urine, good urine output with diuresis  - will cont to monitor  - Cr normalized  - supplement hypokalemia   - appreciate urology follow up for staghorn calculi and emphysematous pyelonephritis  - to repeat CT abdomen likely next week to reevaluate stone, no intervention at present time    6. ID  - septic shock due to emphysematous pyelonephritis  - u cx growing klebsiella, enterococcus, and e-coli  - cont with vanco and meropenem  - pt had been on fluconazole since admission 7/19, d/jovita s/p 1 week of fungal coverage   - ID follow up    7. ENDO  - no further hypoglycemia  - cont NGT feeds    8. GEN  - no family has been at bedside, reportedly has a daughter out of state possibly in [place]?  - will need social work to locate family  - early mobilization as tolerates    Critical Care Time: 45 min

## 2018-07-29 NOTE — PROGRESS NOTE ADULT - ASSESSMENT
53yo lady who presented to the ER in respiratory distress / metabolic acidosis / hepatic encephalopathy / hepatic failure.  Intubated after a ?PEA arrest s/p DCCV for Afib with RVR.  + septic shock due to emphysematous pyelonephritis with stag horn calculous in left kidney  Echo: LVEF 30%, RV enlargement, mod MR  Extubated but reintubated 2/2 acute pulm edema yesterday.  Not tolerating weaning trial.. tachy and hypotensive.  On levo.    Following commands when sedation held.    -cont abx for septic shock  -HRs currently 60-80s; unable to start any medications for rate control 2/2 hypotension requiring pressors; became mitul with dig.  -supportive care  -replete lytes  -monitor creat/LFTs  -remains quite edematous , agree with continued aggressive diuresis... 

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
## 2018-07-29 NOTE — PROGRESS NOTE ADULT - SUBJECTIVE AND OBJECTIVE BOX
Patient seen and examined at bedside. Intubated.     T(F): 99.5 (07-29-18 @ 16:30), Max: 99.9 (07-29-18 @ 11:30)  HR: 52 (07-29-18 @ 18:00) (50 - 99)  BP: 97/61 (07-29-18 @ 18:00) (78/54 - 148/107)  RR: 14 (07-29-18 @ 18:00) (14 - 30)  SpO2: 100% (07-29-18 @ 18:00) (85% - 100%)    PHYSICAL EXAM:  General: NAD, WDWN  CV: +S1S2 regular rate and rhythm  Lung: Respirations nonlabored  Abdomen: Soft, NTND  : Lyons in place draining yellow urine, output: 3400cc/24hrs  Ext: B/l LE pitting edema    LABS:                        8.5    7.36  )-----------( 152      ( 29 Jul 2018 04:22 )             27.6     07-29    144  |  106  |  15  ----------------------------<  92  3.4<L>   |  30  |  0.48<L>    Ca    7.8<L>      29 Jul 2018 04:22  Phos  3.1     07-29  Mg     1.8     07-29    TPro  6.4  /  Alb  1.7<L>  /  TBili  2.4<H>  /  DBili  x   /  AST  34  /  ALT  29  /  AlkPhos  84  07-29      Impression: 54F a/w septic shock, SHARYN, respiratory failure,  due to left emphysematous pyelonephritis, found with staghorn calculous of left kidney on CT, urine culture 7/19 with + e coli, klebsiella, CNS and enterococcus faecalis. Intubated. On pressor support.   Plan:  - Continue antibiotics per ID  - Lyons catheter, monitor urine output, monitor renal function   - Continue ICU and cardiac care  - Discussed with Dr. Frances

## 2018-07-29 NOTE — PROGRESS NOTE ADULT - SUBJECTIVE AND OBJECTIVE BOX
53yo Female who presented to the ER in respiratory distress / metabolic acidosis / hepatic encephalopathy / hepatic failure.  Intubated after a ?PEA arrest s/p DCCV for Afib with RVR.  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  Did not tolerate weaning trial; tachy and hypotensive again. On levo.  Following commands when sedation held.    MEDICATIONS  (STANDING):  ALPRAZolam 0.25 milliGRAM(s) Oral at bedtime  chlorhexidine 0.12% Liquid 15 milliLiter(s) Swish and Spit two times a day  chlorhexidine 4% Liquid 1 Application(s) Topical <User Schedule>  fentaNYL   Infusion. 0.5 MICROgram(s)/kG/Hr (4.475 mL/Hr) IV Continuous <Continuous>  heparin  Injectable 5000 Unit(s) SubCutaneous every 8 hours  lidocaine 1% (Preservative-free) Injectable 20 milliLiter(s) Local Injection once  meropenem  IVPB 1000 milliGRAM(s) IV Intermittent every 8 hours  meropenem  IVPB      metolazone 10 milliGRAM(s) Oral once  norepinephrine Infusion 0.05 MICROgram(s)/kG/Min (8.391 mL/Hr) IV Continuous <Continuous>  pantoprazole   Suspension 40 milliGRAM(s) Oral daily  vancomycin  IVPB      vancomycin  IVPB 750 milliGRAM(s) IV Intermittent every 12 hours    MEDICATIONS  (PRN):      PHYSICAL EXAMINATION:  -----------------------------  Vital Signs Last 24 Hrs  T(C): 37.7 (29 Jul 2018 11:30), Max: 37.7 (29 Jul 2018 11:30)  T(F): 99.9 (29 Jul 2018 11:30), Max: 99.9 (29 Jul 2018 11:30)  HR: 60 (29 Jul 2018 13:30) (50 - 99)  BP: 102/71 (29 Jul 2018 13:30) (78/54 - 148/107)  BP(mean): 82 (29 Jul 2018 13:30) (59 - 119)  RR: 14 (29 Jul 2018 13:30) (14 - 30)  SpO2: 100% (29 Jul 2018 13:30) (85% - 100%)    Constitutional: intubated; moving arms and head when sedation off  Eyes: the conjunctiva exhibited no abnormalities and the eyelids demonstrated no xanthelasmas.   HEENT: normal oral mucosa, no oral pallor and no oral cyanosis.   Neck: normal jugular venous A waves present, normal jugular venous V waves present and no jugular venous mcknight A waves.   Pulmonary: no respiratory distress on vent support; decreased BS at bases with mild rales  Cardiovascular: irreg irreg; 2/6 SM  Abdomen: soft, non-tender, no hepato-splenomegaly  Musculoskeletal: the gait could not be assessed.  Extremities: no clubbing of the fingernails, no localized cyanosis  Skin: normal skin color and pigmentation, no rash, no venous stasis    LABS:   --------                          8.5    7.36  )-----------( 152      ( 29 Jul 2018 04:22 )             27.6   07-29    144  |  106  |  15  ----------------------------<  92  3.4<L>   |  30  |  0.48<L>    Ca    7.8<L>      29 Jul 2018 04:22  Phos  3.1     07-29  Mg     1.8     07-29    TPro  6.4  /  Alb  1.7<L>  /  TBili  2.4<H>  /  DBili  x   /  AST  34  /  ALT  29  /  AlkPhos  84  07-29        INTERPRETATION:  CT of the chest, abdomen, and pelvis without contrast    Indication: Shock. Respiratory failure. Jaundice. Elevated bilirubin.    Technique: Axial multidetector CT images of the chest, abdomen, and   pelvis are acquired without IV or oral contrast.    Comparison: None.    Findings:    Chest: Mild bilateral pleural effusions. Small right pericardial   effusion. Cardiomegaly. No aortic aneurysm. Allowing for the noncontrast   technique, there is no grossly enlarged mediastinal, hilar, or axillary   lymph node.    ET tube terminates above the nivia. NG tube terminates in the stomach.    Small densities in the right mainstem bronchus may represent mucous   material.    Mild emphysematous changes in the right lung, suggestive of COPD. Small   bilateral atelectasis. Mild hazy groundglass densities in both lungs may   represent pulmonary edema or pneumonia. No evidence for pneumothorax.    Abdomen: Evaluation of the abdomen and pelvis is limited by lack of IV   and oral contrast. Allowing for noncontrast technique, the liver,   pancreas, spleen, and the right kidney appear grossly unremarkable. There   is a staghorn calculus in the left kidney. Apparent air in the left renal   calyces. No hydronephrosis.    Nonspecific adrenal thickening bilaterally.    The gallbladder appears contracted. Pericholecystic fluid versus   gallbladder wall edema. The common bile duct is not visualized on this   limited examination. If clinically indicated, abdominal ultrasound may be   pursued for further evaluation.    No bowel obstruction, or grossly thickened bowel wall. The appendix is   not identified. No evidence free air, or enlarged lymph node. Mild to   moderate ascites in the abdomen and pelvis.    Pelvis: There is a Lyons catheter in the urinary bladder which contains   air. The urinary bladder is underdistended, rendering evaluation   difficult. The bowel structures appear grossly unremarkable. No grossly   enlarged pelvic lymph node.    There is diffuse body wall edema in the chest, abdomen, and pelvis.    Impression: Anasarca.    Mild bilateral pleural effusions. Small right pericardial effusion.    Small densities in the right mainstem bronchus may represent mucous   material. Follow-up chest CT may be pursued in 4-6 weeks to ensure   clearance.    Mild hazy groundglass densities in both lungs may represent pulmonary   edema or pneumonia. Clinical correlation is recommended.    The gallbladder appears contracted. Pericholecystic fluid versus   gallbladder wall edema. The common bile duct is not visualized on this   limited examination. If clinically indicated, abdominal ultrasound may be   pursued for further evaluation. Alternatively, abdominal MR without and   with IV contrast including MRCP may be pursued.    No bowel obstruction, or grossly thickened bowel wall. The appendix is   not identified.    Mild to moderate ascites in the abdomen and pelvis.    Lyons catheter in the urinary bladder. Associated air in the urinary   bladder. Staghorn calculus in the left kidney. Apparent air in the left   renal calyces may be due to reflux from the urinary bladder or may   represent emphysematous pyelitis. Clinical correlation is recommended.    < end of copied text >    < from: US Duplex Venous Lower Ext Complete, Bilateral (07.19.18 @ 16:51) >  IMPRESSION:     No evidence of bilateral lower extremity deep venous thrombosis.    < end of copied text >        < from: TTE Echo Doppler w/o Cont (07.19.18 @ 19:57) >   1. Left ventricular ejection fraction, by visual estimation, is 30 to   35%.   2. There is no left ventricular hypertrophy.   3. Biatrial enlargement.   4. Right ventricular dilatation.   5. Moderate mitral valve regurgitation.   6. Mild tricuspid regurgitation.   7. Pulmonic valve regurgitation.   8. Moderately to severely decreased global left ventricular systolic   function.      < end of copied text >

## 2018-07-30 LAB
ALBUMIN SERPL ELPH-MCNC: 1.7 G/DL — LOW (ref 3.3–5)
ALP SERPL-CCNC: 81 U/L — SIGNIFICANT CHANGE UP (ref 40–120)
ALT FLD-CCNC: 26 U/L — SIGNIFICANT CHANGE UP (ref 12–78)
ANION GAP SERPL CALC-SCNC: 7 MMOL/L — SIGNIFICANT CHANGE UP (ref 5–17)
AST SERPL-CCNC: 34 U/L — SIGNIFICANT CHANGE UP (ref 15–37)
BILIRUB DIRECT SERPL-MCNC: 2.01 MG/DL — HIGH (ref 0.05–0.2)
BILIRUB INDIRECT FLD-MCNC: 0.3 MG/DL — SIGNIFICANT CHANGE UP (ref 0.2–1)
BILIRUB SERPL-MCNC: 2.3 MG/DL — HIGH (ref 0.2–1.2)
BUN SERPL-MCNC: 17 MG/DL — SIGNIFICANT CHANGE UP (ref 7–23)
CALCIUM SERPL-MCNC: 8.3 MG/DL — LOW (ref 8.5–10.1)
CHLORIDE SERPL-SCNC: 104 MMOL/L — SIGNIFICANT CHANGE UP (ref 96–108)
CO2 SERPL-SCNC: 33 MMOL/L — HIGH (ref 22–31)
CREAT SERPL-MCNC: 0.52 MG/DL — SIGNIFICANT CHANGE UP (ref 0.5–1.3)
GLUCOSE SERPL-MCNC: 100 MG/DL — HIGH (ref 70–99)
HCT VFR BLD CALC: 27.2 % — LOW (ref 34.5–45)
HGB BLD-MCNC: 8.4 G/DL — LOW (ref 11.5–15.5)
MAGNESIUM SERPL-MCNC: 1.8 MG/DL — SIGNIFICANT CHANGE UP (ref 1.6–2.6)
MCHC RBC-ENTMCNC: 29.1 PG — SIGNIFICANT CHANGE UP (ref 27–34)
MCHC RBC-ENTMCNC: 30.9 GM/DL — LOW (ref 32–36)
MCV RBC AUTO: 94.1 FL — SIGNIFICANT CHANGE UP (ref 80–100)
NRBC # BLD: 0 /100 WBCS — SIGNIFICANT CHANGE UP (ref 0–0)
PHOSPHATE SERPL-MCNC: 2.9 MG/DL — SIGNIFICANT CHANGE UP (ref 2.5–4.5)
PLATELET # BLD AUTO: 150 K/UL — SIGNIFICANT CHANGE UP (ref 150–400)
POTASSIUM SERPL-MCNC: 3.4 MMOL/L — LOW (ref 3.5–5.3)
POTASSIUM SERPL-SCNC: 3.4 MMOL/L — LOW (ref 3.5–5.3)
PROT SERPL-MCNC: 6.3 GM/DL — SIGNIFICANT CHANGE UP (ref 6–8.3)
RBC # BLD: 2.89 M/UL — LOW (ref 3.8–5.2)
RBC # FLD: 22.5 % — HIGH (ref 10.3–14.5)
SODIUM SERPL-SCNC: 144 MMOL/L — SIGNIFICANT CHANGE UP (ref 135–145)
WBC # BLD: 7.73 K/UL — SIGNIFICANT CHANGE UP (ref 3.8–10.5)
WBC # FLD AUTO: 7.73 K/UL — SIGNIFICANT CHANGE UP (ref 3.8–10.5)

## 2018-07-30 PROCEDURE — 99233 SBSQ HOSP IP/OBS HIGH 50: CPT

## 2018-07-30 RX ORDER — FLUCONAZOLE 150 MG/1
TABLET ORAL
Qty: 0 | Refills: 0 | Status: DISCONTINUED | OUTPATIENT
Start: 2018-07-30 | End: 2018-08-03

## 2018-07-30 RX ORDER — ALPRAZOLAM 0.25 MG
0.25 TABLET ORAL ONCE
Qty: 0 | Refills: 0 | Status: DISCONTINUED | OUTPATIENT
Start: 2018-07-30 | End: 2018-07-30

## 2018-07-30 RX ORDER — FLUCONAZOLE 150 MG/1
200 TABLET ORAL EVERY 24 HOURS
Qty: 0 | Refills: 0 | Status: DISCONTINUED | OUTPATIENT
Start: 2018-07-31 | End: 2018-08-03

## 2018-07-30 RX ORDER — FUROSEMIDE 40 MG
20 TABLET ORAL DAILY
Qty: 0 | Refills: 0 | Status: DISCONTINUED | OUTPATIENT
Start: 2018-07-30 | End: 2018-08-01

## 2018-07-30 RX ORDER — FLUCONAZOLE 150 MG/1
TABLET ORAL
Qty: 0 | Refills: 0 | Status: DISCONTINUED | OUTPATIENT
Start: 2018-07-30 | End: 2018-07-30

## 2018-07-30 RX ORDER — FLUCONAZOLE 150 MG/1
200 TABLET ORAL ONCE
Qty: 0 | Refills: 0 | Status: DISCONTINUED | OUTPATIENT
Start: 2018-07-30 | End: 2018-07-30

## 2018-07-30 RX ORDER — POTASSIUM CHLORIDE 20 MEQ
10 PACKET (EA) ORAL ONCE
Qty: 0 | Refills: 0 | Status: COMPLETED | OUTPATIENT
Start: 2018-07-30 | End: 2018-07-30

## 2018-07-30 RX ORDER — FLUCONAZOLE 150 MG/1
200 TABLET ORAL ONCE
Qty: 0 | Refills: 0 | Status: COMPLETED | OUTPATIENT
Start: 2018-07-30 | End: 2018-07-30

## 2018-07-30 RX ORDER — HYDROCORTISONE 20 MG
100 TABLET ORAL EVERY 8 HOURS
Qty: 0 | Refills: 0 | Status: COMPLETED | OUTPATIENT
Start: 2018-07-30 | End: 2018-08-01

## 2018-07-30 RX ADMIN — MEROPENEM 100 MILLIGRAM(S): 1 INJECTION INTRAVENOUS at 05:23

## 2018-07-30 RX ADMIN — Medication 100 MILLIGRAM(S): at 15:03

## 2018-07-30 RX ADMIN — Medication 100 MILLIGRAM(S): at 21:35

## 2018-07-30 RX ADMIN — MEROPENEM 100 MILLIGRAM(S): 1 INJECTION INTRAVENOUS at 13:21

## 2018-07-30 RX ADMIN — Medication 100 MILLIEQUIVALENT(S): at 06:56

## 2018-07-30 RX ADMIN — Medication 250 MILLIGRAM(S): at 17:30

## 2018-07-30 RX ADMIN — Medication 20 MILLIGRAM(S): at 17:28

## 2018-07-30 RX ADMIN — CHLORHEXIDINE GLUCONATE 15 MILLILITER(S): 213 SOLUTION TOPICAL at 05:36

## 2018-07-30 RX ADMIN — FLUCONAZOLE 100 MILLIGRAM(S): 150 TABLET ORAL at 20:06

## 2018-07-30 RX ADMIN — HEPARIN SODIUM 5000 UNIT(S): 5000 INJECTION INTRAVENOUS; SUBCUTANEOUS at 05:27

## 2018-07-30 RX ADMIN — FENTANYL CITRATE 4.47 MICROGRAM(S)/KG/HR: 50 INJECTION INTRAVENOUS at 04:50

## 2018-07-30 RX ADMIN — CHLORHEXIDINE GLUCONATE 1 APPLICATION(S): 213 SOLUTION TOPICAL at 05:26

## 2018-07-30 RX ADMIN — Medication 0.25 MILLIGRAM(S): at 12:07

## 2018-07-30 RX ADMIN — MEROPENEM 100 MILLIGRAM(S): 1 INJECTION INTRAVENOUS at 21:35

## 2018-07-30 RX ADMIN — Medication 250 MILLIGRAM(S): at 06:56

## 2018-07-30 NOTE — PROGRESS NOTE ADULT - ASSESSMENT
55 yo F arrived with septic shock, with acute kidney injury acute respiratory failure, with persistent lactic acidosis in the setting of emphysematous pyelonephritis. Also found to have liver failure with ascites and anasarca likely sec to congestive heart failure? EF 30-35%.      Neuro: awake and alert today, off sedation, weaning trial  CV: Septic shock, now resolving, on small dose levophed. add stress dose steroids.  conts to have episodic tachycardia/bradycardia. diuresed yesterday for anasarca diuresed up to 4L.    Pulm: weaning well, will extubated today.    GI: Extubated today, speech and swallow at bedside   Renal/Metabolic: Acute emphysematous pyelonephritis, with acute kidney injury now resolved; follow up urology.   ID: Emphysematous pyelo, with Ecoli , enterococcus and kleb in urine, cont on Vancomycin and Meropenem; followup ID   Endo: No acute issues  DVT/GI ppx    Critical Care Time Spent 35 min.

## 2018-07-30 NOTE — PROGRESS NOTE ADULT - PROBLEM SELECTOR PLAN 1
Bili 2.3   [id number]- continues to improve.   No signs of hemochromatosis. MAZIN  1 : 320 butiliruben improving without any direct treatment.  - Supportive care for now ( follow up labs ordered )

## 2018-07-30 NOTE — PROGRESS NOTE ADULT - SUBJECTIVE AND OBJECTIVE BOX
TMAX - 99.9    On day # 12 Meropenem / # 12 Vanco / # 12 Diflucan now    Vital Signs Last 24 Hrs  T(C): 37.2 (30 Jul 2018 16:00), Max: 37.2 (30 Jul 2018 16:00)  T(F): 99 (30 Jul 2018 16:00), Max: 99 (30 Jul 2018 16:00)  HR: 50 (30 Jul 2018 18:00) (50 - 84)  BP: 124/84 (30 Jul 2018 18:00) (80/57 - 139/100)  BP(mean): 96 (30 Jul 2018 18:00) (52 - 120)  RR: 27 (30 Jul 2018 18:00) (0 - 27)  SpO2: 100% (30 Jul 2018 18:00) (85% - 100%)  Mode: CPAP with PS  FiO2: 30  PEEP: 5  PS: 5  Supplemental O2:  on NC O2 now      Awake, alert, extubated earlier today and now using NC O2.  Also with Levophed d/c'ed now as well with BP stable at present.  No c/o SOB or cp, and no c/o abdominal pain or back pain now.      PHYSICAL EXAM  General:  awake, alert, sitting upright in bed, coughing intermittently with marked congestion noted, using NC O2 now, in NAD at present  HEENT: conj pink, sclerae anicteric, PERRLA, no oral lesions noted - mostly edentulous with one tooth on the Lt lower jaw   Neck: supple, no nodes noted           LIJ CVP in place - site clean with dressing dry and intact - placed 7/25/18   Heart:  RR  Lungs: bilateral upper airway rhonchi   Abdomen:  BS+, soft, nontender, + edema of the abdominal wall  Extremities:  3+ edema LE's with chronic skin changes both LE's                    arms and hands remain markedly swollen   Skin:  warm dry, no rash noted        I&O's Summary :    29 Jul 2018 07:01  -  30 Jul 2018 07:00  --------------------------------------------------------  IN: 2767 mL / OUT: 4655 mL / NET: -1888 mL    30 Jul 2018 07:01  -  30 Jul 2018 18:57  --------------------------------------------------------  IN: 747.9 mL / OUT: 790 mL / NET: -42.1 mL      LABS:  CBC Full  -  ( 30 Jul 2018 02:35 )  WBC Count : 7.73 K/uL  Hemoglobin : 8.4 g/dL  Hematocrit : 27.2 %  Platelet Count - Automated : 150 K/uL  Mean Cell Volume : 94.1 fl  Mean Cell Hemoglobin : 29.1 pg  Mean Cell Hemoglobin Concentration : 30.9 gm/dL  Auto Neutrophil # : x  Auto Lymphocyte # : x  Auto Monocyte # : x  Auto Eosinophil # : x  Auto Basophil # : x  Auto Neutrophil % : x  Auto Lymphocyte % : x  Auto Monocyte % : x  Auto Eosinophil % : x  Auto Basophil % : x    07-30    144  |  104  |  17  ----------------------------<  100<H>  3.4<L>   |  33<H>  |  0.52    Ca    8.3<L>      30 Jul 2018 02:35  Phos  2.9     07-30  Mg     1.8     07-30    TPro  6.3  /  Alb  1.7<L>  /  TBili  2.3<H>  /  DBili  2.01<H>  /  AST  34  /  ALT  26  /  AlkPhos  81  07-30    LIVER FUNCTIONS - ( 30 Jul 2018 02:35 )  Alb: 1.7 g/dL / Pro: 6.3 gm/dL / ALK PHOS: 81 U/L / ALT: 26 U/L / AST: 34 U/L / GGT: x             Sedimentation Rate, Erythrocyte: 39 mm/hr (07-26 @ 03:43)      Vancomycin Level, Trough: 17.6 ug/mL (07-28 @ 17:58)          MICROBIOLOGY:  Specimen Source: .Sputum Sputum (07-25 @ 22:57)  Culture Results:   No growth at 48 hours (07-25 @ 22:57)          Radiology:   < from: Xray Chest 1 View AP/PA. (07.28.18 @ 08:27) >  Findings: A left-sided IJ central line appears unchanged in position.    The patient is status post endotracheal tube placement in good position.   There is an NG Tube with its tip projecting below the diaphragm.    A left lower lobe opacity is notable compatible with atelectasis and/or   pneumonia and/or effusion. A trace right-sided pleural effusion is   notable. There is mild pulmonary vascular congestion. The heart size is   normal. There are mild multilevel degenerative changes ofthe thoracic   spine.     IMPRESSION: As above, no interval change.          Impression:  Clinically improving slowly now on present ab rx with Meropenem + Vanco + Diflucan for rx of Sepsis with Shock and Respiratory Failure secondary to Lt Emphysematous Pyelonephritis with underlying Lt Staghorn Calculus and Multi-focal PNA.  Now off pressors and extubated today with WBC's decreased and low-grade temps.  S/p initial episode of Rapid AFib and Cardioverted to RSR on admission.      Suggestions:  Will continue present ab rx and continue to follow temps and labs closely.  Follow-up CXR.  Discussed with patient at bedside. TMAX - 99.9    On day # 12 Meropenem / # 12 Vanco /  Diflucan  apparently d/c'ed on 7/26     Vital Signs Last 24 Hrs  T(C): 37.2 (30 Jul 2018 16:00), Max: 37.2 (30 Jul 2018 16:00)  T(F): 99 (30 Jul 2018 16:00), Max: 99 (30 Jul 2018 16:00)  HR: 50 (30 Jul 2018 18:00) (50 - 84)  BP: 124/84 (30 Jul 2018 18:00) (80/57 - 139/100)  BP(mean): 96 (30 Jul 2018 18:00) (52 - 120)  RR: 27 (30 Jul 2018 18:00) (0 - 27)  SpO2: 100% (30 Jul 2018 18:00) (85% - 100%)  Mode: CPAP with PS  FiO2: 30  PEEP: 5  PS: 5  Supplemental O2:  on NC O2 now      Awake, alert, extubated earlier today and now using NC O2.  Also with Levophed d/c'ed now as well with BP stable at present.  No c/o SOB or cp, and no c/o abdominal pain or back pain now.      PHYSICAL EXAM  General:  awake, alert, sitting upright in bed, coughing intermittently with marked congestion noted, using NC O2 now, in NAD at present  HEENT: conj pink, sclerae anicteric, PERRLA, no oral lesions noted - mostly edentulous with one tooth on the Lt lower jaw   Neck: supple, no nodes noted           LIJ CVP in place - site clean with dressing dry and intact - placed 7/25/18   Heart:  RR  Lungs: bilateral upper airway rhonchi   Abdomen:  BS+, soft, nontender, + edema of the abdominal wall  Extremities:  3+ edema LE's with chronic skin changes both LE's                    arms and hands remain markedly swollen   Skin:  warm dry, no rash noted        I&O's Summary :    29 Jul 2018 07:01  -  30 Jul 2018 07:00  --------------------------------------------------------  IN: 2767 mL / OUT: 4655 mL / NET: -1888 mL    30 Jul 2018 07:01  -  30 Jul 2018 18:57  --------------------------------------------------------  IN: 747.9 mL / OUT: 790 mL / NET: -42.1 mL      LABS:  CBC Full  -  ( 30 Jul 2018 02:35 )  WBC Count : 7.73 K/uL  Hemoglobin : 8.4 g/dL  Hematocrit : 27.2 %  Platelet Count - Automated : 150 K/uL  Mean Cell Volume : 94.1 fl  Mean Cell Hemoglobin : 29.1 pg  Mean Cell Hemoglobin Concentration : 30.9 gm/dL  Auto Neutrophil # : x  Auto Lymphocyte # : x  Auto Monocyte # : x  Auto Eosinophil # : x  Auto Basophil # : x  Auto Neutrophil % : x  Auto Lymphocyte % : x  Auto Monocyte % : x  Auto Eosinophil % : x  Auto Basophil % : x    07-30    144  |  104  |  17  ----------------------------<  100<H>  3.4<L>   |  33<H>  |  0.52    Ca    8.3<L>      30 Jul 2018 02:35  Phos  2.9     07-30  Mg     1.8     07-30    TPro  6.3  /  Alb  1.7<L>  /  TBili  2.3<H>  /  DBili  2.01<H>  /  AST  34  /  ALT  26  /  AlkPhos  81  07-30    LIVER FUNCTIONS - ( 30 Jul 2018 02:35 )  Alb: 1.7 g/dL / Pro: 6.3 gm/dL / ALK PHOS: 81 U/L / ALT: 26 U/L / AST: 34 U/L / GGT: x             Sedimentation Rate, Erythrocyte: 39 mm/hr (07-26 @ 03:43)      Vancomycin Level, Trough: 17.6 ug/mL (07-28 @ 17:58)          MICROBIOLOGY:  Specimen Source: .Sputum Sputum (07-25 @ 22:57)  Culture Results:   No growth at 48 hours (07-25 @ 22:57)          Radiology:   < from: Xray Chest 1 View AP/PA. (07.28.18 @ 08:27) >  Findings: A left-sided IJ central line appears unchanged in position.    The patient is status post endotracheal tube placement in good position.   There is an NG Tube with its tip projecting below the diaphragm.    A left lower lobe opacity is notable compatible with atelectasis and/or   pneumonia and/or effusion. A trace right-sided pleural effusion is   notable. There is mild pulmonary vascular congestion. The heart size is   normal. There are mild multilevel degenerative changes ofthe thoracic   spine.     IMPRESSION: As above, no interval change.          Impression:  Clinically improving slowly now on present ab rx with Meropenem + Vanco +  will resume Diflucan for rx of Sepsis with Shock and Respiratory Failure secondary to Lt Emphysematous Pyelonephritis with underlying Lt Staghorn Calculus and Multi-focal PNA.  Now off pressors and extubated today with WBC's decreased and low-grade temps.  S/p initial episode of Rapid AFib and Cardioverted to RSR on admission.      Suggestions:  Will continue present ab rx and continue to follow temps and labs closely.  Follow-up CXR.  Discussed with patient at bedside. TMAX - 99.9    On day # 12 Meropenem / # 12 Vanco /  Diflucan  apparently d/c'ed on 7/26     Vital Signs Last 24 Hrs  T(C): 37.2 (30 Jul 2018 16:00), Max: 37.2 (30 Jul 2018 16:00)  T(F): 99 (30 Jul 2018 16:00), Max: 99 (30 Jul 2018 16:00)  HR: 50 (30 Jul 2018 18:00) (50 - 84)  BP: 124/84 (30 Jul 2018 18:00) (80/57 - 139/100)  BP(mean): 96 (30 Jul 2018 18:00) (52 - 120)  RR: 27 (30 Jul 2018 18:00) (0 - 27)  SpO2: 100% (30 Jul 2018 18:00) (85% - 100%)  Mode: CPAP with PS  FiO2: 30  PEEP: 5  PS: 5  Supplemental O2:  on NC O2 now      Awake, alert, extubated earlier today and now using NC O2.  Also with Levophed d/c'ed now as well with BP stable at present.  No c/o SOB or cp, and no c/o abdominal pain or back pain now.      PHYSICAL EXAM  General:  awake, alert, sitting upright in bed, coughing intermittently with marked congestion noted, using NC O2 now, in NAD at present  HEENT: conj pink, sclerae anicteric, PERRLA, no oral lesions noted - mostly edentulous with one tooth on the Lt lower jaw   Neck: supple, no nodes noted           LIJ CVP in place - site clean with dressing dry and intact - placed 7/25/18   Heart:  RR  Lungs: bilateral upper airway rhonchi   Abdomen:  BS+, soft, nontender, + edema of the abdominal wall  Extremities:  3+ edema LE's with chronic skin changes both LE's                    arms and hands remain markedly swollen                     LUE with Midline in place - site clean - placed 7/24  Skin:  warm dry, no rash noted        I&O's Summary :    29 Jul 2018 07:01  -  30 Jul 2018 07:00  --------------------------------------------------------  IN: 2767 mL / OUT: 4655 mL / NET: -1888 mL    30 Jul 2018 07:01  -  30 Jul 2018 18:57  --------------------------------------------------------  IN: 747.9 mL / OUT: 790 mL / NET: -42.1 mL      LABS:  CBC Full  -  ( 30 Jul 2018 02:35 )  WBC Count : 7.73 K/uL  Hemoglobin : 8.4 g/dL  Hematocrit : 27.2 %  Platelet Count - Automated : 150 K/uL  Mean Cell Volume : 94.1 fl  Mean Cell Hemoglobin : 29.1 pg  Mean Cell Hemoglobin Concentration : 30.9 gm/dL  Auto Neutrophil # : x  Auto Lymphocyte # : x  Auto Monocyte # : x  Auto Eosinophil # : x  Auto Basophil # : x  Auto Neutrophil % : x  Auto Lymphocyte % : x  Auto Monocyte % : x  Auto Eosinophil % : x  Auto Basophil % : x    07-30    144  |  104  |  17  ----------------------------<  100<H>  3.4<L>   |  33<H>  |  0.52    Ca    8.3<L>      30 Jul 2018 02:35  Phos  2.9     07-30  Mg     1.8     07-30    TPro  6.3  /  Alb  1.7<L>  /  TBili  2.3<H>  /  DBili  2.01<H>  /  AST  34  /  ALT  26  /  AlkPhos  81  07-30    LIVER FUNCTIONS - ( 30 Jul 2018 02:35 )  Alb: 1.7 g/dL / Pro: 6.3 gm/dL / ALK PHOS: 81 U/L / ALT: 26 U/L / AST: 34 U/L / GGT: x             Sedimentation Rate, Erythrocyte: 39 mm/hr (07-26 @ 03:43)      Vancomycin Level, Trough: 17.6 ug/mL (07-28 @ 17:58)          MICROBIOLOGY:  Specimen Source: .Sputum Sputum (07-25 @ 22:57)  Culture Results:   No growth at 48 hours (07-25 @ 22:57)          Radiology:   < from: Xray Chest 1 View AP/PA. (07.28.18 @ 08:27) >  Findings: A left-sided IJ central line appears unchanged in position.    The patient is status post endotracheal tube placement in good position.   There is an NG Tube with its tip projecting below the diaphragm.    A left lower lobe opacity is notable compatible with atelectasis and/or   pneumonia and/or effusion. A trace right-sided pleural effusion is   notable. There is mild pulmonary vascular congestion. The heart size is   normal. There are mild multilevel degenerative changes ofthe thoracic   spine.     IMPRESSION: As above, no interval change.          Impression:  Clinically improving slowly now on present ab rx with Meropenem + Vanco +  will resume Diflucan for rx of Sepsis with Shock and Respiratory Failure secondary to Lt Emphysematous Pyelonephritis with underlying Lt Staghorn Calculus and Multi-focal PNA.  Now off pressors and extubated today with WBC's decreased and low-grade temps.  S/p initial episode of Rapid AFib and Cardioverted to RSR on admission.      Suggestions:  Will continue present ab rx and continue to follow temps and labs closely.  Follow-up CXR.  Discussed with patient at bedside.

## 2018-07-30 NOTE — PROGRESS NOTE ADULT - ASSESSMENT
HPI:  See H and P for full details.  See in  ED noted to be in respiratory distress on Bipap with metabolic acidosis.  Given 2 amps bicarb and bicarb drip for metabolic acidosis, lactate 12. Spoke to her at length, she does not doctor has not seen and MD in many years. She was AAand 0 x 4 knows who the president is in spite of her hepatic encephalopathy, Denies ETOH/drugs/Hx of HIV or liver disease. Noted to be in fulminant hepatic failure, t bili 10.7, INR 2.76, AST 84  . Denies Tylenol consumption or any toxic ingestion. Post cardioversion for Afib with RVR earlier today, reportedly lost a pulse. In ICU, made decision to intubate . premedicated with 4 mg mversed, 100 mcg fent, started on propofol at 40. Intubated by myself on first attempt with glidescope 7.5 ETT to lip 21, placed RIJ TLC on first attempt. Bedside echo with RV dilation, severe TR, septal bowing, LV EF appears normal to slighlty decreased, I got LVOT VTI of 10 possibly indicating a component of LV failure that to me seems secondary to a primary RV proscess, possibly portopulmonary HTN. Basically my question is: is this a chronic liver picture causing portopulmonary HTN and RV failure or is this a primary RV failure causing hepatic congestion. Will check formal echo, fractionate the bilis, send HIV, hepatitis and autoimmune work up. Will treat the fulminant hepatic failure with N-acetylcycteine protocol which has good evidence behind it for all forms of fulminant hepatic failure, f/u a tylenol level, add lactulose for the hyperammonemia,Aztrenaom flagyl for intrabdominal proscess in setting of PCN allergey with unknown reaction, check urine electrolytes to R/O hepatorenal syndrome, levophed to keep MAP > 65 mmhg, Check formal echo, trend cardiac enzymes. GI, renal, cards consults. C/W bicarb drip for now is making some urine. Non contrast CT H/C/A/P to evaluate for sources of sepsis.  Prognosis is guarded. She told me she has a daughter named Osvaldo in [place] who she wishes to be her HCP, thou sounds like she may be sestranged from this daughter.  CCM time initial 46 min plus another 1 hour, total 1 hr and 46 minuted included recieving patient from ER, preparation for intubation  intubation, central line placement bedside echo.  ----------------------- as Above --------------------------------------------------------------------------------------------------  Np history obtainable besides above. Intubated unresponsive. Notes reviewed. See Ct scan / sonogram. Bilirubin shows improvement over a short period of time  ---------------- Elevated Bili > transaminases/alk phos  - [place]ad[place] to acute on chronic liver disease , VS side effects of medications. 1) supportive care  2) f/u LFTs 3) work up for underlying liver disease
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
## 2018-07-30 NOTE — PROGRESS NOTE ADULT - SUBJECTIVE AND OBJECTIVE BOX
Surgery NP note  Patient seen and examined bedside  in ICU    intubated and sedated    T(F): 98.8 (07-30-18 @ 05:48), Max: 99.9 (07-29-18 @ 11:30)  HR: 62 (07-30-18 @ 06:14) (50 - 84)  BP: 120/100 (07-30-18 @ 05:30) (78/54 - 148/107)  RR: 22 (07-30-18 @ 05:30) (14 - 30)  SpO2: 100% (07-30-18 @ 06:14) (85% - 100%)  Wt(kg): --  CAPILLARY BLOOD GLUCOSE      PHYSICAL EXAM:  General: NAD  Neuro:  Sedated  HEENT: NCAT, EOMI, conjunctiva clear  CV: +S1+S2 regular rate and rhythm  Lung: mech vent support  Abdomen: soft, Non tender, No Distension.    ; Lyons in situ with clear yellow urine 2970ml/24hrs    LABS:                        8.4    7.73  )-----------( 150      ( 30 Jul 2018 02:35 )             27.2     07-30    144  |  104  |  17  ----------------------------<  100<H>  3.4<L>   |  33<H>  |  0.52    Ca    8.3<L>      30 Jul 2018 02:35  Phos  2.9     07-30  Mg     1.8     07-30    TPro  6.3  /  Alb  1.7<L>  /  TBili  2.3<H>  /  DBili  2.01<H>  /  AST  34  /  ALT  26  /  AlkPhos  81  07-30    LIVER FUNCTIONS - ( 30 Jul 2018 02:35 )  Alb: 1.7 g/dL / Pro: 6.3 gm/dL / ALK PHOS: 81 U/L / ALT: 26 U/L / AST: 34 U/L / GGT: x             I&O's Detail    28 Jul 2018 07:01  -  29 Jul 2018 07:00  --------------------------------------------------------  IN:    fentaNYL Infusion.: 712 mL    norepinephrine Infusion: 162.6 mL    Solution: 200 mL    Solution: 300 mL    Solution: 500 mL    Vital HN: 1200 mL  Total IN: 3074.6 mL    OUT:    Indwelling Catheter - Urethral: 2970 mL  Total OUT: 2970 mL    Total NET: 104.6 mL      29 Jul 2018 07:01  -  30 Jul 2018 06:35  --------------------------------------------------------  IN:    fentaNYL Infusion.: 546.5 mL    norepinephrine Infusion: 112 mL    Solution: 100 mL    Solution: 250 mL    Vital HN: 950 mL  Total IN: 1958.5 mL    OUT:    Indwelling Catheter - Urethral: 4105 mL  Total OUT: 4105 mL    Total NET: -2146.5 mL      Impression: 54F a/w septic shock, SHARYN, respiratory failure,  due to left emphysematous pyelonephritis, found with staghorn calculous of left kidney on CT, urine culture 7/19 with + e coli, klebsiella, CNS and enterococcus faecalis. Intubated. On pressor support.   Plan:  - Continue antibiotics per ID  - Lyons catheter, monitor urine output, monitor renal function   - Continue ICU and cardiac care
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
## 2018-07-30 NOTE — PROGRESS NOTE ADULT - ASSESSMENT
53yo lady who presented to the ER in respiratory distress / metabolic acidosis / hepatic encephalopathy / hepatic failure.  Intubated after a ?PEA arrest s/p DCCV for Afib with RVR.  + septic shock due to emphysematous pyelonephritis with stag horn calculous in left kidney  Echo: LVEF 30%, RV enlargement, mod MR  Extubated but reintubated 2/2 acute pulm edema yesterday.  Not tolerating weaning trial.. tachy and hypotensive.  On levo.      -cont abx for septic shock  -HRs stable; unable to start any medications for rate control 2/2 hypotension requiring pressors.  -supportive care as per ICU team  -replete lytes  -monitor creat/LFTs  -remains quite edematous , agree with continued aggressive diuresis... 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

## 2018-07-30 NOTE — PROGRESS NOTE ADULT - SUBJECTIVE AND OBJECTIVE BOX
CARDIOLOGY PROGRESS NOTE      53 yo Female who presented to the ER in respiratory distress / metabolic acidosis / hepatic encephalopathy / hepatic failure.  Intubated after a ?PEA arrest s/p DCCV for Afib with RVR. 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  At times hypotensive remains on levo.  Appears to be following commands.       MEDICATIONS  (STANDING):  ALPRAZolam 0.25 milliGRAM(s) Oral at bedtime  chlorhexidine 0.12% Liquid 15 milliLiter(s) Swish and Spit two times a day  chlorhexidine 4% Liquid 1 Application(s) Topical <User Schedule>  fentaNYL   Infusion. 0.5 MICROgram(s)/kG/Hr (4.475 mL/Hr) IV Continuous <Continuous>  heparin  Injectable 5000 Unit(s) SubCutaneous every 8 hours  lidocaine 1% (Preservative-free) Injectable 20 milliLiter(s) Local Injection once  meropenem  IVPB 1000 milliGRAM(s) IV Intermittent every 8 hours  meropenem  IVPB      metolazone 10 milliGRAM(s) Oral once  norepinephrine Infusion 0.05 MICROgram(s)/kG/Min (8.391 mL/Hr) IV Continuous <Continuous>  pantoprazole   Suspension 40 milliGRAM(s) Oral daily  vancomycin  IVPB      vancomycin  IVPB 750 milliGRAM(s) IV Intermittent every 12 hours    MEDICATIONS  (PRN):      PHYSICAL EXAMINATION:  -----------------------------  Vital Signs Last 24 Hrs  T(C): 37.7 (29 Jul 2018 11:30), Max: 37.7 (29 Jul 2018 11:30)  T(F): 99.9 (29 Jul 2018 11:30), Max: 99.9 (29 Jul 2018 11:30)  HR: 60 (29 Jul 2018 13:30) (50 - 99)  BP: 102/71 (29 Jul 2018 13:30) (78/54 - 148/107)  BP(mean): 82 (29 Jul 2018 13:30) (59 - 119)  RR: 14 (29 Jul 2018 13:30) (14 - 30)  SpO2: 100% (29 Jul 2018 13:30) (85% - 100%)    Constitutional: intubated; moving arms and head when sedation off  Eyes: the conjunctiva exhibited no abnormalities and the eyelids demonstrated no xanthelasmas.   HEENT: normal oral mucosa, no oral pallor and no oral cyanosis.   Neck: normal jugular venous A waves present, normal jugular venous V waves present and no jugular venous mcknight A waves.   Pulmonary: no respiratory distress on vent support; decreased BS at bases with mild rales  Cardiovascular: irreg irreg; 2/6 SM  Abdomen: soft, non-tender, no hepato-splenomegaly  Musculoskeletal: the gait could not be assessed.  Extremities: no clubbing of the fingernails, no localized cyanosis  Skin: normal skin color and pigmentation, no rash, no venous stasis    LABS:   --------                          8.5    7.36  )-----------( 152      ( 29 Jul 2018 04:22 )             27.6   07-29    144  |  106  |  15  ----------------------------<  92  3.4<L>   |  30  |  0.48<L>    Ca    7.8<L>      29 Jul 2018 04:22  Phos  3.1     07-29  Mg     1.8     07-29    TPro  6.4  /  Alb  1.7<L>  /  TBili  2.4<H>  /  DBili  x   /  AST  34  /  ALT  29  /  AlkPhos  84  07-29        INTERPRETATION:  CT of the chest, abdomen, and pelvis without contrast    Indication: Shock. Respiratory failure. Jaundice. Elevated bilirubin.    Technique: Axial multidetector CT images of the chest, abdomen, and   pelvis are acquired without IV or oral contrast.    Comparison: None.    Findings:    Chest: Mild bilateral pleural effusions. Small right pericardial   effusion. Cardiomegaly. No aortic aneurysm. Allowing for the noncontrast   technique, there is no grossly enlarged mediastinal, hilar, or axillary   lymph node.    ET tube terminates above the nivia. NG tube terminates in the stomach.    Small densities in the right mainstem bronchus may represent mucous   material.    Mild emphysematous changes in the right lung, suggestive of COPD. Small   bilateral atelectasis. Mild hazy groundglass densities in both lungs may   represent pulmonary edema or pneumonia. No evidence for pneumothorax.    Abdomen: Evaluation of the abdomen and pelvis is limited by lack of IV   and oral contrast. Allowing for noncontrast technique, the liver,   pancreas, spleen, and the right kidney appear grossly unremarkable. There   is a staghorn calculus in the left kidney. Apparent air in the left renal   calyces. No hydronephrosis.    Nonspecific adrenal thickening bilaterally.    The gallbladder appears contracted. Pericholecystic fluid versus   gallbladder wall edema. The common bile duct is not visualized on this   limited examination. If clinically indicated, abdominal ultrasound may be   pursued for further evaluation.    No bowel obstruction, or grossly thickened bowel wall. The appendix is   not identified. No evidence free air, or enlarged lymph node. Mild to   moderate ascites in the abdomen and pelvis.    Pelvis: There is a Lyons catheter in the urinary bladder which contains   air. The urinary bladder is underdistended, rendering evaluation   difficult. The bowel structures appear grossly unremarkable. No grossly   enlarged pelvic lymph node.    There is diffuse body wall edema in the chest, abdomen, and pelvis.    Impression: Anasarca.    Mild bilateral pleural effusions. Small right pericardial effusion.    Small densities in the right mainstem bronchus may represent mucous   material. Follow-up chest CT may be pursued in 4-6 weeks to ensure   clearance.    Mild hazy groundglass densities in both lungs may represent pulmonary   edema or pneumonia. Clinical correlation is recommended.    The gallbladder appears contracted. Pericholecystic fluid versus   gallbladder wall edema. The common bile duct is not visualized on this   limited examination. If clinically indicated, abdominal ultrasound may be   pursued for further evaluation. Alternatively, abdominal MR without and   with IV contrast including MRCP may be pursued.    No bowel obstruction, or grossly thickened bowel wall. The appendix is   not identified.    Mild to moderate ascites in the abdomen and pelvis.    Lyons catheter in the urinary bladder. Associated air in the urinary   bladder. Staghorn calculus in the left kidney. Apparent air in the left   renal calyces may be due to reflux from the urinary bladder or may   represent emphysematous pyelitis. Clinical correlation is recommended.    < end of copied text >    < from: US Duplex Venous Lower Ext Complete, Bilateral (07.19.18 @ 16:51) >  IMPRESSION:     No evidence of bilateral lower extremity deep venous thrombosis.    < end of copied text >        < from: TTE Echo Doppler w/o Cont (07.19.18 @ 19:57) >   1. Left ventricular ejection fraction, by visual estimation, is 30 to   35%.   2. There is no left ventricular hypertrophy.   3. Biatrial enlargement.   4. Right ventricular dilatation.   5. Moderate mitral valve regurgitation.   6. Mild tricuspid regurgitation.   7. Pulmonic valve regurgitation.   8. Moderately to severely decreased global left ventricular systolic   function.      < end of copied text >

## 2018-07-31 LAB
ALBUMIN SERPL ELPH-MCNC: 1.9 G/DL — LOW (ref 3.3–5)
ALP SERPL-CCNC: 76 U/L — SIGNIFICANT CHANGE UP (ref 40–120)
ALT FLD-CCNC: 25 U/L — SIGNIFICANT CHANGE UP (ref 12–78)
ANION GAP SERPL CALC-SCNC: 9 MMOL/L — SIGNIFICANT CHANGE UP (ref 5–17)
AST SERPL-CCNC: 37 U/L — SIGNIFICANT CHANGE UP (ref 15–37)
BILIRUB SERPL-MCNC: 2.5 MG/DL — HIGH (ref 0.2–1.2)
BUN SERPL-MCNC: 16 MG/DL — SIGNIFICANT CHANGE UP (ref 7–23)
CALCIUM SERPL-MCNC: 8.5 MG/DL — SIGNIFICANT CHANGE UP (ref 8.5–10.1)
CHLORIDE SERPL-SCNC: 101 MMOL/L — SIGNIFICANT CHANGE UP (ref 96–108)
CO2 SERPL-SCNC: 32 MMOL/L — HIGH (ref 22–31)
CREAT SERPL-MCNC: 0.54 MG/DL — SIGNIFICANT CHANGE UP (ref 0.5–1.3)
GLUCOSE SERPL-MCNC: 127 MG/DL — HIGH (ref 70–99)
HCT VFR BLD CALC: 29.5 % — LOW (ref 34.5–45)
HGB BLD-MCNC: 9 G/DL — LOW (ref 11.5–15.5)
MAGNESIUM SERPL-MCNC: 1.9 MG/DL — SIGNIFICANT CHANGE UP (ref 1.6–2.6)
MCHC RBC-ENTMCNC: 29 PG — SIGNIFICANT CHANGE UP (ref 27–34)
MCHC RBC-ENTMCNC: 30.5 GM/DL — LOW (ref 32–36)
MCV RBC AUTO: 95.2 FL — SIGNIFICANT CHANGE UP (ref 80–100)
NRBC # BLD: 0 /100 WBCS — SIGNIFICANT CHANGE UP (ref 0–0)
PHOSPHATE SERPL-MCNC: 3.2 MG/DL — SIGNIFICANT CHANGE UP (ref 2.5–4.5)
PLATELET # BLD AUTO: 170 K/UL — SIGNIFICANT CHANGE UP (ref 150–400)
POTASSIUM SERPL-MCNC: 3.4 MMOL/L — LOW (ref 3.5–5.3)
POTASSIUM SERPL-SCNC: 3.4 MMOL/L — LOW (ref 3.5–5.3)
PROT SERPL-MCNC: 6.8 GM/DL — SIGNIFICANT CHANGE UP (ref 6–8.3)
RBC # BLD: 3.1 M/UL — LOW (ref 3.8–5.2)
RBC # FLD: 21.7 % — HIGH (ref 10.3–14.5)
SODIUM SERPL-SCNC: 142 MMOL/L — SIGNIFICANT CHANGE UP (ref 135–145)
VANCOMYCIN TROUGH SERPL-MCNC: 16.1 UG/ML — SIGNIFICANT CHANGE UP (ref 10–20)
WBC # BLD: 6.24 K/UL — SIGNIFICANT CHANGE UP (ref 3.8–10.5)
WBC # FLD AUTO: 6.24 K/UL — SIGNIFICANT CHANGE UP (ref 3.8–10.5)

## 2018-07-31 PROCEDURE — 71045 X-RAY EXAM CHEST 1 VIEW: CPT | Mod: 26

## 2018-07-31 RX ORDER — FUROSEMIDE 40 MG
40 TABLET ORAL ONCE
Qty: 0 | Refills: 0 | Status: DISCONTINUED | OUTPATIENT
Start: 2018-07-31 | End: 2018-07-31

## 2018-07-31 RX ORDER — POTASSIUM CHLORIDE 20 MEQ
10 PACKET (EA) ORAL
Qty: 0 | Refills: 0 | Status: COMPLETED | OUTPATIENT
Start: 2018-07-31 | End: 2018-07-31

## 2018-07-31 RX ADMIN — Medication 100 MILLIEQUIVALENT(S): at 07:00

## 2018-07-31 RX ADMIN — MEROPENEM 100 MILLIGRAM(S): 1 INJECTION INTRAVENOUS at 21:48

## 2018-07-31 RX ADMIN — Medication 100 MILLIEQUIVALENT(S): at 08:45

## 2018-07-31 RX ADMIN — CHLORHEXIDINE GLUCONATE 1 APPLICATION(S): 213 SOLUTION TOPICAL at 05:00

## 2018-07-31 RX ADMIN — Medication 100 MILLIEQUIVALENT(S): at 06:39

## 2018-07-31 RX ADMIN — Medication 250 MILLIGRAM(S): at 09:00

## 2018-07-31 RX ADMIN — MEROPENEM 100 MILLIGRAM(S): 1 INJECTION INTRAVENOUS at 13:37

## 2018-07-31 RX ADMIN — Medication 100 MILLIGRAM(S): at 06:39

## 2018-07-31 RX ADMIN — MEROPENEM 100 MILLIGRAM(S): 1 INJECTION INTRAVENOUS at 06:39

## 2018-07-31 RX ADMIN — FLUCONAZOLE 100 MILLIGRAM(S): 150 TABLET ORAL at 19:14

## 2018-07-31 RX ADMIN — Medication 100 MILLIGRAM(S): at 13:37

## 2018-07-31 RX ADMIN — Medication 250 MILLIGRAM(S): at 19:36

## 2018-07-31 RX ADMIN — Medication 100 MILLIGRAM(S): at 21:48

## 2018-07-31 RX ADMIN — Medication 20 MILLIGRAM(S): at 06:40

## 2018-07-31 NOTE — PROGRESS NOTE ADULT - ASSESSMENT
53 yo F arrived with septic shock, with acute kidney injury acute respiratory failure, with persistent lactic acidosis in the setting of emphysematous pyelonephritis. Also found to have liver failure with ascites and anasarca likely sec to congestive heart failure? EF 30-35%.      Neuro: awake and alert today  CV: Septic shock, resolved , off pressors.  cont gentle diuresis with lasix.  Pulm: doing well post extubation. on NC.   GI: tolerating diet  Renal/Metabolic: Acute emphysematous pyelonephritis, with acute kidney injury now resolved; follow up urology. repeat CT abdomen today as per Dr. Frances.  monitor and replete electrolytes  ID: Emphysematous pyelo, with Ecoli , enterococcus and kleb in urine, cont on Vancomycin, fluconazol and Meropenem; followup ID recs   Endo: No acute issues  DVT/GI ppx    Critical Care Time Spent 35 min.

## 2018-07-31 NOTE — PROGRESS NOTE ADULT - SUBJECTIVE AND OBJECTIVE BOX
TMAX - 99    On day # 13 Meropenem / # 13 Vanco / # 10 Diflucan     Vital Signs Last 24 Hrs  T(C): 36.1 (31 Jul 2018 08:00), Max: 37.2 (30 Jul 2018 16:00)  T(F): 97 (31 Jul 2018 08:00), Max: 99 (30 Jul 2018 16:00)  HR: 83 (31 Jul 2018 10:00) (44 - 118)  BP: 112/91 (31 Jul 2018 10:00) (106/78 - 155/86)  BP(mean): 100 (31 Jul 2018 10:00) (72 - 116)  RR: 23 (31 Jul 2018 10:00) (0 - 28)  SpO2: 72% (31 Jul 2018 10:00) (70% - 100%)  Supplemental O2:  on RA now    Awake, alert, sitting upright in bed. No c/o SOB, cp, or abdominal pain now.  Claims her cough is decreasing as well and she ate oatmeal today. Remains off pressors and presently not using any O2.      PHYSICAL EXAM  General:  awake, alert, sitting upright in bed now appears comfortable and in NAD  HEENT:  conj pink, sclerae anicteric, PERRLA, no oral lesions noted  Neck:  supple, no nodes noted            LIJ CVP in place - site clean with dressing dry and intact - placed 7/25  Heart: RR  Lungs:  decreased BS at bases bilaterally with few rhonchi anteriorly  Abdomen:  BS+, semi-soft, nontender to palpation, + edema of the abdominal wall  Extremities:  3+ edema LE's                     some decrease swelling of both arms and hands now  Skin:  warm, dry, no rash noted  Lyons remains in place with slightly cloudy urine with flecks of blood noted in the tubing       I&O's Summary :    30 Jul 2018 07:01  -  31 Jul 2018 07:00  --------------------------------------------------------  IN: 1347.9 mL / OUT: 1890 mL / NET: -542.1 mL    31 Jul 2018 07:01  -  31 Jul 2018 12:24  --------------------------------------------------------  IN: 100 mL / OUT: 500 mL / NET: -400 mL      LABS:  CBC Full  -  ( 31 Jul 2018 04:25 )  WBC Count : 6.24 K/uL  Hemoglobin : 9.0 g/dL  Hematocrit : 29.5 %  Platelet Count - Automated : 170 K/uL  Mean Cell Volume : 95.2 fl  Mean Cell Hemoglobin : 29.0 pg  Mean Cell Hemoglobin Concentration : 30.5 gm/dL  Auto Neutrophil # : x  Auto Lymphocyte # : x  Auto Monocyte # : x  Auto Eosinophil # : x  Auto Basophil # : x  Auto Neutrophil % : x  Auto Lymphocyte % : x  Auto Monocyte % : x  Auto Eosinophil % : x  Auto Basophil % : x    07-31    142  |  101  |  16  ----------------------------<  127<H>  3.4<L>   |  32<H>  |  0.54    Ca    8.5      31 Jul 2018 04:25  Phos  3.2     07-31  Mg     1.9     07-31    TPro  6.8  /  Alb  1.9<L>  /  TBili  2.5<H>  /  DBili  x   /  AST  37  /  ALT  25  /  AlkPhos  76  07-31    LIVER FUNCTIONS - ( 31 Jul 2018 04:25 )  Alb: 1.9 g/dL / Pro: 6.8 gm/dL / ALK PHOS: 76 U/L / ALT: 25 U/L / AST: 37 U/L / GGT: x             Vancomycin Level, Trough: 16.1 ug/mL (07-31 @ 06:26)          MICROBIOLOGY:  Specimen Source: .Sputum Sputum (07-25 @ 22:57)  Culture Results:   No growth at 48 hours (07-25 @ 22:57)          Radiology:  CXR - < from: Xray Chest 1 View AP/PA. (07.31.18 @ 08:01) >  COMPARISON: 7/28/2018    FINDINGS:     Endotracheal and gastric tubes have been removed. There is no change in   position and appearance of the left internal jugular central venous   catheter.    There are small pleural effusions and mild pulmonary vascular congestion.   The cardiac and mediastinal contours are prominent, which may be due to   magnification from AP technique and shallow inspiration. The osseous   structures are intact.    IMPRESSION:    Small pleural effusions and pulmonary vascular congestion.        Impression:  Clinically slowly improving on present ab rx with Meropenem + Vanco + Diflucan for rx of Sepsis with initial Shock and Respiratory Failure secondary to Lt Emphysematous Pyelonephritis with underlying Staghorn Calculus and Multifocal PNA.  LFT's and WBC's have improved and patient remains in RSR after being Cardioverted in the ER for Rapid AFib.       Suggestions:  Will continue current ab rx and continue to follow-up temps and labs closely.  Discussed with patient in detail at bedside. TMAX - 99    On day # 13 Meropenem / # 13 Vanco / # 10 Diflucan     Vital Signs Last 24 Hrs  T(C): 36.1 (31 Jul 2018 08:00), Max: 37.2 (30 Jul 2018 16:00)  T(F): 97 (31 Jul 2018 08:00), Max: 99 (30 Jul 2018 16:00)  HR: 83 (31 Jul 2018 10:00) (44 - 118)  BP: 112/91 (31 Jul 2018 10:00) (106/78 - 155/86)  BP(mean): 100 (31 Jul 2018 10:00) (72 - 116)  RR: 23 (31 Jul 2018 10:00) (0 - 28)  SpO2: 72% (31 Jul 2018 10:00) (70% - 100%)  Supplemental O2:  on RA now    Awake, alert, sitting upright in bed. No c/o SOB, cp, or abdominal pain now.  Claims her cough is decreasing as well and she ate oatmeal today. Remains off pressors and presently not using any O2.      PHYSICAL EXAM  General:  awake, alert, sitting upright in bed now appears comfortable and in NAD  HEENT:  conj pink, sclerae anicteric, PERRLA, no oral lesions noted  Neck:  supple, no nodes noted            LIJ CVP in place - site clean with dressing dry and intact - placed 7/25  Heart: RR  Lungs:  decreased BS at bases bilaterally with few rhonchi anteriorly  Abdomen:  BS+, semi-soft, nontender to palpation, + edema of the abdominal wall  Extremities:  3+ edema LE's                     some decrease swelling of both arms and hands now                     LUE with Midline in place - dressing dry and intact - placed 7/24  Skin:  warm, dry, no rash noted  Lyons remains in place with slightly cloudy urine with flecks of blood noted in the tubing       I&O's Summary :    30 Jul 2018 07:01  -  31 Jul 2018 07:00  --------------------------------------------------------  IN: 1347.9 mL / OUT: 1890 mL / NET: -542.1 mL    31 Jul 2018 07:01  -  31 Jul 2018 12:24  --------------------------------------------------------  IN: 100 mL / OUT: 500 mL / NET: -400 mL      LABS:  CBC Full  -  ( 31 Jul 2018 04:25 )  WBC Count : 6.24 K/uL  Hemoglobin : 9.0 g/dL  Hematocrit : 29.5 %  Platelet Count - Automated : 170 K/uL  Mean Cell Volume : 95.2 fl  Mean Cell Hemoglobin : 29.0 pg  Mean Cell Hemoglobin Concentration : 30.5 gm/dL  Auto Neutrophil # : x  Auto Lymphocyte # : x  Auto Monocyte # : x  Auto Eosinophil # : x  Auto Basophil # : x  Auto Neutrophil % : x  Auto Lymphocyte % : x  Auto Monocyte % : x  Auto Eosinophil % : x  Auto Basophil % : x    07-31    142  |  101  |  16  ----------------------------<  127<H>  3.4<L>   |  32<H>  |  0.54    Ca    8.5      31 Jul 2018 04:25  Phos  3.2     07-31  Mg     1.9     07-31    TPro  6.8  /  Alb  1.9<L>  /  TBili  2.5<H>  /  DBili  x   /  AST  37  /  ALT  25  /  AlkPhos  76  07-31    LIVER FUNCTIONS - ( 31 Jul 2018 04:25 )  Alb: 1.9 g/dL / Pro: 6.8 gm/dL / ALK PHOS: 76 U/L / ALT: 25 U/L / AST: 37 U/L / GGT: x             Vancomycin Level, Trough: 16.1 ug/mL (07-31 @ 06:26)          MICROBIOLOGY:  Specimen Source: .Sputum Sputum (07-25 @ 22:57)  Culture Results:   No growth at 48 hours (07-25 @ 22:57)          Radiology:  CXR - < from: Xray Chest 1 View AP/PA. (07.31.18 @ 08:01) >  COMPARISON: 7/28/2018    FINDINGS:     Endotracheal and gastric tubes have been removed. There is no change in   position and appearance of the left internal jugular central venous   catheter.    There are small pleural effusions and mild pulmonary vascular congestion.   The cardiac and mediastinal contours are prominent, which may be due to   magnification from AP technique and shallow inspiration. The osseous   structures are intact.    IMPRESSION:    Small pleural effusions and pulmonary vascular congestion.        Impression:  Clinically slowly improving on present ab rx with Meropenem + Vanco + Diflucan for rx of Sepsis with initial Shock and Respiratory Failure secondary to Lt Emphysematous Pyelonephritis with underlying Staghorn Calculus and Multifocal PNA.  LFT's and WBC's have improved and patient remains in RSR after being Cardioverted in the ER for Rapid AFib.       Suggestions:  Will continue current ab rx and continue to follow-up temps and labs closely.  Discussed with patient in detail at bedside.
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
## 2018-07-31 NOTE — PROGRESS NOTE ADULT - SUBJECTIVE AND OBJECTIVE BOX
Patient seen and examined bedside resting comfortably, Feeling much better, extubated  Awake responsive  Has Lyons in place  Denies nausea and vomiting.  Denies chest pain, dyspnea, cough.    T(F): 97.7 (07-31-18 @ 05:25), Max: 99 (07-30-18 @ 16:00)  HR: 75 (07-31-18 @ 05:00) (44 - 118)  BP: 129/101 (07-31-18 @ 05:00) (96/68 - 155/86)  RR: 28 (07-31-18 @ 05:00) (0 - 28)  SpO2: 100% (07-31-18 @ 04:00) (93% - 100%)  Wt(kg): --  CAPILLARY BLOOD GLUCOSE      PHYSICAL EXAM:  General: NAD, alert and awake  HEENT: NCAT, EOMI, conjunctiva clear  Chest: nonlabored respirations, good inspiratory effort  Abdomen: soft, NTND.   Extremities: no pedal edema or calf tenderness noted   : no sp or CVA tenderness, Lyons in situ with clear yellow urine 1590ml/24hrs    LABS:                        9.0    6.24  )-----------( 170      ( 31 Jul 2018 04:25 )             29.5   07-31    142  |  101  |  16  ----------------------------<  127<H>  3.4<L>   |  32<H>  |  0.54    Ca    8.5      31 Jul 2018 04:25  Phos  3.2     07-31  Mg     1.9     07-31    TPro  6.8  /  Alb  1.9<L>  /  TBili  2.5<H>  /  DBili  x   /  AST  37  /  ALT  25  /  AlkPhos  76  07-31    I&O's Detail    29 Jul 2018 07:01  -  30 Jul 2018 07:00  --------------------------------------------------------  IN:    fentaNYL Infusion.: 671.5 mL    norepinephrine Infusion: 145.5 mL    Solution: 100 mL    Solution: 500 mL    Solution: 150 mL    Vital HN: 1200 mL  Total IN: 2767 mL    OUT:    Indwelling Catheter - Urethral: 4655 mL  Total OUT: 4655 mL    Total NET: -1888 mL      30 Jul 2018 07:01  -  31 Jul 2018 06:12  --------------------------------------------------------  IN:    fentaNYL Infusion.: 27 mL    norepinephrine Infusion: 50.9 mL    Oral Fluid: 520 mL    Solution: 250 mL    Solution: 100 mL    Vital HN: 250 mL  Total IN: 1197.9 mL    OUT:    Indwelling Catheter - Urethral: 1590 mL  Total OUT: 1590 mL    Total NET: -392.1 mL      Impression: 54F a/w septic shock, SHARYN, respiratory failure,  due to left emphysematous pyelonephritis, found with staghorn calculous of left kidney on CT, urine culture 7/19 with + e coli, klebsiella, CNS and enterococcus faecalis. Now extubated  Plan:  - Continue antibiotics per ID  - Lyons catheter, monitor urine output, monitor renal function   - Continue ICU and cardiac care
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
## 2018-07-31 NOTE — PROGRESS NOTE ADULT - PROBLEM SELECTOR PLAN 1
Bili 2.5   [id number]- relatively unchanged.   No signs of hemochromatosis. MAZIN  1 : 320 butiliruben improving without any direct treatment.  - Supportive care for now ( follow up labs ordered )

## 2018-07-31 NOTE — PROGRESS NOTE ADULT - ASSESSMENT
HPI:  See H and P for full details.  See in  ED noted to be in respiratory distress on Bipap with metabolic acidosis.  Given 2 amps bicarb and bicarb drip for metabolic acidosis, lactate 12. Spoke to her at length, she does not doctor has not seen and MD in many years. She was AAand 0 x 4 knows who the president is in spite of her hepatic encephalopathy, Denies ETOH/drugs/Hx of HIV or liver disease. Noted to be in fulminant hepatic failure, t bili 10.7, INR 2.76, AST 84  . Denies Tylenol consumption or any toxic ingestion. Post cardioversion for Afib with RVR earlier today, reportedly lost a pulse. In ICU, made decision to intubate . premedicated with 4 mg mversed, 100 mcg fent, started on propofol at 40. Intubated by myself on first attempt with glidescope 7.5 ETT to lip 21, placed RIJ TLC on first attempt. Bedside echo with RV dilation, severe TR, septal bowing, LV EF appears normal to slighlty decreased, I got LVOT VTI of 10 possibly indicating a component of LV failure that to me seems secondary to a primary RV proscess, possibly portopulmonary HTN. Basically my question is: is this a chronic liver picture causing portopulmonary HTN and RV failure or is this a primary RV failure causing hepatic congestion. Will check formal echo, fractionate the bilis, send HIV, hepatitis and autoimmune work up. Will treat the fulminant hepatic failure with N-acetylcycteine protocol which has good evidence behind it for all forms of fulminant hepatic failure, f/u a tylenol level, add lactulose for the hyperammonemia,Aztrenaom flagyl for intrabdominal proscess in setting of PCN allergey with unknown reaction, check urine electrolytes to R/O hepatorenal syndrome, levophed to keep MAP > 65 mmhg, Check formal echo, trend cardiac enzymes. GI, renal, cards consults. C/W bicarb drip for now is making some urine. Non contrast CT H/C/A/P to evaluate for sources of sepsis.  Prognosis is guarded. She told me she has a daughter named Osvaldo in [place] who she wishes to be her HCP, thou sounds like she may be sestranged from this daughter.  CCM time initial 46 min plus another 1 hour, total 1 hr and 46 minuted included recieving patient from ER, preparation for intubation  intubation, central line placement bedside echo.  ----------------------- as Above --------------------------------------------------------------------------------------------------  Np history obtainable besides above. Intubated unresponsive. Notes reviewed. See Ct scan / sonogram. Bilirubin shows improvement over a short period of time  ---------------- Elevated Bili > transaminases/alk phos  - Seconadry to acute on chronic liver disease , VS side effects of medications. 1) supportive care  2) f/u LFTs 3) work up for underlying liver disease
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
## 2018-08-01 DIAGNOSIS — F43.20 ADJUSTMENT DISORDER, UNSPECIFIED: ICD-10-CM

## 2018-08-01 PROCEDURE — 93306 TTE W/DOPPLER COMPLETE: CPT | Mod: 26

## 2018-08-01 PROCEDURE — 99232 SBSQ HOSP IP/OBS MODERATE 35: CPT

## 2018-08-01 PROCEDURE — 71045 X-RAY EXAM CHEST 1 VIEW: CPT | Mod: 26

## 2018-08-01 PROCEDURE — 99233 SBSQ HOSP IP/OBS HIGH 50: CPT

## 2018-08-01 PROCEDURE — 99223 1ST HOSP IP/OBS HIGH 75: CPT

## 2018-08-01 PROCEDURE — 74176 CT ABD & PELVIS W/O CONTRAST: CPT | Mod: 26

## 2018-08-01 RX ORDER — ASPIRIN/CALCIUM CARB/MAGNESIUM 324 MG
81 TABLET ORAL DAILY
Qty: 0 | Refills: 0 | Status: DISCONTINUED | OUTPATIENT
Start: 2018-08-01 | End: 2018-08-17

## 2018-08-01 RX ORDER — FUROSEMIDE 40 MG
40 TABLET ORAL DAILY
Qty: 0 | Refills: 0 | Status: DISCONTINUED | OUTPATIENT
Start: 2018-08-02 | End: 2018-08-03

## 2018-08-01 RX ORDER — FUROSEMIDE 40 MG
20 TABLET ORAL ONCE
Qty: 0 | Refills: 0 | Status: COMPLETED | OUTPATIENT
Start: 2018-08-01 | End: 2018-08-01

## 2018-08-01 RX ORDER — CARVEDILOL PHOSPHATE 80 MG/1
6.25 CAPSULE, EXTENDED RELEASE ORAL EVERY 12 HOURS
Qty: 0 | Refills: 0 | Status: DISCONTINUED | OUTPATIENT
Start: 2018-08-01 | End: 2018-08-02

## 2018-08-01 RX ORDER — LOSARTAN POTASSIUM 100 MG/1
25 TABLET, FILM COATED ORAL DAILY
Qty: 0 | Refills: 0 | Status: DISCONTINUED | OUTPATIENT
Start: 2018-08-01 | End: 2018-08-02

## 2018-08-01 RX ADMIN — Medication 250 MILLIGRAM(S): at 18:11

## 2018-08-01 RX ADMIN — CARVEDILOL PHOSPHATE 6.25 MILLIGRAM(S): 80 CAPSULE, EXTENDED RELEASE ORAL at 18:11

## 2018-08-01 RX ADMIN — LOSARTAN POTASSIUM 25 MILLIGRAM(S): 100 TABLET, FILM COATED ORAL at 18:17

## 2018-08-01 RX ADMIN — Medication 20 MILLIGRAM(S): at 18:12

## 2018-08-01 RX ADMIN — MEROPENEM 100 MILLIGRAM(S): 1 INJECTION INTRAVENOUS at 22:50

## 2018-08-01 RX ADMIN — Medication 250 MILLIGRAM(S): at 06:10

## 2018-08-01 RX ADMIN — Medication 100 MILLIGRAM(S): at 05:29

## 2018-08-01 RX ADMIN — Medication 81 MILLIGRAM(S): at 18:12

## 2018-08-01 RX ADMIN — Medication 20 MILLIGRAM(S): at 05:30

## 2018-08-01 RX ADMIN — MEROPENEM 100 MILLIGRAM(S): 1 INJECTION INTRAVENOUS at 14:23

## 2018-08-01 RX ADMIN — CHLORHEXIDINE GLUCONATE 1 APPLICATION(S): 213 SOLUTION TOPICAL at 06:02

## 2018-08-01 RX ADMIN — FLUCONAZOLE 100 MILLIGRAM(S): 150 TABLET ORAL at 21:30

## 2018-08-01 RX ADMIN — MEROPENEM 100 MILLIGRAM(S): 1 INJECTION INTRAVENOUS at 05:31

## 2018-08-01 NOTE — BEHAVIORAL HEALTH ASSESSMENT NOTE - SUMMARY
Patient is a 53 yo woman with no known PMH of ground Hepatomegaly, nonspecific gallbladder wall thickening. Left nephrolithiasis. Pt. initially admitted to the ICU for sepsis,  intubated on mechanical ventilation, now s/p extubation and transferred to floor. Ct abdomen showed Staghorn calculus in the left kidney, pt. needs repeat CT abdomen to r/o pyelonephritis. Psychiatry consulted as pt. is refusing CT abdomen.    Patient seen and evaluated, AAOX3, overall calm and linear but at times appears very upset and irritable. Patient stated that due to severe infections she was admitted to ICU first. Patient also knows that she has been on IV antibiotics. Patient stated that she knows that she need CT abdomen to see if she has any infection and stated that she is willing to go for CT abdomen, however pt. stated that the tube was placed in her rectum due to diarrhea and she want that tube to be removed first. Patient denies SI and HI, denies AH and VH. Some paranoia noted, pt. stated that " no one is treating me well due to my color". Reassurance was provided.   At present pt. is agreeing for CT abdomen and has capacity to make this decision. Primary team will talk to the pt. to reassure her that she does not have any rectal tube. If pt. refuses CT abdomen even after reassurance, please call psychiatry. Presentation is c/w adjustment disorder, r/o underlying personality disorder.    Plan:  Not sure why pt. is on standing Xanax, please check Istop. If it was just recently started and pt. is not on it as oupt. may discontinue.  Seroquel 25mg PO for q6h PRN mild agitation if qtc <500   Haldol 2mg IM/IV q6h PRN for severe agitation if QTC <500 Patient is a 55 yo woman with PMH of ground Hepatomegaly, nonspecific gallbladder wall thickening. Left nephrolithiasis. Pt. initially admitted to the ICU for sepsis,  intubated on mechanical ventilation, now s/p extubation and transferred to floor. Ct abdomen showed Staghorn calculus in the left kidney, pt. needs repeat CT abdomen to r/o pyelonephritis. Psychiatry consulted as pt. is refusing CT abdomen.    Patient seen and evaluated, AAOX3, overall calm and linear but at times appears very upset and irritable. Patient stated that due to severe infections she was admitted to ICU first. Patient also knows that she has been on IV antibiotics. Patient stated that she knows that she need CT abdomen to see if she has any infection and stated that she is willing to go for CT abdomen, however pt. stated that the tube was placed in her rectum due to diarrhea and she want that tube to be removed first. Patient denies SI and HI, denies AH and VH. Some paranoia noted, pt. stated that " no one is treating me well due to my color". Reassurance was provided.   At present pt. is agreeing for CT abdomen and has capacity to make this decision. Primary team will talk to the pt. to reassure her that she does not have any rectal tube. If pt. refuses CT abdomen even after reassurance, please call psychiatry. Presentation is c/w adjustment disorder, r/o underlying personality disorder.    Plan:  Not sure why pt. is on standing Xanax, please check Istop. If it was just recently started and pt. is not on it as oupt. may discontinue.  Seroquel 25mg PO for q6h PRN mild agitation if qtc <500   Haldol 2mg IM/IV q6h PRN for severe agitation if QTC <500 Patient is a 55 yo woman with PMH of ground Hepatomegaly, nonspecific gallbladder wall thickening. Left nephrolithiasis. Pt. initially admitted to the ICU for sepsis,  intubated on mechanical ventilation, now s/p extubation and transferred to floor. Ct abdomen showed Staghorn calculus in the left kidney, pt. needs repeat CT abdomen to r/o pyelonephritis. Psychiatry consulted as pt. is refusing CT abdomen.    Patient seen and evaluated, AAOX3, overall calm and linear but at times appears very upset and irritable. Patient stated that due to severe infections she was admitted to ICU first. Patient also knows that she has been on IV antibiotics. Patient stated that she knows that she need CT abdomen to see if she has any infection and stated that she is willing to go for CT abdomen, however pt. stated that the tube was placed in her rectum due to diarrhea and she want that tube to be removed first. Patient denies SI and HI, denies AH and VH. Some paranoia noted, pt. stated that " no one is treating me well due to my color". Reassurance was provided.   At present pt. is agreeing for CT abdomen and has capacity to make this decision. Primary team will talk to the pt. to reassure her that she does not have any rectal tube. If pt. refuses CT abdomen even after reassurance, please call psychiatry. Presentation is c/w adjustment disorder, r/o underlying personality disorder.    Plan:  Not sure why pt. is on low dose of standing Xanax, please check Istop. If it was just recently started and pt. is not on it as oupt. may discontinue.  Seroquel 25mg PO q6h PRN mild agitation if qtc <500   Haldol 2mg IM/IV q6h PRN for severe agitation if QTC <500

## 2018-08-01 NOTE — BEHAVIORAL HEALTH ASSESSMENT NOTE - NSBHCHARTREVIEWVS_PSY_A_CORE FT
ICU Vital Signs Last 24 Hrs  T(C): 36.7 (01 Aug 2018 11:08), Max: 36.7 (01 Aug 2018 11:08)  T(F): 98 (01 Aug 2018 11:08), Max: 98 (01 Aug 2018 11:08)  HR: 78 (01 Aug 2018 11:08) (69 - 111)  BP: 146/93 (01 Aug 2018 11:08) (137/68 - 159/102)  BP(mean): --  ABP: --  ABP(mean): --  RR: 18 (01 Aug 2018 11:08) (16 - 18)  SpO2: 99% (01 Aug 2018 11:08) (95% - 99%)

## 2018-08-01 NOTE — PROGRESS NOTE ADULT - ASSESSMENT
HPI:  See H and P for full details.  See in  ED noted to be in respiratory distress on Bipap with metabolic acidosis.  Given 2 amps bicarb and bicarb drip for metabolic acidosis, lactate 12. Spoke to her at length, she does not doctor has not seen and MD in many years. She was AAand 0 x 4 knows who the president is in spite of her hepatic encephalopathy, Denies ETOH/drugs/Hx of HIV or liver disease. Noted to be in fulminant hepatic failure, t bili 10.7, INR 2.76, AST 84  . Denies Tylenol consumption or any toxic ingestion. Post cardioversion for Afib with RVR earlier today, reportedly lost a pulse. In ICU, made decision to intubate . premedicated with 4 mg mversed, 100 mcg fent, started on propofol at 40. Intubated by myself on first attempt with glidescope 7.5 ETT to lip 21, placed RIJ TLC on first attempt. Bedside echo with RV dilation, severe TR, septal bowing, LV EF appears normal to slighlty decreased, I got LVOT VTI of 10 possibly indicating a component of LV failure that to me seems secondary to a primary RV proscess, possibly portopulmonary HTN. Basically my question is: is this a chronic liver picture causing portopulmonary HTN and RV failure or is this a primary RV failure causing hepatic congestion. Will check formal echo, fractionate the bilis, send HIV, hepatitis and autoimmune work up. Will treat the fulminant hepatic failure with N-acetylcycteine protocol which has good evidence behind it for all forms of fulminant hepatic failure, f/u a tylenol level, add lactulose for the hyperammonemia,Aztrenaom flagyl for intrabdominal proscess in setting of PCN allergey with unknown reaction, check urine electrolytes to R/O hepatorenal syndrome, levophed to keep MAP > 65 mmhg, Check formal echo, trend cardiac enzymes. GI, renal, cards consults. C/W bicarb drip for now is making some urine. Non contrast CT H/C/A/P to evaluate for sources of sepsis.  Prognosis is guarded. She told me she has a daughter named Osvaldo in [place] who she wishes to be her HCP, thou sounds like she may be sestranged from this daughter.  CCM time initial 46 min plus another 1 hour, total 1 hr and 46 minuted included recieving patient from ER, preparation for intubation  intubation, central line placement bedside echo.  ----------------------- as Above --------------------------------------------------------------------------------------------------  Np history obtainable besides above. Intubated unresponsive. Notes reviewed. See Ct scan / sonogram. Bilirubin shows improvement over a short period of time  ---------------- Elevated Bili > transaminases/alk phos  - Seconadry to acute on chronic liver disease , VS side effects of medications. 1) supportive care  2) f/u LFTs 3) work up for underlying liver disease

## 2018-08-01 NOTE — BEHAVIORAL HEALTH ASSESSMENT NOTE - RISK ASSESSMENT
Risk factors; H/o anger issues, acute and chronic medical issues, some paranoia present  Protective factors; Denies SI and HI, future oriented

## 2018-08-01 NOTE — BEHAVIORAL HEALTH ASSESSMENT NOTE - OTHER
as per pt. lives with friend guarded, irritable spoke to the writer after encouragement not assessed some mild paranoia noted

## 2018-08-01 NOTE — PROGRESS NOTE ADULT - PROBLEM SELECTOR PLAN 1
Bili 2.5   [id number]- relatively unchanged.  No labs today.  No signs of hemochromatosis. MAZIN  1 : 320 butiliruben improving without any direct treatment.  - Supportive care for now ( follow up labs ordered ). check CT scan ( f/u pylonephrotis )

## 2018-08-01 NOTE — BEHAVIORAL HEALTH ASSESSMENT NOTE - NSBHCONSULTFOLLOWAFTERCARE_PSY_A_CORE FT
[facility]. walk in clinic : [phone] (9AM-7PM)  [facility] Outpatient clinic: [phone]

## 2018-08-01 NOTE — PROGRESS NOTE ADULT - SUBJECTIVE AND OBJECTIVE BOX
Patient is a 54y old  Female who presents with a chief complaint of Sepsis/septic shock, ?HRS, CRS, Liver failure, severe HAGMA (19 Jul 2018 19:54)      PAST MEDICAL & SURGICAL HISTORY: Resting in bed, not in any acute distress       INTERVAL HISTORY:  	  MEDICATIONS:  MEDICATIONS  (STANDING):  ALPRAZolam 0.25 milliGRAM(s) Oral at bedtime  chlorhexidine 4% Liquid 1 Application(s) Topical <User Schedule>  fluconAZOLE IVPB 200 milliGRAM(s) IV Intermittent every 24 hours  fluconAZOLE IVPB      furosemide   Injectable 20 milliGRAM(s) IV Push daily  heparin  Injectable 5000 Unit(s) SubCutaneous every 8 hours  meropenem  IVPB 1000 milliGRAM(s) IV Intermittent every 8 hours  meropenem  IVPB      vancomycin  IVPB      vancomycin  IVPB 750 milliGRAM(s) IV Intermittent every 12 hours    MEDICATIONS  (PRN):      Vitals:  T(F): 98 (08-01-18 @ 11:08), Max: 98 (08-01-18 @ 11:08)  HR: 78 (08-01-18 @ 11:08) (69 - 78)  BP: 146/93 (08-01-18 @ 11:08) (137/68 - 156/91)  RR: 18 (08-01-18 @ 11:08) (16 - 18)  SpO2: 99% (08-01-18 @ 11:08) (95% - 99%)  Wt(kg): --86.1 kg    07-31 @ 07:01  -  08-01 @ 07:00  --------------------------------------------------------  IN:    Solution: 500 mL    Solution: 100 mL    Solution: 150 mL  Total IN: 750 mL    OUT:    Indwelling Catheter - Urethral: 2000 mL  Total OUT: 2000 mL    Total NET: -1250 mL    PHYSICAL EXAM:  Neuro: Awake, responsive  CV: S1 S2 RRR  Lungs: CTABL  GI: Soft, BS +, ND, NT  Extremities: No edema    	    RADIOLOGY: < from: Xray Chest 1 View AP/PA. (07.31.18 @ 08:01) >  Endotracheal and gastric tubes have been removed. There is no change in   position and appearance of the left internal jugular central venous   catheter.    There are small pleural effusions and mild pulmonary vascular congestion.   The cardiac and mediastinal contours are prominent, which may be due to   magnification from AP technique and shallow inspiration. The osseous   structures are intact.    IMPRESSION:    Small pleural effusions and pulmonary vascular congestion.    < end of copied text >      DIAGNOSTIC TESTING:    [x ] Echocardiogram: < from: TTE Echo Doppler w/o Cont (07.19.18 @ 19:57) >   1. Left ventricular ejection fraction, by visual estimation, is 30 to   35%.   2. There is no left ventricular hypertrophy.   3. Biatrial enlargement.   4. Right ventricular dilatation.   5. Moderate mitral valve regurgitation.   6. Mild tricuspid regurgitation.   7. Pulmonic valve regurgitation.   8. Moderately to severely decreased global left ventricular systolic   function.    < end of copied text >      LABS:	 	      31 Jul 2018 04:25    142    |  101    |  16     ----------------------------<  127    3.4     |  32     |  0.54   30 Jul 2018 02:35    144    |  104    |  17     ----------------------------<  100    3.4     |  33     |  0.52     Ca    8.5        31 Jul 2018 04:25  Phos  3.2       31 Jul 2018 04:25  Mg     1.9       31 Jul 2018 04:25    TPro  6.8    /  Alb  1.9    /  TBili  2.5    /  DBili  x      /  AST  37     /  ALT  25     /  AlkPhos  76     31 Jul 2018 04:25                          9.0    6.24  )-----------( 170      ( 31 Jul 2018 04:25 )             29.5 ,                       8.4    7.73  )-----------( 150      ( 30 Jul 2018 02:35 )             27.2 Patient is a 54y old  Female who presents with a chief complaint of Sepsis/septic shock, ?HRS, CRS, Liver failure, severe HAGMA (19 Jul 2018 19:54)      PAST MEDICAL & SURGICAL HISTORY  no known medical history    INTERVAL HISTORY: Resting in bed, not in nay acute distress   	  MEDICATIONS:  MEDICATIONS  (STANDING):  ALPRAZolam 0.25 milliGRAM(s) Oral at bedtime  chlorhexidine 4% Liquid 1 Application(s) Topical <User Schedule>  fluconAZOLE IVPB 200 milliGRAM(s) IV Intermittent every 24 hours  fluconAZOLE IVPB      furosemide   Injectable 20 milliGRAM(s) IV Push daily  heparin  Injectable 5000 Unit(s) SubCutaneous every 8 hours  meropenem  IVPB 1000 milliGRAM(s) IV Intermittent every 8 hours  meropenem  IVPB      vancomycin  IVPB      vancomycin  IVPB 750 milliGRAM(s) IV Intermittent every 12 hours    MEDICATIONS  (PRN):      Vitals:  T(F): 98 (08-01-18 @ 11:08), Max: 98 (08-01-18 @ 11:08)  HR: 78 (08-01-18 @ 11:08) (69 - 78)  BP: 146/93 (08-01-18 @ 11:08) (137/68 - 156/91)  RR: 18 (08-01-18 @ 11:08) (16 - 18)  SpO2: 99% (08-01-18 @ 11:08) (95% - 99%)  Wt(kg): --86.1 kg    07-31 @ 07:01  -  08-01 @ 07:00  --------------------------------------------------------  IN:    Solution: 500 mL    Solution: 100 mL    Solution: 150 mL  Total IN: 750 mL    OUT:    Indwelling Catheter - Urethral: 2000 mL  Total OUT: 2000 mL    Total NET: -1250 mL    PHYSICAL EXAM:  Neuro: Awake, responsive  CV: S1 S2 RRR, + systolic murmur   Lungs: diminished to bases   GI: hard to touch 2/2 anasarca, BS +  Extremities: +++ edema, + anasarca     	    RADIOLOGY: < from: Xray Chest 1 View AP/PA. (07.31.18 @ 08:01) >  Endotracheal and gastric tubes have been removed. There is no change in   position and appearance of the left internal jugular central venous   catheter.    There are small pleural effusions and mild pulmonary vascular congestion.   The cardiac and mediastinal contours are prominent, which may be due to   magnification from AP technique and shallow inspiration. The osseous   structures are intact.    IMPRESSION:    Small pleural effusions and pulmonary vascular congestion.    < end of copied text >      DIAGNOSTIC TESTING:    [x ] Echocardiogram: < from: TTE Echo Doppler w/o Cont (07.19.18 @ 19:57) >   1. Left ventricular ejection fraction, by visual estimation, is 30 to   35%.   2. There is no left ventricular hypertrophy.   3. Biatrial enlargement.   4. Right ventricular dilatation.   5. Moderate mitral valve regurgitation.   6. Mild tricuspid regurgitation.   7. Pulmonic valve regurgitation.   8. Moderately to severely decreased global left ventricular systolic   function.    < end of copied text >      LABS:	 	      31 Jul 2018 04:25    142    |  101    |  16     ----------------------------<  127    3.4     |  32     |  0.54   30 Jul 2018 02:35    144    |  104    |  17     ----------------------------<  100    3.4     |  33     |  0.52     Ca    8.5        31 Jul 2018 04:25  Phos  3.2       31 Jul 2018 04:25  Mg     1.9       31 Jul 2018 04:25    TPro  6.8    /  Alb  1.9    /  TBili  2.5    /  DBili  x      /  AST  37     /  ALT  25     /  AlkPhos  76     31 Jul 2018 04:25                          9.0    6.24  )-----------( 170      ( 31 Jul 2018 04:25 )             29.5 ,                       8.4    7.73  )-----------( 150      ( 30 Jul 2018 02:35 )             27.2

## 2018-08-01 NOTE — PROGRESS NOTE ADULT - ASSESSMENT
55yo lady who presented to the ER in respiratory distress / metabolic acidosis / hepatic encephalopathy / hepatic failure.  

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
## 2018-08-01 NOTE — PROGRESS NOTE ADULT - ATTENDING COMMENTS
Cat Scan:  no gas in the collecting system. Left renal pelvic stone present. Will remove Lyons catheter tomorrow

## 2018-08-01 NOTE — PROGRESS NOTE ADULT - SUBJECTIVE AND OBJECTIVE BOX
INTERVAL HPI/OVERNIGHT EVENTS:    Patient lying comfortably.  No complaint of abdominal pain.  Tolerating diet with no complaint of nausea/vomiting.  Lyons intact with no hematuria.     Vital Signs Last 24 Hrs  T(C): 36.1 (01 Aug 2018 05:21), Max: 36.6 (31 Jul 2018 12:00)  T(F): 97 (01 Aug 2018 05:21), Max: 97.8 (31 Jul 2018 12:00)  HR: 76 (01 Aug 2018 05:21) (69 - 111)  BP: 156/91 (01 Aug 2018 05:21) (128/74 - 159/102)  BP(mean): --  RR: 16 (01 Aug 2018 05:21) (16 - 22)  SpO2: 95% (01 Aug 2018 05:21) (95% - 98%)    MEDICATIONS  (STANDING):  ALPRAZolam 0.25 milliGRAM(s) Oral at bedtime  chlorhexidine 4% Liquid 1 Application(s) Topical <User Schedule>  fluconAZOLE IVPB 200 milliGRAM(s) IV Intermittent every 24 hours  fluconAZOLE IVPB      furosemide   Injectable 20 milliGRAM(s) IV Push daily  heparin  Injectable 5000 Unit(s) SubCutaneous every 8 hours  lidocaine 1% (Preservative-free) Injectable 20 milliLiter(s) Local Injection once  meropenem  IVPB 1000 milliGRAM(s) IV Intermittent every 8 hours  meropenem  IVPB      vancomycin  IVPB      vancomycin  IVPB 750 milliGRAM(s) IV Intermittent every 12 hours    MEDICATIONS  (PRN):      PHYSICAL EXAM:    General: NAD, alert and awake  HEENT: NCAT, EOMI, conjunctiva clear  Chest: nonlabored respirations, good inspiratory effort  Abdomen: soft, NTND.   Extremities: no pedal edema or calf tenderness noted   : no sp or CVA tenderness, Lyons in situ with clear yellow urine 2000ml/24hrs    I&O's Detail    31 Jul 2018 07:01  -  01 Aug 2018 07:00  --------------------------------------------------------  IN:    Solution: 500 mL    Solution: 100 mL    Solution: 150 mL  Total IN: 750 mL    OUT:    Indwelling Catheter - Urethral: 2000 mL  Total OUT: 2000 mL    Total NET: -1250 mL          LABS:                        9.0    6.24  )-----------( 170      ( 31 Jul 2018 04:25 )             29.5     07-31    142  |  101  |  16  ----------------------------<  127<H>  3.4<L>   |  32<H>  |  0.54    Ca    8.5      31 Jul 2018 04:25  Phos  3.2     07-31  Mg     1.9     07-31    TPro  6.8  /  Alb  1.9<L>  /  TBili  2.5<H>  /  DBili  x   /  AST  37  /  ALT  25  /  AlkPhos  76  07-31      CT Abdomen/Pelvis ----  Pending.        Impression:    54F a/w septic shock, SHARYN, respiratory failure,  due to left emphysematous pyelonephritis, found with staghorn calculous of left kidney on CT, urine culture 7/19 with + e coli, klebsiella, CNS and enterococcus faecalis. Now extubated  Plan:  follow up repeat  CT to evaluate emphysematous pyelonephritis   - Continue antibiotics per ID  - Lyons catheter, monitor urine output, monitor renal function   continue medical management/supportive care   prophylactic measure   will discuss with Dr. Frances

## 2018-08-01 NOTE — BEHAVIORAL HEALTH ASSESSMENT NOTE - HPI (INCLUDE ILLNESS QUALITY, SEVERITY, DURATION, TIMING, CONTEXT, MODIFYING FACTORS, ASSOCIATED SIGNS AND SYMPTOMS)
Patient is a 53 yo woman with no known PMH of ground Hepatomegaly, nonspecific gallbladder wall thickening. Left nephrolithiasis. Pt. initially admitted to the ICU for sepsis,  intubated on mechanical ventilation, now s/p extubation and transferred to floor. Ct abdomen showed Staghorn calculus in the left kidney, pt. needs repeat CT abdomen to r/o pyelonephritis. Psychiatry consulted as pt. is refusing CT abdomen.    Patient seen and evaluated, AAOX3, overall calm and linear but at times appears very upset and irritable. Patient stated that due to severe infections she was admitted to ICU first. Patient also knows that she has been on IV antibiotics. Patient stated that she knows that she need CT abdomen to see if she has any infection and stated that she is willing to go for CT abdomen, however pt. stated that the tube was placed in her rectum due to diarrhea and she want that tube to be removed first. Patient denies SI and HI, denies AH and VH. Some paranoia noted, pt. stated that " no one is treating me well due to my color". Reassurance was provided.   Patient stated that she saw a therapist 10 years ago for anger issues, denies past psychiatric inpatient admission, denies SA in the past. patient stated that she has 10 children but does not want team to get in touch with them. She stated that  " I don't want to bother my kids"     Discussed with Dr. Street, pt. has urinary catheter and  does not have any rectal tube. Primary team will talk to the pt. to reassure her. Patient is a 55 yo woman with  PMH of ground Hepatomegaly, nonspecific gallbladder wall thickening. Left nephrolithiasis. Pt. initially admitted to the ICU for sepsis,  intubated on mechanical ventilation, now s/p extubation and transferred to floor. Ct abdomen showed Staghorn calculus in the left kidney, pt. needs repeat CT abdomen to r/o pyelonephritis. Psychiatry consulted as pt. is refusing CT abdomen.    Patient seen and evaluated, AAOX3, overall calm and linear but at times appears very upset and irritable. Patient stated that due to severe infections she was admitted to ICU first. Patient also knows that she has been on IV antibiotics. Patient stated that she knows that she need CT abdomen to see if she has any infection and stated that she is willing to go for CT abdomen, however pt. stated that the tube was placed in her rectum due to diarrhea and she want that tube to be removed first. Patient denies SI and HI, denies AH and VH. Some paranoia noted, pt. stated that " no one is treating me well due to my color". Reassurance was provided.   Patient stated that she saw a therapist 10 years ago for anger issues, denies past psychiatric inpatient admission, denies SA in the past. patient stated that she has 10 children but does not want team to get in touch with them. She stated that  " I don't want to bother my kids"     Discussed with Dr. Street, pt. has urinary catheter and  does not have any rectal tube. Primary team will talk to the pt. to reassure her.

## 2018-08-01 NOTE — PROGRESS NOTE ADULT - SUBJECTIVE AND OBJECTIVE BOX
Patient is a 54y old  Female who presents with a chief complaint of Sepsis/septic shock, ?HRS, CRS, Liver failure, severe HAGMA (19 Jul 2018 19:54)      INTERVAL HPI/OVERNIGHT EVENTS: no acute events. pt was refusing blood draws this morning     MEDICATIONS  (STANDING):  chlorhexidine 4% Liquid 1 Application(s) Topical <User Schedule>  fluconAZOLE IVPB 200 milliGRAM(s) IV Intermittent every 24 hours  fluconAZOLE IVPB      furosemide   Injectable 20 milliGRAM(s) IV Push daily  heparin  Injectable 5000 Unit(s) SubCutaneous every 8 hours  meropenem  IVPB 1000 milliGRAM(s) IV Intermittent every 8 hours  meropenem  IVPB      vancomycin  IVPB      vancomycin  IVPB 750 milliGRAM(s) IV Intermittent every 12 hours    MEDICATIONS  (PRN):      Allergies    penicillin (Unknown)    Intolerances        REVIEW OF SYSTEMS:  CONSTITUTIONAL: No fever, weight loss, or fatigue  EYES: No eye pain, visual disturbances, or discharge  ENMT:  No difficulty hearing, tinnitus, vertigo; No sinus or throat pain  NECK: No pain or stiffness  RESPIRATORY: No cough, wheezing, chills or hemoptysis; No shortness of breath  CARDIOVASCULAR: No chest pain, palpitations, dizziness, or leg swelling  GASTROINTESTINAL: No abdominal or epigastric pain. No nausea, vomiting, or hematemesis; No diarrhea or constipation. No melena or hematochezia.  GENITOURINARY: No dysuria, frequency, hematuria, or incontinence  NEUROLOGICAL: No headaches, memory loss, loss of strength, numbness, or tremors  SKIN: No itching, burning, rashes, or lesions   LYMPH NODES: No enlarged glands  ENDOCRINE: No heat or cold intolerance; No hair loss  MUSCULOSKELETAL: No joint pain or swelling; No muscle, back, or extremity pain  PSYCHIATRIC: No depression, anxiety, mood swings, or difficulty sleeping  HEME/LYMPH: No easy bruising, or bleeding gums  ALLERGY AND IMMUNOLOGIC: No hives or eczema    Vital Signs Last 24 Hrs  T(C): 36.4 (01 Aug 2018 16:51), Max: 36.7 (01 Aug 2018 11:08)  T(F): 97.6 (01 Aug 2018 16:51), Max: 98 (01 Aug 2018 11:08)  HR: 80 (01 Aug 2018 16:51) (69 - 160)  BP: 173/94 (01 Aug 2018 16:51) (137/68 - 176/101)  BP(mean): --  RR: 20 (01 Aug 2018 16:51) (16 - 20)  SpO2: 96% (01 Aug 2018 16:51) (95% - 99%)    PHYSICAL EXAM:  GENERAL: NAD,   HEAD:  Atraumatic, Normocephalic  EYES: EOMI, PERRLA, conjunctiva and sclera clear  ENMT: No tonsillar erythema, exudates, or enlargement; Moist mucous membranes, poor dentition, No lesions  NECK: Supple, No JVD, Normal thyroid  NERVOUS SYSTEM:  Alert & Oriented X3, Good concentration; Motor Strength 5/5 B/L upper and lower extremities; DTRs 2+ intact and symmetric  CHEST/LUNG: Clear to percussion bilaterally; No rales, rhonchi, wheezing, or rubs  HEART: Regular rate and rhythm; No murmurs, rubs, or gallops  ABDOMEN: Soft, Nontender, Nondistended; Bowel sounds present. abdominal edema. no fluid wave   EXTREMITIES:  2+ Peripheral Pulses, No clubbing, cyanosis,. 2+ LE edema to the thigh   LYMPH: No lymphadenopathy noted  SKIN: No rashes or lesions    LABS:                        9.0    6.24  )-----------( 170      ( 31 Jul 2018 04:25 )             29.5     07-31    142  |  101  |  16  ----------------------------<  127<H>  3.4<L>   |  32<H>  |  0.54    Ca    8.5      31 Jul 2018 04:25  Phos  3.2     07-31  Mg     1.9     07-31    TPro  6.8  /  Alb  1.9<L>  /  TBili  2.5<H>  /  DBili  x   /  AST  37  /  ALT  25  /  AlkPhos  76  07-31        CAPILLARY BLOOD GLUCOSE          RADIOLOGY & ADDITIONAL TESTS:    < from: TTE Echo Doppler w/o Cont (07.19.18 @ 19:57) >  Summary:   1. Left ventricular ejection fraction, by visual estimation, is 30 to   35%.   2. There is no left ventricular hypertrophy.   3. Biatrial enlargement.   4. Right ventricular dilatation.   5. Moderate mitral valve regurgitation.   6. Mild tricuspid regurgitation.   7. Pulmonic valve regurgitation.   8. Moderately to severely decreased global left ventricular systolic   function.    < end of copied text >      Imaging Personally Reviewed:  [x ] YES  [ ] NO    Consultant(s) Notes Reviewed:  [x ] YES  [ ] NO    Care Discussed with Consultants/Other Providers [ x] YES  [ ] NO

## 2018-08-01 NOTE — PROGRESS NOTE ADULT - ASSESSMENT
53 yo F arrived with septic shock, with acute kidney injury acute respiratory failure, with persistent lactic acidosis in the setting of emphysematous pyelonephritis. Also found to have liver failure with ascites and anasarca likely sec to congestive heart failure? EF 30-35%.        1. NEURO  - encephalopathy resolved  - following commands but appears paranoid.   - psych consult appreciated     2. PULM  - has decreased breath sounds at the bases, but is in no distress. xray shows pulm congestion   - will cont to diurese pt for pulmonary edema, volume overload    3. CV  - EF 30-35 % with mod MV reg and mild RV dilatation  - newly diagnosed paroxysmal afibb and was cardioverted in the ED. will likely need full dose ac.   - transfer to tele bed  -repeat echo   - increase lasix d/t persistent edema/asasarca/chf   - will cont to monitor HR  - unclear if pt with hx of CHF vs cardiomyopathy due to underlying sepsis      4. GI  - hyperbilirubinemia improving as well as LFTs  - cause of liver function abnormalities can be due to congestive hepatopathy vs underlying septic shock  - pt on admission did receive NAC treatment in setting of liver failure and unknown hx, U tox negative, hepatitis panel negative  - pt was on unknown herbal meds at home     5. RENAL  - ARF with ATN due to septic shock  - metabolic acidosis resolved  - nova draining urine, good urine output with diuresis  - urology follow up for staghorn calculi and emphysematous pyelonephritis. will  repeat CT abdomen today     6. ID  - resolved septic shock due to emphysematous pyelonephritis  - u cx growing klebsiella, enterococcus, and e-coli  - cont with vanco and meropenem and fluconazole   - pt had been on fluconazole since admission 7/19, d/jovita s/p 1 week of fungal coverage   - ID follow up      8. GEN  - no family has been at bedside, reportedly has a daughter out of state possibly in [place]?  - will need social work to locate family and SSN to see if she has insurance

## 2018-08-02 LAB
ALBUMIN SERPL ELPH-MCNC: 2.2 G/DL — LOW (ref 3.3–5)
ALP SERPL-CCNC: 94 U/L — SIGNIFICANT CHANGE UP (ref 40–120)
ALT FLD-CCNC: 39 U/L — SIGNIFICANT CHANGE UP (ref 12–78)
ANION GAP SERPL CALC-SCNC: 7 MMOL/L — SIGNIFICANT CHANGE UP (ref 5–17)
AST SERPL-CCNC: 59 U/L — HIGH (ref 15–37)
BASE EXCESS BLDA CALC-SCNC: 11.3 MMOL/L — HIGH (ref -2–2)
BILIRUB SERPL-MCNC: 2.4 MG/DL — HIGH (ref 0.2–1.2)
BLOOD GAS COMMENTS: SIGNIFICANT CHANGE UP
BLOOD GAS COMMENTS: SIGNIFICANT CHANGE UP
BLOOD GAS SOURCE: SIGNIFICANT CHANGE UP
BUN SERPL-MCNC: 23 MG/DL — SIGNIFICANT CHANGE UP (ref 7–23)
CALCIUM SERPL-MCNC: 8.4 MG/DL — LOW (ref 8.5–10.1)
CHLORIDE SERPL-SCNC: 96 MMOL/L — SIGNIFICANT CHANGE UP (ref 96–108)
CK MB BLD-MCNC: 4.4 % — HIGH (ref 0–3.5)
CK MB CFR SERPL CALC: 1.5 NG/ML — SIGNIFICANT CHANGE UP (ref 0.5–3.6)
CK SERPL-CCNC: 34 U/L — SIGNIFICANT CHANGE UP (ref 26–192)
CO2 SERPL-SCNC: 38 MMOL/L — HIGH (ref 22–31)
CREAT SERPL-MCNC: 0.73 MG/DL — SIGNIFICANT CHANGE UP (ref 0.5–1.3)
GLUCOSE BLDC GLUCOMTR-MCNC: 250 MG/DL — HIGH (ref 70–99)
GLUCOSE BLDC GLUCOMTR-MCNC: 87 MG/DL — SIGNIFICANT CHANGE UP (ref 70–99)
GLUCOSE SERPL-MCNC: 54 MG/DL — LOW (ref 70–99)
GRAM STN FLD: SIGNIFICANT CHANGE UP
HCO3 BLDA-SCNC: 37 MMOL/L — HIGH (ref 21–29)
HCT VFR BLD CALC: 31.9 % — LOW (ref 34.5–45)
HGB BLD-MCNC: 10.2 G/DL — LOW (ref 11.5–15.5)
HOROWITZ INDEX BLDA+IHG-RTO: 100 — SIGNIFICANT CHANGE UP
LACTATE SERPL-SCNC: 2.1 MMOL/L — HIGH (ref 0.7–2)
LEGIONELLA AG UR QL: NEGATIVE — SIGNIFICANT CHANGE UP
MAGNESIUM SERPL-MCNC: 1.9 MG/DL — SIGNIFICANT CHANGE UP (ref 1.6–2.6)
MCHC RBC-ENTMCNC: 29.7 PG — SIGNIFICANT CHANGE UP (ref 27–34)
MCHC RBC-ENTMCNC: 32 GM/DL — SIGNIFICANT CHANGE UP (ref 32–36)
MCV RBC AUTO: 93 FL — SIGNIFICANT CHANGE UP (ref 80–100)
NRBC # BLD: 0 /100 WBCS — SIGNIFICANT CHANGE UP (ref 0–0)
NT-PROBNP SERPL-SCNC: 6975 PG/ML — HIGH (ref 0–125)
PCO2 BLDA: 55 MMHG — HIGH (ref 32–46)
PH BLD: 7.44 — SIGNIFICANT CHANGE UP (ref 7.35–7.45)
PHOSPHATE SERPL-MCNC: 3 MG/DL — SIGNIFICANT CHANGE UP (ref 2.5–4.5)
PLATELET # BLD AUTO: 252 K/UL — SIGNIFICANT CHANGE UP (ref 150–400)
PO2 BLDA: 110 MMHG — HIGH (ref 74–108)
POTASSIUM SERPL-MCNC: 2.1 MMOL/L — CRITICAL LOW (ref 3.5–5.3)
POTASSIUM SERPL-SCNC: 2.1 MMOL/L — CRITICAL LOW (ref 3.5–5.3)
PROT SERPL-MCNC: 7.4 GM/DL — SIGNIFICANT CHANGE UP (ref 6–8.3)
RBC # BLD: 3.43 M/UL — LOW (ref 3.8–5.2)
RBC # FLD: 21.8 % — HIGH (ref 10.3–14.5)
SAO2 % BLDA: 98 % — HIGH (ref 92–96)
SODIUM SERPL-SCNC: 141 MMOL/L — SIGNIFICANT CHANGE UP (ref 135–145)
SPECIMEN SOURCE: SIGNIFICANT CHANGE UP
TROPONIN I SERPL-MCNC: 0.04 NG/ML — SIGNIFICANT CHANGE UP (ref 0.01–0.04)
WBC # BLD: 16.73 K/UL — HIGH (ref 3.8–10.5)
WBC # FLD AUTO: 16.73 K/UL — HIGH (ref 3.8–10.5)

## 2018-08-02 PROCEDURE — 93010 ELECTROCARDIOGRAM REPORT: CPT

## 2018-08-02 PROCEDURE — 99233 SBSQ HOSP IP/OBS HIGH 50: CPT

## 2018-08-02 PROCEDURE — 71045 X-RAY EXAM CHEST 1 VIEW: CPT | Mod: 26,76

## 2018-08-02 RX ORDER — POTASSIUM CHLORIDE 20 MEQ
40 PACKET (EA) ORAL ONCE
Qty: 0 | Refills: 0 | Status: COMPLETED | OUTPATIENT
Start: 2018-08-02 | End: 2018-08-02

## 2018-08-02 RX ORDER — MIDAZOLAM HYDROCHLORIDE 1 MG/ML
4 INJECTION, SOLUTION INTRAMUSCULAR; INTRAVENOUS EVERY 4 HOURS
Qty: 0 | Refills: 0 | Status: DISCONTINUED | OUTPATIENT
Start: 2018-08-02 | End: 2018-08-02

## 2018-08-02 RX ORDER — PROPOFOL 10 MG/ML
10 INJECTION, EMULSION INTRAVENOUS
Qty: 1000 | Refills: 0 | Status: DISCONTINUED | OUTPATIENT
Start: 2018-08-02 | End: 2018-08-04

## 2018-08-02 RX ORDER — CHLORHEXIDINE GLUCONATE 213 G/1000ML
1 SOLUTION TOPICAL
Qty: 0 | Refills: 0 | Status: DISCONTINUED | OUTPATIENT
Start: 2018-08-02 | End: 2018-08-09

## 2018-08-02 RX ORDER — NOREPINEPHRINE BITARTRATE/D5W 8 MG/250ML
0.05 PLASTIC BAG, INJECTION (ML) INTRAVENOUS
Qty: 8 | Refills: 0 | Status: DISCONTINUED | OUTPATIENT
Start: 2018-08-02 | End: 2018-08-03

## 2018-08-02 RX ORDER — FUROSEMIDE 40 MG
80 TABLET ORAL ONCE
Qty: 0 | Refills: 0 | Status: DISCONTINUED | OUTPATIENT
Start: 2018-08-02 | End: 2018-08-02

## 2018-08-02 RX ORDER — FUROSEMIDE 40 MG
40 TABLET ORAL ONCE
Qty: 0 | Refills: 0 | Status: DISCONTINUED | OUTPATIENT
Start: 2018-08-02 | End: 2018-08-02

## 2018-08-02 RX ORDER — MIDODRINE HYDROCHLORIDE 2.5 MG/1
10 TABLET ORAL EVERY 8 HOURS
Qty: 0 | Refills: 0 | Status: DISCONTINUED | OUTPATIENT
Start: 2018-08-02 | End: 2018-08-04

## 2018-08-02 RX ORDER — MIDAZOLAM HYDROCHLORIDE 1 MG/ML
2 INJECTION, SOLUTION INTRAMUSCULAR; INTRAVENOUS EVERY 4 HOURS
Qty: 0 | Refills: 0 | Status: DISCONTINUED | OUTPATIENT
Start: 2018-08-02 | End: 2018-08-08

## 2018-08-02 RX ORDER — FENTANYL CITRATE 50 UG/ML
50 INJECTION INTRAVENOUS ONCE
Qty: 0 | Refills: 0 | Status: COMPLETED | OUTPATIENT
Start: 2018-08-02 | End: 2018-08-02

## 2018-08-02 RX ORDER — MIDAZOLAM HYDROCHLORIDE 1 MG/ML
2 INJECTION, SOLUTION INTRAMUSCULAR; INTRAVENOUS ONCE
Qty: 0 | Refills: 0 | Status: COMPLETED | OUTPATIENT
Start: 2018-08-02 | End: 2018-08-02

## 2018-08-02 RX ORDER — POTASSIUM CHLORIDE 20 MEQ
10 PACKET (EA) ORAL
Qty: 0 | Refills: 0 | Status: COMPLETED | OUTPATIENT
Start: 2018-08-02 | End: 2018-08-02

## 2018-08-02 RX ORDER — FENTANYL CITRATE 50 UG/ML
100 INJECTION INTRAVENOUS
Qty: 0 | Refills: 0 | Status: DISCONTINUED | OUTPATIENT
Start: 2018-08-02 | End: 2018-08-08

## 2018-08-02 RX ORDER — CHLORHEXIDINE GLUCONATE 213 G/1000ML
15 SOLUTION TOPICAL
Qty: 0 | Refills: 0 | Status: DISCONTINUED | OUTPATIENT
Start: 2018-08-02 | End: 2018-08-08

## 2018-08-02 RX ORDER — FUROSEMIDE 40 MG
20 TABLET ORAL
Qty: 0 | Refills: 0 | Status: DISCONTINUED | OUTPATIENT
Start: 2018-08-02 | End: 2018-08-07

## 2018-08-02 RX ORDER — AZITHROMYCIN 500 MG/1
500 TABLET, FILM COATED ORAL ONCE
Qty: 0 | Refills: 0 | Status: COMPLETED | OUTPATIENT
Start: 2018-08-02 | End: 2018-08-02

## 2018-08-02 RX ORDER — AZITHROMYCIN 500 MG/1
TABLET, FILM COATED ORAL
Qty: 0 | Refills: 0 | Status: DISCONTINUED | OUTPATIENT
Start: 2018-08-02 | End: 2018-08-03

## 2018-08-02 RX ORDER — FENTANYL CITRATE 50 UG/ML
0.5 INJECTION INTRAVENOUS
Qty: 2500 | Refills: 0 | Status: DISCONTINUED | OUTPATIENT
Start: 2018-08-02 | End: 2018-08-02

## 2018-08-02 RX ORDER — AZITHROMYCIN 500 MG/1
500 TABLET, FILM COATED ORAL EVERY 24 HOURS
Qty: 0 | Refills: 0 | Status: DISCONTINUED | OUTPATIENT
Start: 2018-08-03 | End: 2018-08-03

## 2018-08-02 RX ORDER — MIDAZOLAM HYDROCHLORIDE 1 MG/ML
2 INJECTION, SOLUTION INTRAMUSCULAR; INTRAVENOUS ONCE
Qty: 0 | Refills: 0 | Status: DISCONTINUED | OUTPATIENT
Start: 2018-08-02 | End: 2018-08-02

## 2018-08-02 RX ADMIN — MEROPENEM 100 MILLIGRAM(S): 1 INJECTION INTRAVENOUS at 06:01

## 2018-08-02 RX ADMIN — MIDAZOLAM HYDROCHLORIDE 2 MILLIGRAM(S): 1 INJECTION, SOLUTION INTRAMUSCULAR; INTRAVENOUS at 15:03

## 2018-08-02 RX ADMIN — LOSARTAN POTASSIUM 25 MILLIGRAM(S): 100 TABLET, FILM COATED ORAL at 06:03

## 2018-08-02 RX ADMIN — HEPARIN SODIUM 5000 UNIT(S): 5000 INJECTION INTRAVENOUS; SUBCUTANEOUS at 21:17

## 2018-08-02 RX ADMIN — Medication 100 MILLIEQUIVALENT(S): at 13:09

## 2018-08-02 RX ADMIN — Medication 100 MILLIEQUIVALENT(S): at 11:26

## 2018-08-02 RX ADMIN — Medication 250 MILLIGRAM(S): at 06:30

## 2018-08-02 RX ADMIN — MIDODRINE HYDROCHLORIDE 10 MILLIGRAM(S): 2.5 TABLET ORAL at 16:36

## 2018-08-02 RX ADMIN — CHLORHEXIDINE GLUCONATE 1 APPLICATION(S): 213 SOLUTION TOPICAL at 06:03

## 2018-08-02 RX ADMIN — MEROPENEM 100 MILLIGRAM(S): 1 INJECTION INTRAVENOUS at 15:27

## 2018-08-02 RX ADMIN — Medication 40 MILLIEQUIVALENT(S): at 11:26

## 2018-08-02 RX ADMIN — FENTANYL CITRATE 100 MICROGRAM(S): 50 INJECTION INTRAVENOUS at 10:00

## 2018-08-02 RX ADMIN — FENTANYL CITRATE 100 MICROGRAM(S): 50 INJECTION INTRAVENOUS at 16:57

## 2018-08-02 RX ADMIN — Medication 20 MILLIGRAM(S): at 17:03

## 2018-08-02 RX ADMIN — MEROPENEM 100 MILLIGRAM(S): 1 INJECTION INTRAVENOUS at 21:17

## 2018-08-02 RX ADMIN — Medication 40 MILLIGRAM(S): at 06:08

## 2018-08-02 RX ADMIN — HEPARIN SODIUM 5000 UNIT(S): 5000 INJECTION INTRAVENOUS; SUBCUTANEOUS at 15:02

## 2018-08-02 RX ADMIN — FENTANYL CITRATE 100 MICROGRAM(S): 50 INJECTION INTRAVENOUS at 17:25

## 2018-08-02 RX ADMIN — MIDAZOLAM HYDROCHLORIDE 2 MILLIGRAM(S): 1 INJECTION, SOLUTION INTRAMUSCULAR; INTRAVENOUS at 17:40

## 2018-08-02 RX ADMIN — MIDODRINE HYDROCHLORIDE 10 MILLIGRAM(S): 2.5 TABLET ORAL at 21:17

## 2018-08-02 RX ADMIN — Medication 100 MILLIEQUIVALENT(S): at 12:21

## 2018-08-02 RX ADMIN — Medication 250 MILLIGRAM(S): at 17:03

## 2018-08-02 RX ADMIN — FENTANYL CITRATE 100 MICROGRAM(S): 50 INJECTION INTRAVENOUS at 09:07

## 2018-08-02 RX ADMIN — Medication 81 MILLIGRAM(S): at 12:21

## 2018-08-02 RX ADMIN — AZITHROMYCIN 255 MILLIGRAM(S): 500 TABLET, FILM COATED ORAL at 10:11

## 2018-08-02 RX ADMIN — FLUCONAZOLE 100 MILLIGRAM(S): 150 TABLET ORAL at 20:08

## 2018-08-02 RX ADMIN — CHLORHEXIDINE GLUCONATE 1 APPLICATION(S): 213 SOLUTION TOPICAL at 09:00

## 2018-08-02 RX ADMIN — CHLORHEXIDINE GLUCONATE 15 MILLILITER(S): 213 SOLUTION TOPICAL at 17:03
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
## 2018-08-02 NOTE — PROGRESS NOTE ADULT - SUBJECTIVE AND OBJECTIVE BOX
TMAX - 98.5    On day # 15 Meropenem / # 15 Vanco / # 12 Diflucan now    Vital Signs Last 24 Hrs  T(C): 36.9 (02 Aug 2018 09:02), Max: 36.9 (02 Aug 2018 09:02)  T(F): 98.5 (02 Aug 2018 09:02), Max: 98.5 (02 Aug 2018 09:02)  HR: 61 (02 Aug 2018 12:01) (55 - 160)  BP: 117/62 (02 Aug 2018 10:15) (88/40 - 176/101)  BP(mean): 77 (02 Aug 2018 10:15) (50 - 99)  RR: 20 (02 Aug 2018 10:15) (18 - 22)  SpO2: 100% (02 Aug 2018 12:01) (70% - 100%)  Mode: AC/ CMV (Assist Control/ Continuous Mandatory Ventilation)  RR (machine): 18  TV (machine): 450  FiO2: 35  PEEP: 5  ITime: 0.9  MAP: 11  PIP: 28  Supplemental O2:  on 35 % FIO2 + 5 PEEP on ventilator      Patient returned to ICU this AM after requiring intubation for Respiratory Distress again.  She had been transferred to  yesterday afternoon for onset of A Fib again but now in RSR.  Coughing with purulent-appearing secretions noted in the ET-tube.      PHYSICAL EXAM  General: sedated now, on ventilator but coughing intermittently, lying in bed in semi-inclined position, in NAD at present, remains edematous throughout  HEENT:  conj pink, sclerae anicteric, PERRLA, orally intubated and with NGT in place  Neck:  semi-supple, no nodes noted  Heart:  RR  Lungs:  diffuse moist rhonchi bilaterall  Abdomen:  BS+, soft, appears non-tender to palpation                   + abdominal wall edema  Extremities:  2-3+ edema LE's                     chronic skin changes both LE's                     LUE Midline in place - site clean and dressing dry and intact - placed 7/24                     arms and hands remain swollen  Skin:  warm, dry, no rash noted      I&O's Summary :    01 Aug 2018 07:01  -  02 Aug 2018 07:00  --------------------------------------------------------  IN: 0 mL / OUT: 1600 mL / NET: -1600 mL    02 Aug 2018 07:01  -  02 Aug 2018 12:58  --------------------------------------------------------  IN: 250 mL / OUT: 750 mL / NET: -500 mL        LABS:  CBC Full  -  ( 02 Aug 2018 10:30 )  WBC Count : 16.73 K/uL  Hemoglobin : 10.2 g/dL  Hematocrit : 31.9 %  Platelet Count - Automated : 252 K/uL  Mean Cell Volume : 93.0 fl  Mean Cell Hemoglobin : 29.7 pg  Mean Cell Hemoglobin Concentration : 32.0 gm/dL  Auto Neutrophil # : x  Auto Lymphocyte # : x  Auto Monocyte # : x  Auto Eosinophil # : x  Auto Basophil # : x  Auto Neutrophil % : x  Auto Lymphocyte % : x  Auto Monocyte % : x  Auto Eosinophil % : x  Auto Basophil % : x    08-02    141  |  96  |  23  ----------------------------<  54<L>  2.1<LL>   |  38<H>  |  0.73    Ca    8.4<L>      02 Aug 2018 10:30  Phos  3.0     08-02  Mg     1.9     08-02    TPro  7.4  /  Alb  2.2<L>  /  TBili  2.4<H>  /  DBili  x   /  AST  59<H>  /  ALT  39  /  AlkPhos  94  08-02    LIVER FUNCTIONS - ( 02 Aug 2018 10:30 )  Alb: 2.2 g/dL / Pro: 7.4 gm/dL / ALK PHOS: 94 U/L / ALT: 39 U/L / AST: 59 U/L / GGT: x             ABG - ( 02 Aug 2018 09:54 )  pH, Arterial: x     pH, Blood: 7.44  /  pCO2: 55    /  pO2: 110   / HCO3: 37    / Base Excess: 11.3  /  SaO2: 98        CARDIAC MARKERS ( 02 Aug 2018 10:30 )  .036 ng/mL / x     / 34 U/L / x     / 1.5 ng/mL      PBNP - 6,975    Lactate - 2.1 ( elevated )      Vancomycin Level, Trough: 16.1 ug/mL (07-31 @ 06:26)        Radiology:   CXR - < from: Xray Chest 1 View AP/PA. (08.02.18 @ 11:36) >  FINDINGS:   ET tube tip is 1.5cm above the nivia.  NG tube tip is in the proximal stomach.  There is stable cardiomegaly.  There is interval development of bilateral lung opacities - right lung   more than left - may be due to pulmonary edema but bilateral multifocal   pneumonia not excluded in the right clinical setting.  Small left pleural effusion has increased. No significant right pleural   effusion. No pneumothorax.  No acute bony abnormalities.    IMPRESSION:   Interval development of pulmonary edema or bilateral multifocal pneumonia   (with right-sided preponderance) since 8/1/18 study.  Interval increase in small left pleural effusion.  Life supporting devices in appropriate position.                             CT ABDOMEN + PELVIS -    < from: CT Abdomen and Pelvis No Cont (08.01.18 @ 16:12) >  INTERPRETATION:  CT of the abdomen and pelvis without IV or oral contrast    Clinical Indication: Follow-up pyelonephritis    Technique: Axial multidetector CT images of the abdomen and pelvis are   acquired without IV or oral contrast.    Comparison: 7/19/2018.    Findings:    Abdomen: Limited sections through the lung bases demonstrate mild   bilateral pleural effusions, stable on the left and increased on the   right. Combination of atelectasis and consolidation in the right middle   lobe and the left lower lobe. Mild right lower lobe atelectasis.    Examination of the abdomen and pelvis is again limited by lack of IV and   oral contrast. Allowing for the noncontrast technique, the liver,   pancreas, spleen, and the right kidney appear grossly unremarkable.   Nonspecific adrenal thickening is again noted bilaterally.    The gallbladder is contracted.    There is a stable staghorn calculus in the left kidney. Previously noted   air or gas in the left renal calyces is no longer seen. No   hydronephrosis. Evaluation for pyelonephritis is limited by lack of IV   contrast.    The appendix is are not visualized. No bowel obstruction, or grossly  thickened bowel wall.    No evidence for free air, ascites, or enlarged lymph node.    Pelvis: Lyons catheter in the urinary bladder which is collapsed.   Associated small air in the urinary bladder. The uterus appears grossly   unremarkable. The bowel structures are grossly intact. No enlarged pelvic   lymph node. Nonspecific presacral soft tissue edema is again noted.    Anasarca is again noted.    Impression: Mild bilateral pleural effusions.    Pulmonary consolidation and atelectasis in theright middle lobe and left   lower lobe.    Stable staghorn calculus in the left kidney. No hydronephrosis. The   previously noted gas or air in the left renal calyces is no longer seen.   Evaluation for pyelonephritis is difficult without IV contrast.    Other findings as above.    A preliminary report was provided by [facility].                    CXR - < from: Xray Chest 1 View- PORTABLE-Urgent (08.01.18 @ 17:42) >  INTERPRETATION:  AP semierect chest on August 1, 2018 at 12 noon. Patient   is being followed for positive lung findings.    Heart suggest enlargement. Left jugular line seen on July 31 has been   removed.    On the July 31 study there was a significant density in the left lower   lung field likely a combination of atelectasis and effusion. There was   also a small infiltrate at right base medially.    On present examination the basilar lung findings are improved but are   still present.    IMPRESSION: Improved basilar lung findings as above. Left jugular line   removed.                     ECHO - < from: TTE Limited Echo w/o Cont (08.01.18 @ 20:03) >  PHYSICIAN INTERPRETATION:  Left Ventricle: The left ventricular internal cavity size is normal.  Global LV systolic function was mildly to moderately decreased. Left   ventricular ejection fraction, by visual estimation, is 40 to 45%. The   left ventricular diastolic function could not be assessed in this study.  Findings are consistent with global cardiomyopathy.  Right Ventricle: The right ventricular size is mildly enlarged. RV   systolic function is mildly reduced.  Left Atrium: Mildly enlarged left atrium.  Right Atrium: Mildly enlarged right atrium.  Pericardium: A small pericardial effusion is present. The pericardial   effusion is posterior to the left ventricle. There is a moderate pleural   effusion in the left lateral region.  Mitral Valve: Structurally normal mitral valve, with normal leaflet   excursion. Moderate mitral valve regurgitation is seen.  Tricuspid Valve: The tricuspid valve is degenerative in appearance.   Moderate-severe tricuspid regurgitation is visualized.  Aortic Valve: The aortic valve is trileaflet. Sclerotic aortic valve with   normal opening. Trivial aortic valve regurgitation is seen.  Pulmonic Valve: The pulmonic valvewas not well visualized. Trace   pulmonic valve regurgitation.  Aorta: Aortic root measured at Sinus of Valsalva is normal.       Summary:   1. Left ventricular ejection fraction, by visual estimation, is 40 to   45%.   2. Technically limited study.   3. Mildly to moderately decreased global left ventricular systolic   function.   4. Global cardiomyopathy.   5. Normal left ventricular internal cavity size.   6. The left ventricular diastolic function could not be assessed in this   study.   7. Moderate pleural effusion in the left lateral region.   8. Small pericardial effusion.   9. Moderate mitral valve regurgitation.  10. Structurally normal mitral valve, with normal leaflet excursion.  11. Degenerative tricuspid valve.  12. Moderate-severe tricuspid regurgitation.  13. Sclerotic aortic valve with normal opening.  14. Trace pulmonic valve regurgitation.  15. Compared with the prior study from 7/19/18, global LVEF is somewhat   improved. RV appears mildly enlarged and moderate to severetricuspid   insufficiency and moderate mitral insufficiency noted.        Impression:   Remains afebrile on present ab rx with Meropenem + Vanco + Diflucan for rx of initial Sepsis with Shock and Respiratory Failure due to Lt Emphysematous Pyelonephritis with underlying Staghorn Calculus, and now with recurrent Respiratory failure with findings on CXR and CT Scan consistent with Multifocal PNA and recurrent leukocytosis with  sl;ightly elevated Lactate level and increased PBNP ( rx'ed with lasix ).   CT findings noted of disappearance of the previously reported air in the Lt Renal calyces and no evidence of hydronephrosis.    Suggestions:  Will therefore continue present ab rx and re-cx blood and sputum now.  Follow-up temps and labs closely.  Follow-up CXR.

## 2018-08-02 NOTE — PROGRESS NOTE ADULT - SUBJECTIVE AND OBJECTIVE BOX
Patient is a 54y old  Female who presents with a chief complaint of Sepsis/septic shock, ?HRS, CRS, Liver failure, severe HAGMA. Intubated again last night for confusional state and hypoxemia.      PAST MEDICAL & SURGICAL HISTORY  no known medical history    INTERVAL HISTORY: vented, sedated, not in nay acute distress   	  MEDICATIONS:  MEDICATIONS  (STANDING):  ALPRAZolam 0.25 milliGRAM(s) Oral at bedtime  chlorhexidine 4% Liquid 1 Application(s) Topical <User Schedule>  fluconAZOLE IVPB 200 milliGRAM(s) IV Intermittent every 24 hours  fluconAZOLE IVPB      furosemide   Injectable 20 milliGRAM(s) IV Push daily  heparin  Injectable 5000 Unit(s) SubCutaneous every 8 hours  meropenem  IVPB 1000 milliGRAM(s) IV Intermittent every 8 hours  meropenem  IVPB      vancomycin  IVPB      vancomycin  IVPB 750 milliGRAM(s) IV Intermittent every 12 hours    ICU Vital Signs Last 24 Hrs  T(C): 36.9 (02 Aug 2018 09:02), Max: 36.9 (02 Aug 2018 09:02)  T(F): 98.5 (02 Aug 2018 09:02), Max: 98.5 (02 Aug 2018 09:02)  HR: 65 (02 Aug 2018 13:00) (55 - 160)  BP: 102/79 (02 Aug 2018 13:00) (84/46 - 176/101)  BP(mean): 84 (02 Aug 2018 13:00) (50 - 114)  ABP: --  ABP(mean): --  RR: 17 (02 Aug 2018 13:00) (17 - 28)  SpO2: 100% (02 Aug 2018 12:01) (70% - 100%)    PHYSICAL EXAM:  Neuro: Awake, responsive  CV: S1 S2 RRR, + systolic murmur   Lungs: diminished to bases   GI: mildly distended,  BS +  Extremities: ++ edema, + anasarca , chronic stasis changes    	    RADIOLOGY:   < from: Xray Chest 1 View- PORTABLE-Urgent (08.02.18 @ 12:42) >    EXAM:  XR CHEST PORTABLE URGENT 1V                            PROCEDURE DATE:  08/02/2018          INTERPRETATION:    DATE OF STUDY: 8/2/18 at 12:14PM.    PRIOR: Earlier 8/2/18 chest.    CLINICAL INDICATION: Readjust ET and NG tubes.    TECHNIQUE: portable chest - done semierect.    FINDINGS:   ET tube tipi is 3.0cm above the nivia.  NG tube is in the stomach.  There is stable cardiomegaly.  Mild improvement in pulmonary edema and decrease in left pleural effusion.  No significant right pleural effusion.  No pneumothorax.  The bony structures are intact.    IMPRESSION:   Mild improvement in pulmonary edema.  Mild decrease in left pleural effusion.  Life supporting devices in appropriate position.                  ANN BLACK M.D., ATTENDING RADIOLOGIST  This document has been electronically signed. Aug  2 2018  1:06PM                < end of copied text >        DIAGNOSTIC TESTING:    [x ] Echocardiogram: < from: TTE Echo Doppler w/o Cont (07.19.18 @ 19:57) >   1. Left ventricular ejection fraction, by visual estimation, is 30 to   35%.   2. There is no left ventricular hypertrophy.   3. Biatrial enlargement.   4. Right ventricular dilatation.   5. Moderate mitral valve regurgitation.   6. Mild tricuspid regurgitation.   7. Pulmonic valve regurgitation.   8. Moderately to severely decreased global left ventricular systolic   function.    < end of copied text >      LABS:	 	    08-02    141  |  96  |  23  ----------------------------<  54<L>  2.1<LL>   |  38<H>  |  0.73    Ca    8.4<L>      02 Aug 2018 10:30  Phos  3.0     08-02  Mg     1.9     08-02    TPro  7.4  /  Alb  2.2<L>  /  TBili  2.4<H>  /  DBili  x   /  AST  59<H>  /  ALT  39  /  AlkPhos  94  08-02                          10.2   16.73 )-----------( 252      ( 02 Aug 2018 10:30 )             31.9     31 Jul 2018 04:25    142    |  101    |  16     ----------------------------<  127    3.4     |  32     |  0.54   30 Jul 2018 02:35    144    |  104    |  17     ----------------------------<  100    3.4     |  33     |  0.52     Ca    8.5        31 Jul 2018 04:25  Phos  3.2       31 Jul 2018 04:25  Mg     1.9       31 Jul 2018 04:25    TPro  6.8    /  Alb  1.9    /  TBili  2.5    /  DBili  x      /  AST  37     /  ALT  25     /  AlkPhos  76     31 Jul 2018 04:25                          9.0    6.24  )-----------( 170      ( 31 Jul 2018 04:25 )             29.5 ,                       8.4    7.73  )-----------( 150      ( 30 Jul 2018 02:35 )             27.2

## 2018-08-02 NOTE — PROGRESS NOTE ADULT - PROBLEM SELECTOR PLAN 1
Bili 2.5   [id number]- relatively unchanged.    No signs of hemochromatosis. MAZIN  1 : 320 butiliruben improving without any direct treatment.  - Supportive care for now ( follow up labs ordered ). check CT scan ( f/u pylonephrotis ) Bili 2.5   [id number]- relatively unchanged.    No signs of hemochromatosis. MAZIN  1 : 320 butiliruben improving without any direct treatment.  - Supportive care for now ( follow up labs ordered ). repeat CT scan essentially negative

## 2018-08-02 NOTE — CHART NOTE - NSCHARTNOTEFT_GEN_A_CORE
Assessment: Pt with recurrent respiratory failure reintubated 8/2. Pt presents with distended abdomen & noted creamy brown stool 8/2. Pt on IV Abx for multifocal pneumonia & & sepsis with shock.    Factors impacting intake: [ ] none [ ] nausea  [ ] vomiting [ ] diarrhea [ ] constipation  [ ]chewing problems [x] swallowing issues  [x ] other: Respiratory distress    Diet Prescription:  NPO (8/2)  Intake:     Current Weight:  Wt=85.4kg(8/2); +3 gen edema, Wt=89.5kg(7/22)  % Weight Change  4.1kg wt loss (5%) x 11 days    Unable to perform nutrition focus physical exam due to medical acuity; observed temples & clavicle mild wasting    Pertinent Medications: MEDICATIONS  (STANDING):  aspirin  chewable 81 milliGRAM(s) Oral daily  azithromycin  IVPB      carvedilol 6.25 milliGRAM(s) Oral every 12 hours  chlorhexidine 0.12% Liquid 15 milliLiter(s) Swish and Spit two times a day  chlorhexidine 4% Liquid 1 Application(s) Topical <User Schedule>  fluconAZOLE IVPB 200 milliGRAM(s) IV Intermittent every 24 hours  fluconAZOLE IVPB      furosemide   Injectable 40 milliGRAM(s) IV Push daily  furosemide   Injectable 20 milliGRAM(s) IV Push two times a day  heparin  Injectable 5000 Unit(s) SubCutaneous every 8 hours  losartan 25 milliGRAM(s) Oral daily  meropenem  IVPB 1000 milliGRAM(s) IV Intermittent every 8 hours  meropenem  IVPB      midazolam Injectable 2 milliGRAM(s) IV Push once  norepinephrine Infusion 0.05 MICROgram(s)/kG/Min (8.391 mL/Hr) IV Continuous <Continuous>  propofol Infusion 10 MICROgram(s)/kG/Min (5.37 mL/Hr) IV Continuous <Continuous>  vancomycin  IVPB      vancomycin  IVPB 750 milliGRAM(s) IV Intermittent every 12 hours    MEDICATIONS  (PRN):  fentaNYL    Injectable 100 MICROGram(s) IV Push every 1 hour PRN sedation  midazolam Injectable 4 milliGRAM(s) IV Push every 4 hours PRN sedation    Pertinent Labs: 08-02 Na141 mmol/L Glu 54 mg/dL<L> K+ 2.1 mmol/L<LL> Cr  0.73 mg/dL BUN 23 mg/dL 08-02 Phos 3.0 mg/dL 08-02 Alb 2.2 g/dL<L> 07-20 HvxdbtinfsD1A 5.0 %, WBC=16.73     CAPILLARY BLOOD GLUCOSE      POCT Blood Glucose.: 87 mg/dL (02 Aug 2018 11:32)  POCT Blood Glucose.: 250 mg/dL (02 Aug 2018 08:07)    Skin: sacrum    Estimated Needs:   [x ] no change since previous assessment 7/20/18  [ ] recalculated:     Previous Nutrition Diagnosis: [x] no nutrition dx  [ ] Inadequate Energy Intake [ ]Inadequate Oral Intake [ ] Excessive Energy Intake   [ ] Underweight [ ] Increased Nutrient Needs [ ] Overweight/Obesity   [ ] Altered GI Function [ ] Unintended Weight Loss [ ] Food & Nutrition Related Knowledge Deficit [ ] severe Malnutrition [ ] moderate malnutrition    Nutrition Diagnosis is [ ] ongoing  [ ] resolved  [ ] improved  [x ] not applicable   Previous Goal:     New Nutrition Diagnosis: [x ] Inadequate Energy Intake [ ]Inadequate Oral Intake [ ] Excessive Energy Intake   [ ] Underweight [ ] Increased Nutrient Needs [ ] Overweight/Obesity   [ ] Altered GI Function [ ] Unintended Weight Loss [ ] Food & Nutrition Related Knowledge Deficit [ ] Malnutrition     Related to: respiratory distress    As evidenced by:  NPO x 1 day;     Goal: pt to meet >75% energy & protein via TF      Interventions:   Recommend  [ ] Continue:  [ ] Change Diet To:  [ ] Nutrition Supplement:  [x ] Nutrition Support: Recommend NGT feeding Vital AF 1.2 @ 30ml/hr to goal rate 50ml/ba=2297rq, 1440kcal & 90gm protein  [ ] Other:     Monitoring and Evaluation:   [ ] PO intake [ x ] Tolerance to diet prescription [ x ] weights [ x ] labs[ x ] follow up per protocol  [ ] other:
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
## 2018-08-02 NOTE — PROGRESS NOTE ADULT - ASSESSMENT
HPI:  See H and P for full details.  See in  ED noted to be in respiratory distress on Bipap with metabolic acidosis.  Given 2 amps bicarb and bicarb drip for metabolic acidosis, lactate 12. Spoke to her at length, she does not doctor has not seen and MD in many years. She was AAand 0 x 4 knows who the president is in spite of her hepatic encephalopathy, Denies ETOH/drugs/Hx of HIV or liver disease. Noted to be in fulminant hepatic failure, t bili 10.7, INR 2.76, AST 84  . Denies Tylenol consumption or any toxic ingestion. Post cardioversion for Afib with RVR earlier today, reportedly lost a pulse. In ICU, made decision to intubate . premedicated with 4 mg mversed, 100 mcg fent, started on propofol at 40. Intubated by myself on first attempt with glidescope 7.5 ETT to lip 21, placed RIJ TLC on first attempt. Bedside echo with RV dilation, severe TR, septal bowing, LV EF appears normal to slighlty decreased, I got LVOT VTI of 10 possibly indicating a component of LV failure that to me seems secondary to a primary RV proscess, possibly portopulmonary HTN. Basically my question is: is this a chronic liver picture causing portopulmonary HTN and RV failure or is this a primary RV failure causing hepatic congestion. Will check formal echo, fractionate the bilis, send HIV, hepatitis and autoimmune work up. Will treat the fulminant hepatic failure with N-acetylcycteine protocol which has good evidence behind it for all forms of fulminant hepatic failure, f/u a tylenol level, add lactulose for the hyperammonemia,Aztrenaom flagyl for intrabdominal proscess in setting of PCN allergey with unknown reaction, check urine electrolytes to R/O hepatorenal syndrome, levophed to keep MAP > 65 mmhg, Check formal echo, trend cardiac enzymes. GI, renal, cards consults. C/W bicarb drip for now is making some urine. Non contrast CT H/C/A/P to evaluate for sources of sepsis.  Prognosis is guarded. She told me she has a daughter named Osvaldo in [place] who she wishes to be her HCP, thou sounds like she may be sestranged from this daughter.  CCM time initial 46 min plus another 1 hour, total 1 hr and 46 minuted included recieving patient from ER, preparation for intubation  intubation, central line placement bedside echo.  ----------------------- as Above --------------------------------------------------------------------------------------------------  Np history obtainable besides above. Intubated unresponsive. Notes reviewed. See Ct scan / sonogram. Bilirubin shows improvement over a short period of time  ---------------- Elevated Bili > transaminases/alk phos  - Seconadry to acute on chronic liver disease , VS side effects of medications. 1) supportive care  2) f/u LFTs 3) work up for underlying liver disease

## 2018-08-02 NOTE — AIRWAY PLACEMENT NOTE ADULT - POST AIRWAY PLACEMENT ASSESSMENT:
positive end tidal CO2 noted/chest excursion noted/breath sounds equal/skin color improved/breath sounds bilateral
CXR pending/chest excursion noted/breath sounds bilateral/breath sounds equal/skin color improved/positive end tidal CO2 noted

## 2018-08-02 NOTE — PROGRESS NOTE ADULT - SUBJECTIVE AND OBJECTIVE BOX
54y old  Female who presented with a chief complaint of Sepsis/septic shock, ?HRS, CRS, Liver failure, severe HAGMA and was admitted with ATRIAL FIBRILLATION WITH RVR, JAUNDICE, EDEMA, TACHYCARDIA    Interval History: RRT called for respiratory distress and hypoxia with O2 sats in the 60's. Patient was intubated by the ICU team and transferred to ICU 4. Overnight she was afebrile with normal vitals.     Vital Signs Last 24 Hrs  T(F): 96.4 (08-02-18 @ 07:53), Max: 98 (08-01-18 @ 11:08)  HR: 65 (08-02-18 @ 10:22)  BP: 157/88 (08-02-18 @ 07:53)  RR: 18 (08-02-18 @ 07:53)  SpO2: 100% (08-02-18 @ 10:22)  POCT Blood Glucose.: 250 mg/dL (02 Aug 2018 08:07)    PHYSICAL EXAM:  GENERAL: in acute distress  CHEST/LUNG: labored breathing, o2 sats on face mask was 72%.    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 Now re-intubated for respiratory failure.     - Continue antibiotics per ID (Azithro, Fluconazole, Merrem)  - Lyons was removed for TOV, but will likely be replaced in critical care setting. Monitor strict I/O's  - f/u labs  - DVT prophylaxis, Incentive Spirometer once extuabted, pain control  - continue current management per ICU team.  - Will discuss with surgical attending, Dr. Frances

## 2018-08-02 NOTE — PROGRESS NOTE ADULT - ASSESSMENT
55yo lady who presented to the ER in respiratory distress / metabolic acidosis / hepatic encephalopathy / hepatic failure.  Recurrent intubations after a ?PEA arrest s/p DCCV for Afib with RVR, requiring ICU management on vent with pressors, + septic shock due to emphysematous pyelonephritis with stag horn calculous in left kidney  Echo: LVEF 30%, RV dilatation , mod MR  + Anasarca     Plan  -Supportive care  -interval flash pulmonary edema and now elevated WBC's.  -remains quite edematous, more aggressive diuresis attempt prior to current extubation, monitor creat closely in setting of Rt side HF  -c/w abx as per ID, urology following   -LFTs now WNL, bilirubin remains elevated, GI following  -outlook overall poor.  -will repeat 2D echo to further eval EF

## 2018-08-03 LAB
ANION GAP SERPL CALC-SCNC: 8 MMOL/L — SIGNIFICANT CHANGE UP (ref 5–17)
ANION GAP SERPL CALC-SCNC: 9 MMOL/L — SIGNIFICANT CHANGE UP (ref 5–17)
BASOPHILS # BLD AUTO: 0.04 K/UL — SIGNIFICANT CHANGE UP (ref 0–0.2)
BASOPHILS NFR BLD AUTO: 0.3 % — SIGNIFICANT CHANGE UP (ref 0–2)
BUN SERPL-MCNC: 18 MG/DL — SIGNIFICANT CHANGE UP (ref 7–23)
BUN SERPL-MCNC: 20 MG/DL — SIGNIFICANT CHANGE UP (ref 7–23)
CALCIUM SERPL-MCNC: 8 MG/DL — LOW (ref 8.5–10.1)
CALCIUM SERPL-MCNC: 8.7 MG/DL — SIGNIFICANT CHANGE UP (ref 8.5–10.1)
CHLORIDE SERPL-SCNC: 95 MMOL/L — LOW (ref 96–108)
CHLORIDE SERPL-SCNC: 97 MMOL/L — SIGNIFICANT CHANGE UP (ref 96–108)
CO2 SERPL-SCNC: 37 MMOL/L — HIGH (ref 22–31)
CO2 SERPL-SCNC: 38 MMOL/L — HIGH (ref 22–31)
CREAT SERPL-MCNC: 0.53 MG/DL — SIGNIFICANT CHANGE UP (ref 0.5–1.3)
CREAT SERPL-MCNC: 0.64 MG/DL — SIGNIFICANT CHANGE UP (ref 0.5–1.3)
EOSINOPHIL # BLD AUTO: 0.17 K/UL — SIGNIFICANT CHANGE UP (ref 0–0.5)
EOSINOPHIL NFR BLD AUTO: 1.4 % — SIGNIFICANT CHANGE UP (ref 0–6)
GLUCOSE SERPL-MCNC: 75 MG/DL — SIGNIFICANT CHANGE UP (ref 70–99)
GLUCOSE SERPL-MCNC: 97 MG/DL — SIGNIFICANT CHANGE UP (ref 70–99)
HCT VFR BLD CALC: 26.2 % — LOW (ref 34.5–45)
HGB BLD-MCNC: 8.4 G/DL — LOW (ref 11.5–15.5)
IMM GRANULOCYTES NFR BLD AUTO: 0.4 % — SIGNIFICANT CHANGE UP (ref 0–1.5)
LYMPHOCYTES # BLD AUTO: 28.6 % — SIGNIFICANT CHANGE UP (ref 13–44)
LYMPHOCYTES # BLD AUTO: 3.37 K/UL — HIGH (ref 1–3.3)
MAGNESIUM SERPL-MCNC: 1.6 MG/DL — SIGNIFICANT CHANGE UP (ref 1.6–2.6)
MCHC RBC-ENTMCNC: 29.1 PG — SIGNIFICANT CHANGE UP (ref 27–34)
MCHC RBC-ENTMCNC: 32.1 GM/DL — SIGNIFICANT CHANGE UP (ref 32–36)
MCV RBC AUTO: 90.7 FL — SIGNIFICANT CHANGE UP (ref 80–100)
MONOCYTES # BLD AUTO: 0.56 K/UL — SIGNIFICANT CHANGE UP (ref 0–0.9)
MONOCYTES NFR BLD AUTO: 4.8 % — SIGNIFICANT CHANGE UP (ref 2–14)
NEUTROPHILS # BLD AUTO: 7.58 K/UL — HIGH (ref 1.8–7.4)
NEUTROPHILS NFR BLD AUTO: 64.5 % — SIGNIFICANT CHANGE UP (ref 43–77)
NRBC # BLD: 0 /100 WBCS — SIGNIFICANT CHANGE UP (ref 0–0)
PHOSPHATE SERPL-MCNC: 1.9 MG/DL — LOW (ref 2.5–4.5)
PLATELET # BLD AUTO: 216 K/UL — SIGNIFICANT CHANGE UP (ref 150–400)
POTASSIUM SERPL-MCNC: 2.5 MMOL/L — CRITICAL LOW (ref 3.5–5.3)
POTASSIUM SERPL-MCNC: 3.7 MMOL/L — SIGNIFICANT CHANGE UP (ref 3.5–5.3)
POTASSIUM SERPL-SCNC: 2.5 MMOL/L — CRITICAL LOW (ref 3.5–5.3)
POTASSIUM SERPL-SCNC: 3.7 MMOL/L — SIGNIFICANT CHANGE UP (ref 3.5–5.3)
RBC # BLD: 2.89 M/UL — LOW (ref 3.8–5.2)
RBC # FLD: 21.4 % — HIGH (ref 10.3–14.5)
SODIUM SERPL-SCNC: 141 MMOL/L — SIGNIFICANT CHANGE UP (ref 135–145)
SODIUM SERPL-SCNC: 143 MMOL/L — SIGNIFICANT CHANGE UP (ref 135–145)
WBC # BLD: 11.77 K/UL — HIGH (ref 3.8–10.5)
WBC # FLD AUTO: 11.77 K/UL — HIGH (ref 3.8–10.5)

## 2018-08-03 PROCEDURE — 99233 SBSQ HOSP IP/OBS HIGH 50: CPT

## 2018-08-03 PROCEDURE — 71045 X-RAY EXAM CHEST 1 VIEW: CPT | Mod: 26

## 2018-08-03 RX ORDER — POTASSIUM CHLORIDE 20 MEQ
10 PACKET (EA) ORAL
Qty: 0 | Refills: 0 | Status: COMPLETED | OUTPATIENT
Start: 2018-08-03 | End: 2018-08-03

## 2018-08-03 RX ORDER — SIMETHICONE 80 MG/1
80 TABLET, CHEWABLE ORAL DAILY
Qty: 0 | Refills: 0 | Status: DISCONTINUED | OUTPATIENT
Start: 2018-08-03 | End: 2018-08-09

## 2018-08-03 RX ORDER — POTASSIUM CHLORIDE 20 MEQ
40 PACKET (EA) ORAL EVERY 4 HOURS
Qty: 0 | Refills: 0 | Status: DISCONTINUED | OUTPATIENT
Start: 2018-08-03 | End: 2018-08-03

## 2018-08-03 RX ORDER — POTASSIUM CHLORIDE 20 MEQ
40 PACKET (EA) ORAL EVERY 4 HOURS
Qty: 0 | Refills: 0 | Status: COMPLETED | OUTPATIENT
Start: 2018-08-03 | End: 2018-08-03

## 2018-08-03 RX ORDER — FLUCONAZOLE 150 MG/1
200 TABLET ORAL DAILY
Qty: 0 | Refills: 0 | Status: DISCONTINUED | OUTPATIENT
Start: 2018-08-03 | End: 2018-08-14

## 2018-08-03 RX ORDER — POTASSIUM PHOSPHATE, MONOBASIC POTASSIUM PHOSPHATE, DIBASIC 236; 224 MG/ML; MG/ML
15 INJECTION, SOLUTION INTRAVENOUS ONCE
Qty: 0 | Refills: 0 | Status: COMPLETED | OUTPATIENT
Start: 2018-08-03 | End: 2018-08-03

## 2018-08-03 RX ADMIN — Medication 100 MILLIEQUIVALENT(S): at 08:29

## 2018-08-03 RX ADMIN — MIDODRINE HYDROCHLORIDE 10 MILLIGRAM(S): 2.5 TABLET ORAL at 06:10

## 2018-08-03 RX ADMIN — Medication 100 MILLIEQUIVALENT(S): at 12:59

## 2018-08-03 RX ADMIN — POTASSIUM PHOSPHATE, MONOBASIC POTASSIUM PHOSPHATE, DIBASIC 63.75 MILLIMOLE(S): 236; 224 INJECTION, SOLUTION INTRAVENOUS at 13:59

## 2018-08-03 RX ADMIN — CHLORHEXIDINE GLUCONATE 15 MILLILITER(S): 213 SOLUTION TOPICAL at 06:10

## 2018-08-03 RX ADMIN — CHLORHEXIDINE GLUCONATE 15 MILLILITER(S): 213 SOLUTION TOPICAL at 17:05

## 2018-08-03 RX ADMIN — FLUCONAZOLE 200 MILLIGRAM(S): 150 TABLET ORAL at 17:06

## 2018-08-03 RX ADMIN — Medication 40 MILLIEQUIVALENT(S): at 09:08

## 2018-08-03 RX ADMIN — HEPARIN SODIUM 5000 UNIT(S): 5000 INJECTION INTRAVENOUS; SUBCUTANEOUS at 22:28

## 2018-08-03 RX ADMIN — MEROPENEM 100 MILLIGRAM(S): 1 INJECTION INTRAVENOUS at 06:09

## 2018-08-03 RX ADMIN — Medication 100 MILLIEQUIVALENT(S): at 13:59

## 2018-08-03 RX ADMIN — HEPARIN SODIUM 5000 UNIT(S): 5000 INJECTION INTRAVENOUS; SUBCUTANEOUS at 06:10

## 2018-08-03 RX ADMIN — Medication 100 MILLIEQUIVALENT(S): at 07:18

## 2018-08-03 RX ADMIN — MIDODRINE HYDROCHLORIDE 10 MILLIGRAM(S): 2.5 TABLET ORAL at 13:04

## 2018-08-03 RX ADMIN — Medication 20 MILLIGRAM(S): at 06:10

## 2018-08-03 RX ADMIN — Medication 40 MILLIEQUIVALENT(S): at 13:03

## 2018-08-03 RX ADMIN — Medication 100 MILLIEQUIVALENT(S): at 11:56

## 2018-08-03 RX ADMIN — Medication 40 MILLIGRAM(S): at 06:10

## 2018-08-03 RX ADMIN — MIDODRINE HYDROCHLORIDE 10 MILLIGRAM(S): 2.5 TABLET ORAL at 22:28

## 2018-08-03 RX ADMIN — SIMETHICONE 80 MILLIGRAM(S): 80 TABLET, CHEWABLE ORAL at 22:28

## 2018-08-03 RX ADMIN — AZITHROMYCIN 255 MILLIGRAM(S): 500 TABLET, FILM COATED ORAL at 08:29

## 2018-08-03 RX ADMIN — MEROPENEM 100 MILLIGRAM(S): 1 INJECTION INTRAVENOUS at 22:28

## 2018-08-03 RX ADMIN — Medication 250 MILLIGRAM(S): at 17:06

## 2018-08-03 RX ADMIN — Medication 250 MILLIGRAM(S): at 06:09

## 2018-08-03 RX ADMIN — Medication 81 MILLIGRAM(S): at 12:02

## 2018-08-03 RX ADMIN — CHLORHEXIDINE GLUCONATE 1 APPLICATION(S): 213 SOLUTION TOPICAL at 12:02

## 2018-08-03 RX ADMIN — MEROPENEM 100 MILLIGRAM(S): 1 INJECTION INTRAVENOUS at 13:19

## 2018-08-03 RX ADMIN — HEPARIN SODIUM 5000 UNIT(S): 5000 INJECTION INTRAVENOUS; SUBCUTANEOUS at 13:04

## 2018-08-03 RX ADMIN — Medication 100 MILLIEQUIVALENT(S): at 06:09

## 2018-08-03 NOTE — PROGRESS NOTE ADULT - SUBJECTIVE AND OBJECTIVE BOX
UROLOGY PROGRESS HPI:  Pt seen and examined at bedside resting comfortably.     Vital Signs Last 24 Hrs  T(C): 36.9 (03 Aug 2018 03:12), Max: 37.1 (02 Aug 2018 15:24)  T(F): 98.5 (03 Aug 2018 03:12), Max: 98.8 (02 Aug 2018 15:24)  HR: 53 (03 Aug 2018 03:30) (52 - 103)  BP: 114/46 (03 Aug 2018 03:30) (84/46 - 194/52)  BP(mean): 65 (03 Aug 2018 03:30) (50 - 114)  RR: 18 (03 Aug 2018 03:30) (17 - 28)  SpO2: 100% (03 Aug 2018 03:30) (70% - 100%)      PHYSICAL EXAM:    GENERAL: NAD  CHEST/LUNG: Clear to ausculation, bilaterally   HEART: RRR S1S2  ABDOMEN: NTND  : nova indwelling with clear yellow urine. no suprapubic tenderness  EXTREMITIES:  calf soft, non tender b/l    I&O's Detail    01 Aug 2018 07:01  -  02 Aug 2018 07:00  --------------------------------------------------------  IN:  Total IN: 0 mL    OUT:    Indwelling Catheter - Urethral: 1600 mL  Total OUT: 1600 mL    Total NET: -1600 mL      02 Aug 2018 07:01  -  03 Aug 2018 04:18  --------------------------------------------------------  IN:    Solution: 250 mL    Solution: 300 mL    Solution: 50 mL    Solution: 250 mL    Solution: 100 mL    Vital HN: 340 mL  Total IN: 1290 mL    OUT:    Indwelling Catheter - Urethral: 3755 mL  Total OUT: 3755 mL    Total NET: -2465 mL          LABS:                        8.4    11.77 )-----------( 216      ( 03 Aug 2018 04:06 )             26.2     08-02    141  |  96  |  23  ----------------------------<  54<L>  2.1<LL>   |  38<H>  |  0.73    Ca    8.4<L>      02 Aug 2018 10:30  Phos  3.0     08-02  Mg     1.9     08-02    TPro  7.4  /  Alb  2.2<L>  /  TBili  2.4<H>  /  DBili  x   /  AST  59<H>  /  ALT  39  /  AlkPhos  94  08-02        Assessment: 54F a/w septic shock, SHARYN, respiratory failure, left emphysematous pyelonephritis with staghorn calculus of left kidney on CT, urine culture 7/19 with + Ecoli, klebsiella, CNS and enterococcus faecalis. Now re-intubated for respiratory failure.     Plan  - Continue antibiotics per ID   - Continue nova, monitor urine output  - eventually will need nephrostolithotomy   - DVT prophylaxis, Incentive Spirometer once extubated, pain control  - continue current management per ICU
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
## 2018-08-03 NOTE — PROGRESS NOTE ADULT - SUBJECTIVE AND OBJECTIVE BOX
Patient is a 54y old  Female who presents with a chief complaint of Sepsis/septic shock, ?HRS, CRS, Liver failure, severe HAGMA. Intubated again last night for confusional state and hypoxemia.      PAST MEDICAL & SURGICAL HISTORY  no known medical history    INTERVAL HISTORY: vented, sedated, no acute distress   	    ICU Vital Signs Last 24 Hrs  Vital Signs Last 24 Hrs  T(C): 37.5 (03 Aug 2018 08:00), Max: 37.5 (03 Aug 2018 08:00)  T(F): 99.5 (03 Aug 2018 08:00), Max: 99.5 (03 Aug 2018 08:00)  HR: 65 (03 Aug 2018 13:09) (52 - 78)  BP: 102/48 (03 Aug 2018 13:00) (82/52 - 159/80)  BP(mean): 61 (03 Aug 2018 13:00) (51 - 99)  RR: 19 (03 Aug 2018 13:00) (17 - 28)  SpO2: 99% (03 Aug 2018 13:09) (89% - 100%)    PHYSICAL EXAM:  Neuro: Awake, responsive  CV: S1 S2 RRR, + systolic murmur   Lungs: diminished to bases   GI: mildly distended,  BS +  Extremities: ++ edema, + anasarca , chronic stasis changes    	    RADIOLOGY:   < from: Xray Chest 1 View- PORTABLE-Urgent (08.02.18 @ 12:42) >    EXAM:  XR CHEST PORTABLE URGENT 1V                            PROCEDURE DATE:  08/02/2018          INTERPRETATION:    DATE OF STUDY: 8/2/18 at 12:14PM.    PRIOR: Earlier 8/2/18 chest.    CLINICAL INDICATION: Readjust ET and NG tubes.    TECHNIQUE: portable chest - done semierect.    FINDINGS:   ET tube tipi is 3.0cm above the nivia.  NG tube is in the stomach.  There is stable cardiomegaly.  Mild improvement in pulmonary edema and decrease in left pleural effusion.  No significant right pleural effusion.  No pneumothorax.  The bony structures are intact.    IMPRESSION:   Mild improvement in pulmonary edema.  Mild decrease in left pleural effusion.  Life supporting devices in appropriate position.                  ANN BLACK M.D., ATTENDING RADIOLOGIST  This document has been electronically signed. Aug  2 2018  1:06PM                < end of copied text >        DIAGNOSTIC TESTING:    [x ] Echocardiogram: < from: TTE Echo Doppler w/o Cont (07.19.18 @ 19:57) >   1. Left ventricular ejection fraction, by visual estimation, is 30 to   35%.   2. There is no left ventricular hypertrophy.   3. Biatrial enlargement.   4. Right ventricular dilatation.   5. Moderate mitral valve regurgitation.   6. Mild tricuspid regurgitation.   7. Pulmonic valve regurgitation.   8. Moderately to severely decreased global left ventricular systolic   function.    < end of copied text >      LABS:	 	                          8.4    11.77 )-----------( 216      ( 03 Aug 2018 04:06 )             26.2       08-03    143  |  97  |  20  ----------------------------<  75  2.5<LL>   |  37<H>  |  0.53    Ca    8.0<L>      03 Aug 2018 04:06  Phos  1.9     08-03  Mg     1.6     08-03    TPro  7.4  /  Alb  2.2<L>  /  TBili  2.4<H>  /  DBili  x   /  AST  59<H>  /  ALT  39  /  AlkPhos  94  08-02      < from: TTE Limited Echo w/o Cont (08.01.18 @ 20:03) >   1. Left ventricular ejection fraction, by visual estimation, is 40 to   45%.   2. Technically limited study.   3. Mildly to moderately decreased global left ventricular systolic   function.   4. Global cardiomyopathy.   5. Normal left ventricular internal cavity size.   6. The left ventricular diastolic function could not be assessed in this   study.   7. Moderate pleural effusion in the left lateral region.   8. Small pericardial effusion.   9. Moderate mitral valve regurgitation.  10. Structurally normal mitral valve, with normal leaflet excursion.  11. Degenerative tricuspid valve.  12. Moderate-severe tricuspid regurgitation.  13. Sclerotic aortic valve with normal opening.  14. Trace pulmonic valve regurgitation.  15. Compared with the prior study from 7/19/18, global LVEF is somewhat   improved. RV appears mildly enlarged and moderate to severetricuspid   insufficiency and moderate mitral insufficiency noted.    < end of copied text >

## 2018-08-03 NOTE — PROGRESS NOTE ADULT - ASSESSMENT
55 yo F arrived with septic shock, with acute kidney injury acute respiratory failure, with persistent lactic acidosis in the setting of emphysematous pyelonephritis. Also found to have liver failure with ascites and anasarca likely sec to congestive heart failure? EF 30-35%.  pt extubated x2 in the past. s/p RRT from floor with ac pulm edema , reintubated.    Neuro: awake and alert off sedation  CV: off levophed. BP stable, cont diuresis, pulm edema improving  Pulm: daily weaning trial, sedation vacation, pt may need trach due to repeated intubations sec to CHF and episodes of pulm edema.  GI: start tube feeds  Renal/Metabolic: Acute emphysematous pyelonephritis, with acute kidney injury now resolved; follow up urology.   ID: Emphysematous pyelo, with Ecoli , enterococcus and kleb in urine, cont on Vancomycin and Meropenem and fluconazole as per ID; followup ID   monitor and replete elctrolytes  Endo: No acute issues  DVT/GI ppx    Critical Care Time Spent 35 min.

## 2018-08-03 NOTE — PROGRESS NOTE ADULT - ASSESSMENT
55yo lady who presented to the ER in respiratory distress / metabolic acidosis / hepatic encephalopathy / hepatic failure.  Recurrent intubations after a ?PEA arrest s/p DCCV for Afib with RVR, requiring ICU management on vent with pressors, + septic shock due to emphysematous pyelonephritis with stag horn calculous in left kidney  Echo: LVEF 30%, RV dilatation , mod MR  + Anasarca     MEDICATIONS  (STANDING):  aspirin  chewable 81 milliGRAM(s) Oral daily  chlorhexidine 0.12% Liquid 15 milliLiter(s) Swish and Spit two times a day  chlorhexidine 4% Liquid 1 Application(s) Topical <User Schedule>  fluconAZOLE IVPB 200 milliGRAM(s) IV Intermittent every 24 hours   furosemide   Injectable 20 milliGRAM(s) IV Push two times a day  heparin  Injectable 5000 Unit(s) SubCutaneous every 8 hours  meropenem  IVPB 1000 milliGRAM(s) IV Intermittent every 8 hours    midodrine 10 milliGRAM(s) Oral every 8 hours  propofol Infusion 10 MICROgram(s)/kG/Min (5.37 mL/Hr) IV Continuous <Continuous>     vancomycin  IVPB 750 milliGRAM(s) IV Intermittent every 12 hours    Supportive care.    Rip Gaston MD, FACC

## 2018-08-03 NOTE — PROGRESS NOTE ADULT - SUBJECTIVE AND OBJECTIVE BOX
TMAX - 99.5    On day # 16 Meropenem / # 16 Vanco / # 13 Diflucan now    Vital Signs Last 24 Hrs  T(C): 37.3 (03 Aug 2018 13:30), Max: 37.5 (03 Aug 2018 08:00)  T(F): 99.2 (03 Aug 2018 13:30), Max: 99.5 (03 Aug 2018 08:00)  HR: 56 (03 Aug 2018 14:30) (52 - 77)  BP: 91/48 (03 Aug 2018 14:30) (82/52 - 159/80)  BP(mean): 59 (03 Aug 2018 14:30) (51 - 99)  RR: 18 (03 Aug 2018 14:30) (17 - 28)  SpO2: 100% (03 Aug 2018 14:30) (89% - 100%)  Mode: AC/ CMV (Assist Control/ Continuous Mandatory Ventilation)  RR (machine): 18  TV (machine): 450  FiO2: 30  PEEP: 5  ITime: 1  MAP: 10  PIP: 23  Supplemental O2:  on 30% FIO2 + 5 PEEP      Remains intubated on ventilator, but with decreased FIO2 to 30% now.  Patient is alert and cooperative today, c/o RUQ abdominal pain today, but improved with pain medication,      PHYSICAL EXAM  General:  awake, alert, on ventilator, appears much more comfortable today, in NAD and following commands  HEENT: conj pink, sclerae anicteric, PERRLA, orally intubated and with NGT in place with feedings in progress   Neck:  semi-supple, no nodes noted            Rt EJ IV in place - site clean  Heart:  RR  Lungs:  bilateral moist rhonchi anteriorly, no wheezing   Abdomen:  BS+, semi-soft, + abdominal wall edema, mild tenderness to palpation in the RUQ, but no rebound noted  Extremities: 2+ edema LE's                   arms and hands remain swollen                   LUE with Midline in place - site clean, dressing dry and intact - placed 7/24/18    Skin:  warm, dry, no rash noted        I&O's Summary :    02 Aug 2018 07:01  -  03 Aug 2018 07:00  --------------------------------------------------------  IN: 1960 mL / OUT: 4555 mL / NET: -2595 mL    03 Aug 2018 07:01  -  03 Aug 2018 15:15  --------------------------------------------------------  IN: 1350 mL / OUT: 2400 mL / NET: -1050 mL        LABS:  CBC Full  -  ( 03 Aug 2018 04:06 )  WBC Count : 11.77 K/uL  Hemoglobin : 8.4 g/dL  Hematocrit : 26.2 %  Platelet Count - Automated : 216 K/uL  Mean Cell Volume : 90.7 fl  Mean Cell Hemoglobin : 29.1 pg  Mean Cell Hemoglobin Concentration : 32.1 gm/dL  Auto Neutrophil # : 7.58 K/uL  Auto Lymphocyte # : 3.37 K/uL  Auto Monocyte # : 0.56 K/uL  Auto Eosinophil # : 0.17 K/uL  Auto Basophil # : 0.04 K/uL  Auto Neutrophil % : 64.5 %  Auto Lymphocyte % : 28.6 %  Auto Monocyte % : 4.8 %  Auto Eosinophil % : 1.4 %  Auto Basophil % : 0.3 %    08-03    143  |  97  |  20  ----------------------------<  75  2.5<LL>   |  37<H>  |  0.53    Ca    8.0<L>      03 Aug 2018 04:06  Phos  1.9     08-03  Mg     1.6     08-03    TPro  7.4  /  Alb  2.2<L>  /  TBili  2.4<H>  /  DBili  x   /  AST  59<H>  /  ALT  39  /  AlkPhos  94  08-02    LIVER FUNCTIONS - ( 02 Aug 2018 10:30 )  Alb: 2.2 g/dL / Pro: 7.4 gm/dL / ALK PHOS: 94 U/L / ALT: 39 U/L / AST: 59 U/L / GGT: x             ABG - ( 02 Aug 2018 09:54 )  pH, Arterial: x     pH, Blood: 7.44  /  pCO2: 55    /  pO2: 110   / HCO3: 37    / Base Excess: 11.3  /  SaO2: 98          CARDIAC MARKERS ( 02 Aug 2018 10:30 )  .036 ng/mL / x     / 34 U/L / x     / 1.5 ng/mL          MICROBIOLOGY:  Specimen Source: .Sputum Sputum trap (08-02 @ 12:43)  Culture Results:   No growth to date. (08-02 @ 12:43)  Culture - Sputum . (08.02.18 @ 12:43)    Gram Stain:   Rare polymorphonuclear leukocytes per low power field  Rare Squamous epithelial cells per low power field  No organisms seen per oil power field          Legionella Antigen, Urine: Negative (08-02 @ 12:40)        Radiology:  < from: Xray Chest 1 View AP/PA. (08.03.18 @ 07:05) >  COMPARISON: 8/2/2018    FINDINGS:     Endotracheal and gastric tubes are noted.    There are mildly improved bilateral lung opacities. The cardiac and   mediastinal contours are prominent, which may be due to magnification   from AP technique and shallow inspiration. The osseous structures are   intact.    IMPRESSION:    Mildly improved bilateral lung opacities.        Impression:   Slowly improving clinically again now on Meropenem + Vanco + Diflucan for rx of initial Sepsis with Shock secondary to Lt Emphysematous Pyelonephritis with underlying Staghorn Calculus, and now with recurrent Respiratory failure with Multifocal PNA, s/p recurrent episode of A Fib, now in RSR again.  Temps remain decreased and WBC's trending downwards again.      Suggestions:  Will continue present ab rx and follow-up temps, labs, and new Cx's of blood and sputum.  Will change Diflucan to po ( via NGT) now as patient is tolerating tube feedings.  Follow-up CXR.  Discussed with patient at bedside.

## 2018-08-04 LAB
ANION GAP SERPL CALC-SCNC: 8 MMOL/L — SIGNIFICANT CHANGE UP (ref 5–17)
BUN SERPL-MCNC: 18 MG/DL — SIGNIFICANT CHANGE UP (ref 7–23)
CALCIUM SERPL-MCNC: 8.7 MG/DL — SIGNIFICANT CHANGE UP (ref 8.5–10.1)
CHLORIDE SERPL-SCNC: 96 MMOL/L — SIGNIFICANT CHANGE UP (ref 96–108)
CO2 SERPL-SCNC: 37 MMOL/L — HIGH (ref 22–31)
CREAT SERPL-MCNC: 0.55 MG/DL — SIGNIFICANT CHANGE UP (ref 0.5–1.3)
CULTURE RESULTS: NO GROWTH — SIGNIFICANT CHANGE UP
GLUCOSE SERPL-MCNC: 79 MG/DL — SIGNIFICANT CHANGE UP (ref 70–99)
GRAM STN FLD: SIGNIFICANT CHANGE UP
HCT VFR BLD CALC: 26.3 % — LOW (ref 34.5–45)
HGB BLD-MCNC: 8.4 G/DL — LOW (ref 11.5–15.5)
MAGNESIUM SERPL-MCNC: 1.6 MG/DL — SIGNIFICANT CHANGE UP (ref 1.6–2.6)
MCHC RBC-ENTMCNC: 29 PG — SIGNIFICANT CHANGE UP (ref 27–34)
MCHC RBC-ENTMCNC: 31.9 GM/DL — LOW (ref 32–36)
MCV RBC AUTO: 90.7 FL — SIGNIFICANT CHANGE UP (ref 80–100)
NRBC # BLD: 0 /100 WBCS — SIGNIFICANT CHANGE UP (ref 0–0)
PHOSPHATE SERPL-MCNC: 2.6 MG/DL — SIGNIFICANT CHANGE UP (ref 2.5–4.5)
PLATELET # BLD AUTO: 210 K/UL — SIGNIFICANT CHANGE UP (ref 150–400)
POTASSIUM SERPL-MCNC: 3 MMOL/L — LOW (ref 3.5–5.3)
POTASSIUM SERPL-SCNC: 3 MMOL/L — LOW (ref 3.5–5.3)
RBC # BLD: 2.9 M/UL — LOW (ref 3.8–5.2)
RBC # FLD: 21.2 % — HIGH (ref 10.3–14.5)
SODIUM SERPL-SCNC: 141 MMOL/L — SIGNIFICANT CHANGE UP (ref 135–145)
SPECIMEN SOURCE: SIGNIFICANT CHANGE UP
WBC # BLD: 9.4 K/UL — SIGNIFICANT CHANGE UP (ref 3.8–10.5)
WBC # FLD AUTO: 9.4 K/UL — SIGNIFICANT CHANGE UP (ref 3.8–10.5)

## 2018-08-04 PROCEDURE — 99233 SBSQ HOSP IP/OBS HIGH 50: CPT

## 2018-08-04 RX ORDER — MIDODRINE HYDROCHLORIDE 2.5 MG/1
20 TABLET ORAL EVERY 8 HOURS
Qty: 0 | Refills: 0 | Status: DISCONTINUED | OUTPATIENT
Start: 2018-08-04 | End: 2018-08-11

## 2018-08-04 RX ORDER — POTASSIUM CHLORIDE 20 MEQ
40 PACKET (EA) ORAL ONCE
Qty: 0 | Refills: 0 | Status: COMPLETED | OUTPATIENT
Start: 2018-08-04 | End: 2018-08-04

## 2018-08-04 RX ADMIN — Medication 20 MILLIGRAM(S): at 05:35

## 2018-08-04 RX ADMIN — Medication 81 MILLIGRAM(S): at 11:55

## 2018-08-04 RX ADMIN — CHLORHEXIDINE GLUCONATE 15 MILLILITER(S): 213 SOLUTION TOPICAL at 05:35

## 2018-08-04 RX ADMIN — CHLORHEXIDINE GLUCONATE 1 APPLICATION(S): 213 SOLUTION TOPICAL at 11:56

## 2018-08-04 RX ADMIN — MIDODRINE HYDROCHLORIDE 10 MILLIGRAM(S): 2.5 TABLET ORAL at 14:31

## 2018-08-04 RX ADMIN — HEPARIN SODIUM 5000 UNIT(S): 5000 INJECTION INTRAVENOUS; SUBCUTANEOUS at 14:31

## 2018-08-04 RX ADMIN — MEROPENEM 100 MILLIGRAM(S): 1 INJECTION INTRAVENOUS at 05:36

## 2018-08-04 RX ADMIN — FLUCONAZOLE 200 MILLIGRAM(S): 150 TABLET ORAL at 11:55

## 2018-08-04 RX ADMIN — HEPARIN SODIUM 5000 UNIT(S): 5000 INJECTION INTRAVENOUS; SUBCUTANEOUS at 23:00

## 2018-08-04 RX ADMIN — MEROPENEM 100 MILLIGRAM(S): 1 INJECTION INTRAVENOUS at 14:31

## 2018-08-04 RX ADMIN — Medication 250 MILLIGRAM(S): at 18:15

## 2018-08-04 RX ADMIN — CHLORHEXIDINE GLUCONATE 15 MILLILITER(S): 213 SOLUTION TOPICAL at 18:40

## 2018-08-04 RX ADMIN — Medication 250 MILLIGRAM(S): at 05:35

## 2018-08-04 RX ADMIN — MEROPENEM 100 MILLIGRAM(S): 1 INJECTION INTRAVENOUS at 23:00

## 2018-08-04 RX ADMIN — MIDODRINE HYDROCHLORIDE 20 MILLIGRAM(S): 2.5 TABLET ORAL at 23:00

## 2018-08-04 RX ADMIN — Medication 40 MILLIEQUIVALENT(S): at 06:27

## 2018-08-04 RX ADMIN — MIDODRINE HYDROCHLORIDE 10 MILLIGRAM(S): 2.5 TABLET ORAL at 05:35

## 2018-08-04 RX ADMIN — SIMETHICONE 80 MILLIGRAM(S): 80 TABLET, CHEWABLE ORAL at 12:00

## 2018-08-04 NOTE — PROGRESS NOTE ADULT - ASSESSMENT
53 yo F arrived with septic shock, with acute kidney injury acute respiratory failure, with persistent lactic acidosis in the setting of emphysematous pyelonephritis. Also found to have liver failure with ascites and anasarca likely sec to congestive heart failure? EF 30-35%.  pt extubated x2 in the past. s/p RRT from floor with ac pulm edema , reintubated.    Neuro: awake and alert off sedation  CV: CHF with mod MR and TR, BP low normal, started on midodrine,, diuresis prn, pulm edema resolving  Pulm: daily weaning trial, sedation vacation, pt may need trach due to repeated intubations sec to CHF and episodes of pulm edema.  GI: cont tube feeds  Renal/Metabolic: Acute emphysematous pyelonephritis, with acute kidney injury now resolved; follow up urology.   ID: Emphysematous pyelo, with Ecoli , enterococcus and kleb in urine, CXR RLL infiltrate resolving pulm edema vs pna? cont on Vancomycin and Meropenem and fluconazole as per ID; followup ID   monitor and replete electrolytes  Endo: No acute issues  DVT/GI ppx    Critical Care Time Spent 35 min.

## 2018-08-04 NOTE — PROGRESS NOTE ADULT - SUBJECTIVE AND OBJECTIVE BOX
Patient is a 54y old  Female who presents with a chief complaint of Sepsis/septic shock, ?HRS, CRS, Liver failure, severe HAGMA. Intubated again last night for confusional state and hypoxemia.      PAST MEDICAL & SURGICAL HISTORY  no known medical history    INTERVAL HISTORY: vented, sedated  	    ICU Vital Signs Last 24 Hrs  Vital Signs Last 24 Hrs  T(C): 37.6 (04 Aug 2018 16:29), Max: 37.6 (03 Aug 2018 20:04)  T(F): 99.6 (04 Aug 2018 16:29), Max: 99.7 (03 Aug 2018 20:04)  HR: 56 (04 Aug 2018 18:00) (51 - 76)  BP: 81/54 (04 Aug 2018 18:00) (81/54 - 123/79)  BP(mean): 60 (04 Aug 2018 18:00) (53 - 88)  RR: 18 (04 Aug 2018 18:00) (14 - 29)  SpO2: 98% (04 Aug 2018 18:00) (85% - 100%)    PHYSICAL EXAM:  Neuro: Opens eyes and is somewhat responsive.  CV: S1 S2 RRR, + systolic murmur   Lungs: diminished to bases   GI: mildly distended,  BS +  Extremities: + edema, + anasarca , chronic stasis changes    	    RADIOLOGY:   < from: Xray Chest 1 View- PORTABLE-Urgent (08.02.18 @ 12:42) >    EXAM:  XR CHEST PORTABLE URGENT 1V                            PROCEDURE DATE:  08/02/2018          INTERPRETATION:    DATE OF STUDY: 8/2/18 at 12:14PM.    PRIOR: Earlier 8/2/18 chest.    CLINICAL INDICATION: Readjust ET and NG tubes.    TECHNIQUE: portable chest - done semierect.    FINDINGS:   ET tube tipi is 3.0cm above the nivia.  NG tube is in the stomach.  There is stable cardiomegaly.  Mild improvement in pulmonary edema and decrease in left pleural effusion.  No significant right pleural effusion.  No pneumothorax.  The bony structures are intact.    IMPRESSION:   Mild improvement in pulmonary edema.  Mild decrease in left pleural effusion.  Life supporting devices in appropriate position.      < end of copied text >      < from: TTE Limited Echo w/o Cont (08.01.18 @ 20:03) >   1. Left ventricular ejection fraction, by visual estimation, is 40 to   45%.   2. Technically limited study.   3. Mildly to moderately decreased global left ventricular systolic   function.   4. Global cardiomyopathy.   5. Normal left ventricular internal cavity size.   6. The left ventricular diastolic function could not be assessed in this   study.   7. Moderate pleural effusion in the left lateral region.   8. Small pericardial effusion.   9. Moderate mitral valve regurgitation.  10. Structurally normal mitral valve, with normal leaflet excursion.  11. Degenerative tricuspid valve.  12. Moderate-severe tricuspid regurgitation.  13. Sclerotic aortic valve with normal opening.  14. Trace pulmonic valve regurgitation.  15. Compared with the prior study from 7/19/18, global LVEF is somewhat   improved. RV appears mildly enlarged and moderate to severetricuspid   insufficiency and moderate mitral insufficiency noted.    < end of copied text >    LABS:	 	                        8.4    9.40  )-----------( 210      ( 04 Aug 2018 03:18 )             26.3     08-04    141  |  96  |  18  ----------------------------<  79  3.0<L>   |  37<H>  |  0.55    Ca    8.7      04 Aug 2018 03:18  Phos  2.6     08-04  Mg     1.6     08-04

## 2018-08-04 NOTE — PROGRESS NOTE ADULT - SUBJECTIVE AND OBJECTIVE BOX
INTERVAL HPI/OVERNIGHT EVENTS: Patient able to communicate through writing and hand motions. States that she is lactose intolerant and that tube feeds are making her uncomfortable Turns out there are milk products in the tube feeds she is receiving. Otherwise feeling well.    Vital Signs Last 24 Hrs  T(C): 37.1 (04 Aug 2018 06:07), Max: 37.6 (03 Aug 2018 20:04)  T(F): 98.8 (04 Aug 2018 06:07), Max: 99.7 (03 Aug 2018 20:04)  HR: 61 (04 Aug 2018 06:02) (51 - 74)  BP: 90/55 (04 Aug 2018 06:00) (82/52 - 140/69)  BP(mean): 63 (04 Aug 2018 06:00) (58 - 85)  RR: 17 (04 Aug 2018 06:00) (17 - 28)  SpO2: 100% (04 Aug 2018 06:02) (95% - 100%)    MEDICATIONS  (STANDING):  aspirin  chewable 81 milliGRAM(s) Oral daily  chlorhexidine 0.12% Liquid 15 milliLiter(s) Swish and Spit two times a day  chlorhexidine 4% Liquid 1 Application(s) Topical <User Schedule>  fluconAZOLE   Tablet 200 milliGRAM(s) Oral daily  furosemide   Injectable 20 milliGRAM(s) IV Push two times a day  heparin  Injectable 5000 Unit(s) SubCutaneous every 8 hours  meropenem  IVPB 1000 milliGRAM(s) IV Intermittent every 8 hours  meropenem  IVPB      midodrine 10 milliGRAM(s) Oral every 8 hours  propofol Infusion 10 MICROgram(s)/kG/Min (5.37 mL/Hr) IV Continuous <Continuous>  simethicone drops 80 milliGRAM(s) Oral daily  vancomycin  IVPB      vancomycin  IVPB 750 milliGRAM(s) IV Intermittent every 12 hours    MEDICATIONS  (PRN):  fentaNYL    Injectable 100 MICROGram(s) IV Push every 1 hour PRN sedation  midazolam Injectable 2 milliGRAM(s) IV Push every 4 hours PRN sedation      PHYSICAL EXAM    GENERAL: AAO in NAD  CHEST/LUNG: Intubated on minimal settings.  HEART: RRR  ABDOMEN: Softly distended. Non-tender. No peritoneal signs.    I&O:  I&O's Detail    02 Aug 2018 07:01  -  03 Aug 2018 07:00  --------------------------------------------------------  IN:    Solution: 500 mL    Solution: 500 mL    Solution: 100 mL    Solution: 250 mL    Solution: 100 mL    Vital HN: 510 mL  Total IN: 1960 mL    OUT:    Indwelling Catheter - Urethral: 4555 mL  Total OUT: 4555 mL    Total NET: -2595 mL      03 Aug 2018 07:01  -  04 Aug 2018 06:48  --------------------------------------------------------  IN:    Enteral Tube Flush: 60 mL    Solution: 250 mL    Solution: 400 mL    Solution: 250 mL    Solution: 250 mL    Solution: 500 mL    Solution: 150 mL    Vital HN: 1200 mL  Total IN: 3060 mL    OUT:    Indwelling Catheter - Urethral: 3785 mL  Total OUT: 3785 mL    Total NET: -725 mL          LABS:                        8.4    9.40  )-----------( 210      ( 04 Aug 2018 03:18 )             26.3     08-04    141  |  96  |  18  ----------------------------<  79  3.0<L>   |  37<H>  |  0.55    Ca    8.7      04 Aug 2018 03:18  Phos  2.6     08-04  Mg     1.6     08-04    TPro  7.4  /  Alb  2.2<L>  /  TBili  2.4<H>  /  DBili  x   /  AST  59<H>  /  ALT  39  /  AlkPhos  94  08-02        Culture Results:   No growth to date. (08-02 @ 23:38)  Culture Results:   No growth to date. (08-02 @ 17:50)  Culture Results:   No growth to date. (08-02 @ 12:43)        Impression: 54F in ICU for respi failure, sepsis 2/2 large calculus in left kidney.    Plan:  Continue nova  ABX  Appreciate ICU care  will need urologic intervention once medically stable.
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
## 2018-08-04 NOTE — PROGRESS NOTE ADULT - ASSESSMENT
54-year-old female with no known medical history apparently not seeing physicians or keeping up on her medical care presented with respiratory distress, hepatic encephalopathy/metabolic acidosis/hepatic failure required recurring intubations and possible PEA arrest status post DC cardioversion for A. fib with RVR. She had a septic shock requiring vasopressors and ventilatory management sepsis felt to be related to pyelonephritis with staghorn calculus of the left kidney. Consideration for tracheostomy ongoing. LVEF of 30% since somewhat improved to about 40-45%.    To continue on:  aspirin  chewable 81 milliGRAM(s) Oral daily  chlorhexidine 0.12% Liquid 15 milliLiter(s) Swish and Spit two times a day  chlorhexidine 4% Liquid 1 Application(s) Topical <User Schedule>  fluconAZOLE   Tablet 200 milliGRAM(s) Oral daily  furosemide   Injectable 20 milliGRAM(s) IV Push two times a day  heparin  Injectable 5000 Unit(s) SubCutaneous every 8 hours  meropenem  IVPB 1000 milliGRAM(s) IV Intermittent every 8 hours  midodrine 10 milliGRAM(s) Oral every 8 hours  simethicone drops 80 milliGRAM(s) Oral daily     vancomycin  IVPB 750 milliGRAM(s) IV Intermittent every 12 hours    Multidisciplinary and supportive CCU care management.    Rip Gaston MD, FACC

## 2018-08-04 NOTE — PROGRESS NOTE ADULT - SUBJECTIVE AND OBJECTIVE BOX
TMAX - 99.7    On day # 17 Meropenem / # 17 Vanco / # 14 Diflucan now    Vital Signs Last 24 Hrs  T(C): 37.1 (04 Aug 2018 12:00), Max: 37.6 (03 Aug 2018 20:04)  T(F): 98.8 (04 Aug 2018 12:00), Max: 99.7 (03 Aug 2018 20:04)  HR: 72 (04 Aug 2018 15:30) (51 - 76)  BP: 116/77 (04 Aug 2018 15:30) (81/58 - 120/67)  BP(mean): 86 (04 Aug 2018 15:30) (53 - 86)  RR: 29 (04 Aug 2018 15:30) (14 - 29)  SpO2: 96% (04 Aug 2018 15:30) (85% - 100%)  Mode: CPAP with PS  FiO2: 30  PEEP: 5  PS: 10  Supplemental O2:  on 30% FIO2 + 5 PEEP now      Awake, alert, remains orally intubated on ventilator, but c/o RLQ abdominal pain now.  No N/V and tolerating tube feedings.  No c/o cp, SOB, or back pain now.  Remains in RSR now.      PHYSICAL EXAM  General:  awake, alert, on ventilator, cooperative, in NAD, lyig in bed in semi-upright position  HEENT:  conj pink, sclerae anicteric PERRLA, orally intubated and with NGT in place and feedings in progress  Neck: semi-supple, no nodes noted           Rt. EJ IV in place     Heart:  RR  Lungs:  decreased BS at bases bilaterally, no wheezing noted  Abdomen:  BS+, semi-soft, mildly tender in the RLQ region- no rebound noted                    + abdominal wall edema  Extremities:  2+ edema LE's                    arms and hands remain swollen                    LUE with Midline in place - site clean with dressing dry and intact - placed 7/24/18   Skin:  warm, dry, no rash noted  Lyons in place and draining clear sadia-colored urine presently      I&O's Summary :    03 Aug 2018 07:01  -  04 Aug 2018 07:00  --------------------------------------------------------  IN: 3120 mL / OUT: 3985 mL / NET: -865 mL    04 Aug 2018 07:01  -  04 Aug 2018 16:01  --------------------------------------------------------  IN: 250 mL / OUT: 575 mL / NET: -325 mL      LABS:  CBC Full  -  ( 04 Aug 2018 03:18 )  WBC Count : 9.40 K/uL  Hemoglobin : 8.4 g/dL  Hematocrit : 26.3 %  Platelet Count - Automated : 210 K/uL  Mean Cell Volume : 90.7 fl  Mean Cell Hemoglobin : 29.0 pg  Mean Cell Hemoglobin Concentration : 31.9 gm/dL  Auto Neutrophil # : x  Auto Lymphocyte # : x  Auto Monocyte # : x  Auto Eosinophil # : x  Auto Basophil # : x  Auto Neutrophil % : x  Auto Lymphocyte % : x  Auto Monocyte % : x  Auto Eosinophil % : x  Auto Basophil % : x    08-04    141  |  96  |  18  ----------------------------<  79  3.0<L>   |  37<H>  |  0.55    Ca    8.7      04 Aug 2018 03:18  Phos  2.6     08-04  Mg     1.6     08-04        MICROBIOLOGY:  Specimen Source: .Blood Blood (08-02 @ 23:38)  Culture Results:   No growth to date. (08-02 @ 23:38)    Specimen Source: .Blood Blood (08-02 @ 17:50)  Culture Results:   No growth to date. (08-02 @ 17:50)    Specimen Source: .Sputum Sputum trap (08-02 @ 12:43)  Culture Results:   No growth (08-02 @ 12:43)        Legionella Antigen, Urine: Negative (08-02 @ 12:40)        Radiology:  < from: Xray Chest 1 View AP/PA. (08.03.18 @ 07:05) >  COMPARISON: 8/2/2018    FINDINGS:     Endotracheal and gastric tubes are noted.    There are mildly improved bilateral lung opacities. The cardiac and   mediastinal contours are prominent, which may be due to magnification   from AP technique and shallow inspiration. The osseous structures are   intact.    IMPRESSION:    Mildly improved bilateral lung opacities.          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  Repeat Blood and Sputum Cx's remain negative to date and WBC's have been trending downwards.  ? etiology of Rt. side abdominal pain ?    Suggestions:  Will continue current ab rx and continue to follow-up temps and labs closely.  Follow-up CXR and respiratory status.

## 2018-08-05 LAB
ANION GAP SERPL CALC-SCNC: 10 MMOL/L — SIGNIFICANT CHANGE UP (ref 5–17)
BUN SERPL-MCNC: 16 MG/DL — SIGNIFICANT CHANGE UP (ref 7–23)
CALCIUM SERPL-MCNC: 8.3 MG/DL — LOW (ref 8.5–10.1)
CHLORIDE SERPL-SCNC: 99 MMOL/L — SIGNIFICANT CHANGE UP (ref 96–108)
CO2 SERPL-SCNC: 32 MMOL/L — HIGH (ref 22–31)
CREAT SERPL-MCNC: 0.53 MG/DL — SIGNIFICANT CHANGE UP (ref 0.5–1.3)
GLUCOSE SERPL-MCNC: 79 MG/DL — SIGNIFICANT CHANGE UP (ref 70–99)
HCT VFR BLD CALC: 25.6 % — LOW (ref 34.5–45)
HGB BLD-MCNC: 8.3 G/DL — LOW (ref 11.5–15.5)
MAGNESIUM SERPL-MCNC: 1.8 MG/DL — SIGNIFICANT CHANGE UP (ref 1.6–2.6)
MCHC RBC-ENTMCNC: 29.2 PG — SIGNIFICANT CHANGE UP (ref 27–34)
MCHC RBC-ENTMCNC: 32.4 GM/DL — SIGNIFICANT CHANGE UP (ref 32–36)
MCV RBC AUTO: 90.1 FL — SIGNIFICANT CHANGE UP (ref 80–100)
NRBC # BLD: 0 /100 WBCS — SIGNIFICANT CHANGE UP (ref 0–0)
PHOSPHATE SERPL-MCNC: 3.2 MG/DL — SIGNIFICANT CHANGE UP (ref 2.5–4.5)
PLATELET # BLD AUTO: 250 K/UL — SIGNIFICANT CHANGE UP (ref 150–400)
POTASSIUM SERPL-MCNC: 3.2 MMOL/L — LOW (ref 3.5–5.3)
POTASSIUM SERPL-SCNC: 3.2 MMOL/L — LOW (ref 3.5–5.3)
RBC # BLD: 2.84 M/UL — LOW (ref 3.8–5.2)
RBC # FLD: 20.9 % — HIGH (ref 10.3–14.5)
SODIUM SERPL-SCNC: 141 MMOL/L — SIGNIFICANT CHANGE UP (ref 135–145)
WBC # BLD: 8.76 K/UL — SIGNIFICANT CHANGE UP (ref 3.8–10.5)
WBC # FLD AUTO: 8.76 K/UL — SIGNIFICANT CHANGE UP (ref 3.8–10.5)

## 2018-08-05 PROCEDURE — 99291 CRITICAL CARE FIRST HOUR: CPT

## 2018-08-05 RX ORDER — ALPRAZOLAM 0.25 MG
0.25 TABLET ORAL ONCE
Qty: 0 | Refills: 0 | Status: DISCONTINUED | OUTPATIENT
Start: 2018-08-05 | End: 2018-08-05

## 2018-08-05 RX ORDER — POTASSIUM CHLORIDE 20 MEQ
40 PACKET (EA) ORAL ONCE
Qty: 0 | Refills: 0 | Status: COMPLETED | OUTPATIENT
Start: 2018-08-05 | End: 2018-08-05

## 2018-08-05 RX ADMIN — Medication 81 MILLIGRAM(S): at 12:59

## 2018-08-05 RX ADMIN — Medication 20 MILLIGRAM(S): at 06:38

## 2018-08-05 RX ADMIN — CHLORHEXIDINE GLUCONATE 15 MILLILITER(S): 213 SOLUTION TOPICAL at 06:43

## 2018-08-05 RX ADMIN — FLUCONAZOLE 200 MILLIGRAM(S): 150 TABLET ORAL at 12:59

## 2018-08-05 RX ADMIN — Medication 40 MILLIEQUIVALENT(S): at 06:43

## 2018-08-05 RX ADMIN — Medication 20 MILLIGRAM(S): at 18:05

## 2018-08-05 RX ADMIN — MEROPENEM 100 MILLIGRAM(S): 1 INJECTION INTRAVENOUS at 13:55

## 2018-08-05 RX ADMIN — HEPARIN SODIUM 5000 UNIT(S): 5000 INJECTION INTRAVENOUS; SUBCUTANEOUS at 22:50

## 2018-08-05 RX ADMIN — Medication 0.25 MILLIGRAM(S): at 12:59

## 2018-08-05 RX ADMIN — CHLORHEXIDINE GLUCONATE 1 APPLICATION(S): 213 SOLUTION TOPICAL at 02:30

## 2018-08-05 RX ADMIN — CHLORHEXIDINE GLUCONATE 15 MILLILITER(S): 213 SOLUTION TOPICAL at 18:05

## 2018-08-05 RX ADMIN — HEPARIN SODIUM 5000 UNIT(S): 5000 INJECTION INTRAVENOUS; SUBCUTANEOUS at 13:55

## 2018-08-05 RX ADMIN — Medication 250 MILLIGRAM(S): at 06:42

## 2018-08-05 RX ADMIN — MEROPENEM 100 MILLIGRAM(S): 1 INJECTION INTRAVENOUS at 06:42

## 2018-08-05 RX ADMIN — MIDODRINE HYDROCHLORIDE 20 MILLIGRAM(S): 2.5 TABLET ORAL at 15:10

## 2018-08-05 RX ADMIN — Medication 250 MILLIGRAM(S): at 18:04

## 2018-08-05 RX ADMIN — SIMETHICONE 80 MILLIGRAM(S): 80 TABLET, CHEWABLE ORAL at 12:59

## 2018-08-05 RX ADMIN — MIDODRINE HYDROCHLORIDE 20 MILLIGRAM(S): 2.5 TABLET ORAL at 22:50

## 2018-08-05 RX ADMIN — HEPARIN SODIUM 5000 UNIT(S): 5000 INJECTION INTRAVENOUS; SUBCUTANEOUS at 06:42

## 2018-08-05 RX ADMIN — MEROPENEM 100 MILLIGRAM(S): 1 INJECTION INTRAVENOUS at 22:50

## 2018-08-05 RX ADMIN — MIDODRINE HYDROCHLORIDE 20 MILLIGRAM(S): 2.5 TABLET ORAL at 06:43

## 2018-08-05 NOTE — PROGRESS NOTE ADULT - SUBJECTIVE AND OBJECTIVE BOX
HPI:  Pt is a 53 yo F allegedly with no known medical history presented with respiratory distress, hepatic encephalopathy/metabolic acidosis/hepatic failure/possible PEA arrest status post DC cardioversion for A. fib with RVR.  s/p septic shock 2 to emphysematous pyelonephritis. ICU course complicated by failure to wean with extubation X2 with subsequent reintubation all in the setting of flash pulmonary edema, and long standing shock state requiring pressors.  Pt transferred to [facility] on  and re-intubated/transferred to the ICU on  2 to acute pulm edema    ## Labs:  CBC:                        8.3    8.76  )-----------( 250      ( 05 Aug 2018 04:10 )             25.6     Chem:  -    141  |  99  |  16  ----------------------------<  79  3.2<L>   |  32<H>  |  0.53    Ca    8.3<L>      05 Aug 2018 04:10  Phos  3.2     -  Mg     1.8           Coags:          ## Imaging:    ## Medications:  fluconAZOLE   Tablet 200 milliGRAM(s) Oral daily  meropenem  IVPB 1000 milliGRAM(s) IV Intermittent every 8 hours  meropenem  IVPB      vancomycin  IVPB      vancomycin  IVPB 750 milliGRAM(s) IV Intermittent every 12 hours    furosemide   Injectable 20 milliGRAM(s) IV Push two times a day  midodrine 20 milliGRAM(s) Oral every 8 hours        aspirin  chewable 81 milliGRAM(s) Oral daily  heparin  Injectable 5000 Unit(s) SubCutaneous every 8 hours    simethicone drops 80 milliGRAM(s) Oral daily    fentaNYL    Injectable 100 MICROGram(s) IV Push every 1 hour PRN  midazolam Injectable 2 milliGRAM(s) IV Push every 4 hours PRN      ## Vitals:  T(C): 36.2 (18 @ 15:00), Max: 37.7 (18 @ 02:30)  HR: 84 (18 @ 20:00) (50 - 89)  BP: 116/85 (18 @ 20:00) (80/48 - 145/124)  BP(mean): 92 (18 @ 20:00) (56 - 129)  RR: 26 (18 @ 20:00) (0 - 30)  SpO2: 100% (18 @ 20:00) (86% - 100%)  Wt(kg): --  Vent: Mode: CPAP with PS, RR (patient): 26, FiO2: 30, PEEP: 5, PS: 5  AB-04 @ 07:01  -   @ 07:00  --------------------------------------------------------  IN: 1650 mL / OUT: 1555 mL / NET: 95 mL     @ 07:01  -   @ 21:08  --------------------------------------------------------  IN: 1050 mL / OUT: 700 mL / NET: 350 mL          ## P/E:  Gen: Sitting comfortably in chair in no apparent distress  Mouth: (+) ETT  Lungs: R rhonchi  Heart: RRR  Abd: Soft/+BS  Ext: LE edema  Neuro: Awake/alert    CENTRAL LINE: [ ] YES [ ] NO  LOCATION:   DATE INSERTED:  REMOVE: [ ] YES [ ] NO      MOLLY: [x] YES [ ] NO    DATE INSERTED:  REMOVE:  [ ] YES [ ] NO      A-LINE:  [ ] YES [ ] NO  LOCATION:   DATE INSERTED:  REMOVE:  [ ] YES [ ] NO  EXPLAIN:    CODE STATUS: [x ] full code  [ ] DNR  [ ] DNI  [ ] [facility]  Goals of care discussion: [ ] yes

## 2018-08-05 NOTE — PROGRESS NOTE ADULT - ASSESSMENT
54-year-old female with no known medical history apparently not seeing physicians or keeping up on her medical care presented with respiratory distress, hepatic encephalopathy/metabolic acidosis/hepatic failure required recurring intubations and possible PEA arrest status post DC cardioversion for A. fib with RVR. She had a septic shock requiring vasopressors and ventilatory management sepsis felt to be related to pyelonephritis with staghorn calculus of the left kidney. Consideration for tracheostomy ongoing. LVEF of 30% since somewhat improved to about 40-45%.    MEDICATIONS  (STANDING):  aspirin  chewable 81 milliGRAM(s) Oral daily  chlorhexidine 0.12% Liquid 15 milliLiter(s) Swish and Spit two times a day  chlorhexidine 4% Liquid 1 Application(s) Topical <User Schedule>  fluconAZOLE   Tablet 200 milliGRAM(s) Oral daily  furosemide   Injectable 20 milliGRAM(s) IV Push two times a day  heparin  Injectable 5000 Unit(s) SubCutaneous every 8 hours  meropenem  IVPB 1000 milliGRAM(s) IV Intermittent every 8 hours  midodrine 20 milliGRAM(s) Oral every 8 hours  simethicone drops 80 milliGRAM(s) Oral daily    vancomycin  IVPB 750 milliGRAM(s) IV Intermittent every 12 hours    supportive care.  Rip Gaston MD, FACC

## 2018-08-05 NOTE — PROGRESS NOTE ADULT - ASSESSMENT
Pt is a 53 yo F allegedly with no known medical history presented with respiratory distress, hepatic encephalopathy/metabolic acidosis/hepatic failure/possible PEA arrest status post DC cardioversion for A. fib with RVR.  s/p septic shock 2 to emphysematous pyelonephritis. ICU course complicated by failure to wean with extubation X2 with subsequent reintubation all in the setting of flash pulmonary edema, and long standing shock state requiring pressors.  Pt transferred to [facility] on 7/31 and re-intubated/transferred to the ICU on 8/2 2 to acute pulm edema    Resp/Social: Cont daily SBT/ Need to contact family member to discuss possible trach  ID: Abx/Antifungal as per ID  CVS: Cont Midodrine and diuresis  FEN: Cont enteral feeds/ Replace K; pt hypokalemic  Neuro: Sedation prn    CCT: 40 min

## 2018-08-05 NOTE — PROGRESS NOTE ADULT - SUBJECTIVE AND OBJECTIVE BOX
Patient is a 54y old  Female who presents with a chief complaint of Sepsis/septic shock, ?HRS, CRS, Liver failure, severe HAGMA. Intubated again last night for confusional state and hypoxemia.      PAST MEDICAL & SURGICAL HISTORY  no known medical history    INTERVAL HISTORY: vented, sedated  	    ICU Vital Signs Last 24 Hrs  Vital Signs Last 24 Hrs  T(C): 36.2 (05 Aug 2018 15:00), Max: 37.7 (05 Aug 2018 02:30)  T(F): 97.2 (05 Aug 2018 15:00), Max: 99.9 (05 Aug 2018 02:30)  HR: 52 (05 Aug 2018 17:30) (52 - 89)  BP: 103/71 (05 Aug 2018 17:00) (80/48 - 145/124)  BP(mean): 79 (05 Aug 2018 17:00) (56 - 129)  RR: 18 (05 Aug 2018 17:30) (0 - 30)  SpO2: 91% (05 Aug 2018 17:30) (86% - 100%)    PHYSICAL EXAM:  Neuro: Opens eyes and is somewhat responsive.  CV: S1 S2 RRR, + systolic murmur   Lungs: diminished to bases   GI: mildly distended,  BS +  Extremities: + edema, + anasarca , chronic stasis changes    	    RADIOLOGY:   < from: Xray Chest 1 View- PORTABLE-Urgent (08.02.18 @ 12:42) >    EXAM:  XR CHEST PORTABLE URGENT 1V                            PROCEDURE DATE:  08/02/2018          INTERPRETATION:    DATE OF STUDY: 8/2/18 at 12:14PM.    PRIOR: Earlier 8/2/18 chest.    CLINICAL INDICATION: Readjust ET and NG tubes.    TECHNIQUE: portable chest - done semierect.    FINDINGS:   ET tube tipi is 3.0cm above the nivia.  NG tube is in the stomach.  There is stable cardiomegaly.  Mild improvement in pulmonary edema and decrease in left pleural effusion.  No significant right pleural effusion.  No pneumothorax.  The bony structures are intact.    IMPRESSION:   Mild improvement in pulmonary edema.  Mild decrease in left pleural effusion.  Life supporting devices in appropriate position.      < end of copied text >      < from: TTE Limited Echo w/o Cont (08.01.18 @ 20:03) >   1. Left ventricular ejection fraction, by visual estimation, is 40 to   45%.   2. Technically limited study.   3. Mildly to moderately decreased global left ventricular systolic   function.   4. Global cardiomyopathy.   5. Normal left ventricular internal cavity size.   6. The left ventricular diastolic function could not be assessed in this   study.   7. Moderate pleural effusion in the left lateral region.   8. Small pericardial effusion.   9. Moderate mitral valve regurgitation.  10. Structurally normal mitral valve, with normal leaflet excursion.  11. Degenerative tricuspid valve.  12. Moderate-severe tricuspid regurgitation.  13. Sclerotic aortic valve with normal opening.  14. Trace pulmonic valve regurgitation.  15. Compared with the prior study from 7/19/18, global LVEF is somewhat   improved. RV appears mildly enlarged and moderate to severetricuspid   insufficiency and moderate mitral insufficiency noted.    < end of copied text >    LABS:	 	                          8.3    8.76  )-----------( 250      ( 05 Aug 2018 04:10 )             25.6     08-05    141  |  99  |  16  ----------------------------<  79  3.2<L>   |  32<H>  |  0.53    Ca    8.3<L>      05 Aug 2018 04:10  Phos  3.2     08-05  Mg     1.8     08-05

## 2018-08-05 NOTE — PROGRESS NOTE ADULT - SUBJECTIVE AND OBJECTIVE BOX
Patient seen and examined bedside in ICU, intubated.    T(F): 99.6 (08-05-18 @ 12:01), Max: 99.9 (08-05-18 @ 02:30)  HR: 89 (08-05-18 @ 12:30) (54 - 89)  BP: 113/80 (08-05-18 @ 12:00) (80/48 - 123/79)  RR: 22 (08-05-18 @ 12:30) (0 - 29)  SpO2: 100% (08-05-18 @ 12:30) (91% - 100%)    PHYSICAL EXAM    GENERAL: NAD, intubated  CHEST/LUNG: Mechanical breath sounds b/l  HEART: +S1S2  ABDOMEN: Softly distended. Non-tender. No peritoneal signs.  : Nova draining clear yellow urine (output 1555cc/24hr)    LABS:                        8.3    8.76  )-----------( 250      ( 05 Aug 2018 04:10 )             25.6   08-05    141  |  99  |  16  ----------------------------<  79  3.2<L>   |  32<H>  |  0.53    Ca    8.3<L>      05 Aug 2018 04:10  Phos  3.2     08-05  Mg     1.8     08-05      I&O's Detail    04 Aug 2018 07:01  -  05 Aug 2018 07:00  --------------------------------------------------------  IN:    Enteral Tube Flush: 150 mL    ns in tub fed  [id number]: 750 mL    Solution: 250 mL    Solution: 400 mL    Vital HN: 100 mL  Total IN: 1650 mL    OUT:    Indwelling Catheter - Urethral: 1555 mL  Total OUT: 1555 mL    Total NET: 95 mL      05 Aug 2018 07:01  -  05 Aug 2018 13:01  --------------------------------------------------------  IN:    ns in tub fed  [id number]: 90 mL  Total IN: 90 mL    OUT:    Indwelling Catheter - Urethral: 375 mL  Total OUT: 375 mL    Total NET: -285 mL    Impression: 54F admitted with sepsis 2/2 large calculus in left kidney, in ICU for recurrent resp failure,     Plan:  Continue nova, monitor urine output  Trend renal function  ABX  Continue ICU care  will need urologic intervention once medically stable.

## 2018-08-06 LAB
ALBUMIN SERPL ELPH-MCNC: 2.2 G/DL — LOW (ref 3.3–5)
ALBUMIN SERPL ELPH-MCNC: 2.2 G/DL — LOW (ref 3.3–5)
ALP SERPL-CCNC: 107 U/L — SIGNIFICANT CHANGE UP (ref 40–120)
ALP SERPL-CCNC: 108 U/L — SIGNIFICANT CHANGE UP (ref 40–120)
ALT FLD-CCNC: 28 U/L — SIGNIFICANT CHANGE UP (ref 12–78)
ALT FLD-CCNC: 29 U/L — SIGNIFICANT CHANGE UP (ref 12–78)
ANION GAP SERPL CALC-SCNC: 7 MMOL/L — SIGNIFICANT CHANGE UP (ref 5–17)
AST SERPL-CCNC: 33 U/L — SIGNIFICANT CHANGE UP (ref 15–37)
AST SERPL-CCNC: 34 U/L — SIGNIFICANT CHANGE UP (ref 15–37)
BILIRUB DIRECT SERPL-MCNC: 2.01 MG/DL — HIGH (ref 0.05–0.2)
BILIRUB INDIRECT FLD-MCNC: 0.4 MG/DL — SIGNIFICANT CHANGE UP (ref 0.2–1)
BILIRUB SERPL-MCNC: 2.4 MG/DL — HIGH (ref 0.2–1.2)
BILIRUB SERPL-MCNC: 2.4 MG/DL — HIGH (ref 0.2–1.2)
BUN SERPL-MCNC: 17 MG/DL — SIGNIFICANT CHANGE UP (ref 7–23)
CALCIUM SERPL-MCNC: 8.7 MG/DL — SIGNIFICANT CHANGE UP (ref 8.5–10.1)
CHLORIDE SERPL-SCNC: 99 MMOL/L — SIGNIFICANT CHANGE UP (ref 96–108)
CO2 SERPL-SCNC: 34 MMOL/L — HIGH (ref 22–31)
CREAT SERPL-MCNC: 0.63 MG/DL — SIGNIFICANT CHANGE UP (ref 0.5–1.3)
GLUCOSE SERPL-MCNC: 112 MG/DL — HIGH (ref 70–99)
HCT VFR BLD CALC: 28.8 % — LOW (ref 34.5–45)
HGB BLD-MCNC: 9.1 G/DL — LOW (ref 11.5–15.5)
MAGNESIUM SERPL-MCNC: 1.9 MG/DL — SIGNIFICANT CHANGE UP (ref 1.6–2.6)
MCHC RBC-ENTMCNC: 29.3 PG — SIGNIFICANT CHANGE UP (ref 27–34)
MCHC RBC-ENTMCNC: 31.6 GM/DL — LOW (ref 32–36)
MCV RBC AUTO: 92.6 FL — SIGNIFICANT CHANGE UP (ref 80–100)
NRBC # BLD: 0 /100 WBCS — SIGNIFICANT CHANGE UP (ref 0–0)
PHOSPHATE SERPL-MCNC: 3.7 MG/DL — SIGNIFICANT CHANGE UP (ref 2.5–4.5)
PLATELET # BLD AUTO: 273 K/UL — SIGNIFICANT CHANGE UP (ref 150–400)
POTASSIUM SERPL-MCNC: 3.6 MMOL/L — SIGNIFICANT CHANGE UP (ref 3.5–5.3)
POTASSIUM SERPL-SCNC: 3.6 MMOL/L — SIGNIFICANT CHANGE UP (ref 3.5–5.3)
PROT SERPL-MCNC: 6.8 GM/DL — SIGNIFICANT CHANGE UP (ref 6–8.3)
PROT SERPL-MCNC: 6.9 GM/DL — SIGNIFICANT CHANGE UP (ref 6–8.3)
RBC # BLD: 3.11 M/UL — LOW (ref 3.8–5.2)
RBC # FLD: 20.4 % — HIGH (ref 10.3–14.5)
SODIUM SERPL-SCNC: 140 MMOL/L — SIGNIFICANT CHANGE UP (ref 135–145)
WBC # BLD: 7.69 K/UL — SIGNIFICANT CHANGE UP (ref 3.8–10.5)
WBC # FLD AUTO: 7.69 K/UL — SIGNIFICANT CHANGE UP (ref 3.8–10.5)

## 2018-08-06 PROCEDURE — 71045 X-RAY EXAM CHEST 1 VIEW: CPT | Mod: 26

## 2018-08-06 PROCEDURE — 99232 SBSQ HOSP IP/OBS MODERATE 35: CPT

## 2018-08-06 PROCEDURE — 99291 CRITICAL CARE FIRST HOUR: CPT

## 2018-08-06 RX ORDER — POTASSIUM CHLORIDE 20 MEQ
40 PACKET (EA) ORAL ONCE
Qty: 0 | Refills: 0 | Status: DISCONTINUED | OUTPATIENT
Start: 2018-08-06 | End: 2018-08-06

## 2018-08-06 RX ORDER — FUROSEMIDE 40 MG
40 TABLET ORAL ONCE
Qty: 0 | Refills: 0 | Status: COMPLETED | OUTPATIENT
Start: 2018-08-06 | End: 2018-08-06

## 2018-08-06 RX ADMIN — CHLORHEXIDINE GLUCONATE 1 APPLICATION(S): 213 SOLUTION TOPICAL at 05:00

## 2018-08-06 RX ADMIN — MEROPENEM 100 MILLIGRAM(S): 1 INJECTION INTRAVENOUS at 15:07

## 2018-08-06 RX ADMIN — Medication 250 MILLIGRAM(S): at 06:45

## 2018-08-06 RX ADMIN — MIDAZOLAM HYDROCHLORIDE 2 MILLIGRAM(S): 1 INJECTION, SOLUTION INTRAMUSCULAR; INTRAVENOUS at 22:00

## 2018-08-06 RX ADMIN — CHLORHEXIDINE GLUCONATE 15 MILLILITER(S): 213 SOLUTION TOPICAL at 18:25

## 2018-08-06 RX ADMIN — MEROPENEM 100 MILLIGRAM(S): 1 INJECTION INTRAVENOUS at 22:30

## 2018-08-06 RX ADMIN — HEPARIN SODIUM 5000 UNIT(S): 5000 INJECTION INTRAVENOUS; SUBCUTANEOUS at 15:07

## 2018-08-06 RX ADMIN — Medication 81 MILLIGRAM(S): at 12:07

## 2018-08-06 RX ADMIN — CHLORHEXIDINE GLUCONATE 15 MILLILITER(S): 213 SOLUTION TOPICAL at 06:40

## 2018-08-06 RX ADMIN — MIDODRINE HYDROCHLORIDE 20 MILLIGRAM(S): 2.5 TABLET ORAL at 06:45

## 2018-08-06 RX ADMIN — MIDODRINE HYDROCHLORIDE 20 MILLIGRAM(S): 2.5 TABLET ORAL at 15:07

## 2018-08-06 RX ADMIN — FLUCONAZOLE 200 MILLIGRAM(S): 150 TABLET ORAL at 12:07

## 2018-08-06 RX ADMIN — SIMETHICONE 80 MILLIGRAM(S): 80 TABLET, CHEWABLE ORAL at 12:07

## 2018-08-06 RX ADMIN — MEROPENEM 100 MILLIGRAM(S): 1 INJECTION INTRAVENOUS at 06:45

## 2018-08-06 RX ADMIN — Medication 40 MILLIGRAM(S): at 18:17

## 2018-08-06 RX ADMIN — Medication 20 MILLIGRAM(S): at 06:44

## 2018-08-06 RX ADMIN — MIDODRINE HYDROCHLORIDE 20 MILLIGRAM(S): 2.5 TABLET ORAL at 22:30

## 2018-08-06 RX ADMIN — Medication 250 MILLIGRAM(S): at 18:26

## 2018-08-06 RX ADMIN — HEPARIN SODIUM 5000 UNIT(S): 5000 INJECTION INTRAVENOUS; SUBCUTANEOUS at 06:44

## 2018-08-06 RX ADMIN — HEPARIN SODIUM 5000 UNIT(S): 5000 INJECTION INTRAVENOUS; SUBCUTANEOUS at 22:30

## 2018-08-06 NOTE — PROGRESS NOTE ADULT - ASSESSMENT
55 y/o F initially admitted for septic shock secondary to emphysematous pyelonephritis c/b acute respiratory failure, extubated multiple times with multiple reintubations, no doing well on the ventilator.     - Sedation as needed, goal RASS -1 to 0  - Daily SAT/SBT  - Diuresis, goal net negative 1L  - Attempted to discuss trach with patient, however patient does not appear to have understanding of the procedure after it is explained to her. Therefore I do not feel she has capacity to make the decision regarding trach. Will continue to attempt to contact family about possible trach if she fails extubation again  - Appreciate ID assistance, continue antibiotics.  - Appreciate urology assistance, possible future intervention  - Continue midodrine, wean as tolerated  - Tube feeds  - Heparin SC, PPI    Attending critical care time 35 minutes

## 2018-08-06 NOTE — PROGRESS NOTE ADULT - SUBJECTIVE AND OBJECTIVE BOX
Patient seen and examined bedside in no acute distress.  Patient intubated on min settings, alert and awake, refusing blood draws this AM, agitated overnight.   Nova draining well.    T(F): 98.8 (08-06-18 @ 05:27), Max: 99.6 (08-05-18 @ 12:01)  HR: 65 (08-06-18 @ 05:00) (50 - 89)  BP: 101/73 (08-06-18 @ 05:00) (91/73 - 150/98)  RR: 18 (08-06-18 @ 05:00) (16 - 30)  SpO2: 77% (08-06-18 @ 04:00) (77% - 100%)    PHYSICAL EXAM    GENERAL: NAD, alert and awake  CHEST/LUNG: Intubated on min settings   HEART: +S1S2, RRR  ABDOMEN: Soft, NTND.  : Nova draining clear yellow urine (output 1400cc/24hr)    LABS:                        8.3    8.76  )-----------( 250      ( 05 Aug 2018 04:10 )             25.6   08-05    141  |  99  |  16  ----------------------------<  79  3.2<L>   |  32<H>  |  0.53    Ca    8.3<L>      05 Aug 2018 04:10  Phos  3.2     08-05  Mg     1.8     08-05      I&O's Detail    04 Aug 2018 07:01  -  05 Aug 2018 07:00  --------------------------------------------------------  IN:    Enteral Tube Flush: 150 mL    ns in tub fed  [id number]: 750 mL    Solution: 250 mL    Solution: 400 mL    Vital HN: 100 mL  Total IN: 1650 mL    OUT:    Indwelling Catheter - Urethral: 1555 mL  Total OUT: 1555 mL    Total NET: 95 mL      05 Aug 2018 07:01  -  06 Aug 2018 05:39  --------------------------------------------------------  IN:    Enteral Tube Flush: 210 mL    ns in tub fed  [id number]: 1140 mL    Solution: 250 mL    Solution: 100 mL  Total IN: 1700 mL    OUT:    Indwelling Catheter - Urethral: 1400 mL  Total OUT: 1400 mL    Total NET: 300 mL    Impression: 54F admitted with sepsis 2/2 large calculus in left kidney, in ICU for recurrent resp failure,     Plan:  Continue nova, monitor urine output  Trend renal function  ABX  Continue ICU care, may need Trach?  will need urologic intervention once medically stable.  D/w Dr. Frances

## 2018-08-06 NOTE — PROGRESS NOTE ADULT - ASSESSMENT
HPI:  See H and P for full details.  See in  ED noted to be in respiratory distress on Bipap with metabolic acidosis.  Given 2 amps bicarb and bicarb drip for metabolic acidosis, lactate 12. Spoke to her at length, she does not doctor has not seen and MD in many years. She was AAand 0 x 4 knows who the president is in spite of her hepatic encephalopathy, Denies ETOH/drugs/Hx of HIV or liver disease. Noted to be in fulminant hepatic failure, t bili 10.7, INR 2.76, AST 84  . Denies Tylenol consumption or any toxic ingestion. Post cardioversion for Afib with RVR earlier today, reportedly lost a pulse. In ICU, made decision to intubate . premedicated with 4 mg mversed, 100 mcg fent, started on propofol at 40. Intubated by myself on first attempt with glidescope 7.5 ETT to lip 21, placed RIJ TLC on first attempt. Bedside echo with RV dilation, severe TR, septal bowing, LV EF appears normal to slighlty decreased, I got LVOT VTI of 10 possibly indicating a component of LV failure that to me seems secondary to a primary RV proscess, possibly portopulmonary HTN. Basically my question is: is this a chronic liver picture causing portopulmonary HTN and RV failure or is this a primary RV failure causing hepatic congestion. Will check formal echo, fractionate the bilis, send HIV, hepatitis and autoimmune work up. Will treat the fulminant hepatic failure with N-acetylcycteine protocol which has good evidence behind it for all forms of fulminant hepatic failure, f/u a tylenol level, add lactulose for the hyperammonemia,Aztrenaom flagyl for intrabdominal proscess in setting of PCN allergey with unknown reaction, check urine electrolytes to R/O hepatorenal syndrome, levophed to keep MAP > 65 mmhg, Check formal echo, trend cardiac enzymes. GI, renal, cards consults. C/W bicarb drip for now is making some urine. Non contrast CT H/C/A/P to evaluate for sources of sepsis.  Prognosis is guarded. She told me she has a daughter named Osvaldo in [place] who she wishes to be her HCP, thou sounds like she may be sestranged from this daughter.  CCM time initial 46 min plus another 1 hour, total 1 hr and 46 minuted included recieving patient from ER, preparation for intubation  intubation, central line placement bedside echo.  ----------------------- as Above --------------------------------------------------------------------------------------------------  Np history obtainable besides above. Intubated unresponsive. Notes reviewed. See Ct scan / sonogram. Bilirubin shows improvement over a short period of time  ---------------- Elevated Bili > transaminases/alk phos  - Seconadry to acute on chronic liver disease , VS side effects of medications. 1) supportive care  2) f/u LFTs 3) work up for underlying liver disease

## 2018-08-06 NOTE — PROGRESS NOTE ADULT - PROBLEM SELECTOR PLAN 1
Bili 2.4   [id number]- relatively unchanged.    No signs of hemochromatosis. MAZIN  1 : 320 .  - Supportive care for now ( follow up labs ordered ).

## 2018-08-06 NOTE — PROGRESS NOTE ADULT - SUBJECTIVE AND OBJECTIVE BOX
YMAX - 99.9    On day # 19 Meropenem / # 19 Vanco / # 16 Diflucan now    Vital Signs Last 24 Hrs  T(C): 36.7 (06 Aug 2018 15:32), Max: 37.4 (06 Aug 2018 08:30)  T(F): 98 (06 Aug 2018 15:32), Max: 99.4 (06 Aug 2018 08:30)  HR: 75 (06 Aug 2018 16:00) (50 - 84)  BP: 125/90 (06 Aug 2018 16:00) (95/63 - 171/77)  BP(mean): 99 (06 Aug 2018 16:00) (70 - 108)  RR: 23 (06 Aug 2018 16:00) (15 - 30)  SpO2: 96% (06 Aug 2018 16:00) (77% - 100%)  Mode: CPAP with PS  FiO2: 30  PEEP: 5  PS: 5  MAP: 7  PIP: 10  Supplemental O2:  on 30% FIO2 + 5 PEEP      Awake, alert, sitting up in a chair still orally intubated on ventilator.  No c/o SOB, no cp, no abdominal pain now.        PHYSICAL EXAM  General:  arousable, on ventilator, sitting up in the chair today, on 30% FIO2 + 5 PEEP, appears fairly comfortable, following commands and nodding in response to questions  HEENT: conj pink, sclerae muddy, PERRLA, orally intubated, and with NGT in place with feedings in progress.   Neck:  semi-supple, no nodes noted  Heart:  RR  Lungs:  few anterior rhonchi with mild wheeze upper airways bilaterally  Abdomen: BS+, semi-soft, nontender to palpation now, + abdominal wall edema persists  Extremities:  2+ edema LE's                     arms and hands slightly less swollen now                     LUE with Midline in place - site clean with dressing dry and intact - placed 7/24/18  Skin:  warm, dry, no rash noted  Lyons in place with sadia-colored urine in the bag now        I&O's Summary :    05 Aug 2018 07:01  -  06 Aug 2018 07:00  --------------------------------------------------------  IN: 2050 mL / OUT: 1400 mL / NET: 650 mL    06 Aug 2018 07:01  -  06 Aug 2018 16:52  --------------------------------------------------------  IN: 200 mL / OUT: 150 mL / NET: 50 mL      LABS:  CBC Full  -  ( 06 Aug 2018 09:22 )  WBC Count : 7.69 K/uL  Hemoglobin : 9.1 g/dL  Hematocrit : 28.8 %  Platelet Count - Automated : 273 K/uL  Mean Cell Volume : 92.6 fl  Mean Cell Hemoglobin : 29.3 pg  Mean Cell Hemoglobin Concentration : 31.6 gm/dL  Auto Neutrophil # : x  Auto Lymphocyte # : x  Auto Monocyte # : x  Auto Eosinophil # : x  Auto Basophil # : x  Auto Neutrophil % : x  Auto Lymphocyte % : x  Auto Monocyte % : x  Auto Eosinophil % : x  Auto Basophil % : x    08-06    140  |  99  |  17  ----------------------------<  112<H>  3.6   |  34<H>  |  0.63    Ca    8.7      06 Aug 2018 09:22  Phos  3.7     08-06  Mg     1.9     08-06    TPro  6.8  /  Alb  2.2<L>  /  TBili  2.4<H>  /  DBili  2.01<H>  /  AST  34  /  ALT  29  /  AlkPhos  107  08-06    LIVER FUNCTIONS - ( 06 Aug 2018 09:22 )  Alb: 2.2 g/dL / Pro: 6.8 gm/dL / ALK PHOS: 107 U/L / ALT: 29 U/L / AST: 34 U/L / GGT: x               MICROBIOLOGY:  Specimen Source: .Blood Blood (08-02 @ 23:38)  Culture Results:   No growth to date. (08-02 @ 23:38)    Specimen Source: .Blood Blood (08-02 @ 17:50)  Culture Results:   No growth to date. (08-02 @ 17:50)    Specimen Source: .Sputum Sputum trap (08-02 @ 12:43)  Culture Results:   No growth (08-02 @ 12:43)          Legionella Antigen, Urine: Negative (08-02 @ 12:40)        Radiology:   CXR -   < from: Xray Chest 1 View AP/PA. (08.06.18 @ 07:43) >  INTERPRETATION:  AP semierect chest on August 6, 2018 at 6:54 AM. Patient   requires intubation.    The heart is magnified by technique. Endotracheal tube and nasogastric   tube remain.    On August 3 there were fairly extensive scattered infiltrates and   probable left lower lobe atelectasis. Most of the infiltrates show   improvement but there is persistent atelectasis of the left lower lobe.    IMPRESSION: Improved infiltrates but persistent atelectasis left lower   lobe.        Impression:  Slowly improving clinically on present ab rx with Meropenem + Vanco + Diflucan for rx of Sepsis with Shock and Respiratory Failure initially due to Lt Emphysematous Pyelonephritis with an underlying Staghorn Calculus, s/p Rapid AFib with initial cardioversion to RSR in the ER, now with recurrent episode of Respiratory Failure requiring re-intubation ( 3 rd episode ) due to Multifocal PNA.  Temps are low-grade and WBC's have decreased, and today's CXR reports some improvement in bilateral infiltrates.    Suggestions:  Will continue current ab rx and follow-up temps and labs closely.  Follow-up CXR.  Discussed with patient at bedside.

## 2018-08-06 NOTE — PROGRESS NOTE ADULT - SUBJECTIVE AND OBJECTIVE BOX
Patient is a 54y old  Female who presents with a chief complaint of Sepsis/septic shock, ?HRS, CRS, Liver failure, severe HAGMA. Intubated again last night for confusional state and hypoxemia.      PAST MEDICAL & SURGICAL HISTORY  no known medical history    INTERVAL HISTORY: vented, sedated  	    ICU Vital Signs Last 24 Hrs  Vital Signs Last 24 Hrs  T(C): 36.3 (06 Aug 2018 13:00), Max: 37.4 (06 Aug 2018 08:30)  T(F): 97.4 (06 Aug 2018 13:00), Max: 99.4 (06 Aug 2018 08:30)  HR: 72 (06 Aug 2018 14:00) (50 - 84)  BP: 113/68 (06 Aug 2018 14:00) (91/73 - 171/77)  BP(mean): 81 (06 Aug 2018 14:00) (70 - 108)  RR: 23 (06 Aug 2018 14:00) (15 - 30)  SpO2: 98% (06 Aug 2018 14:00) (77% - 100%)    PHYSICAL EXAM:  Neuro: Opens eyes and is somewhat responsive.  CV: S1 S2 RRR, + systolic murmur   Lungs: diminished to bases   GI: mildly distended,  BS +  Extremities: + edema, + anasarca , chronic stasis changes    	    RADIOLOGY:   < from: Xray Chest 1 View- PORTABLE-Urgent (08.02.18 @ 12:42) >    EXAM:  XR CHEST PORTABLE URGENT 1V                            PROCEDURE DATE:  08/02/2018          INTERPRETATION:    DATE OF STUDY: 8/2/18 at 12:14PM.    PRIOR: Earlier 8/2/18 chest.    CLINICAL INDICATION: Readjust ET and NG tubes.    TECHNIQUE: portable chest - done semierect.    FINDINGS:   ET tube tipi is 3.0cm above the nivia.  NG tube is in the stomach.  There is stable cardiomegaly.  Mild improvement in pulmonary edema and decrease in left pleural effusion.  No significant right pleural effusion.  No pneumothorax.  The bony structures are intact.    IMPRESSION:   Mild improvement in pulmonary edema.  Mild decrease in left pleural effusion.  Life supporting devices in appropriate position.      < end of copied text >      < from: TTE Limited Echo w/o Cont (08.01.18 @ 20:03) >   1. Left ventricular ejection fraction, by visual estimation, is 40 to   45%.   2. Technically limited study.   3. Mildly to moderately decreased global left ventricular systolic   function.   4. Global cardiomyopathy.   5. Normal left ventricular internal cavity size.   6. The left ventricular diastolic function could not be assessed in this   study.   7. Moderate pleural effusion in the left lateral region.   8. Small pericardial effusion.   9. Moderate mitral valve regurgitation.  10. Structurally normal mitral valve, with normal leaflet excursion.  11. Degenerative tricuspid valve.  12. Moderate-severe tricuspid regurgitation.  13. Sclerotic aortic valve with normal opening.  14. Trace pulmonic valve regurgitation.  15. Compared with the prior study from 7/19/18, global LVEF is somewhat   improved. RV appears mildly enlarged and moderate to severetricuspid   insufficiency and moderate mitral insufficiency noted.    < end of copied text >    LABS:	 	                                     9.1    7.69  )-----------( 273      ( 06 Aug 2018 09:22 )             28.8       08-06    140  |  99  |  17  ----------------------------<  112<H>  3.6   |  34<H>  |  0.63    Ca    8.7      06 Aug 2018 09:22  Phos  3.7     08-06  Mg     1.9     08-06    TPro  6.8  /  Alb  2.2<L>  /  TBili  2.4<H>  /  DBili  2.01<H>  /  AST  34  /  ALT  29  /  AlkPhos  107  08-06

## 2018-08-06 NOTE — PROGRESS NOTE ADULT - ASSESSMENT
54-year-old female with no known medical history apparently not seeing physicians or keeping up on her medical care presented with respiratory distress, hepatic encephalopathy/metabolic acidosis/hepatic failure required recurring intubations and status post DC cardioversion for A. fib with RVR. She had a septic shock requiring vasopressors and ventilatory management sepsis felt to be related to pyelonephritis with staghorn calculus of the left kidney. Consideration for tracheostomy ongoing. LVEF of 30% since somewhat improved to about 40-45%.      MEDICATIONS  (STANDING):  aspirin  chewable 81 milliGRAM(s) Oral daily  chlorhexidine 0.12% Liquid 15 milliLiter(s) Swish and Spit two times a day  chlorhexidine 4% Liquid 1 Application(s) Topical <User Schedule>  fluconAZOLE   Tablet 200 milliGRAM(s) Oral daily  furosemide   Injectable 20 milliGRAM(s) IV Push two times a day  heparin  Injectable 5000 Unit(s) SubCutaneous every 8 hours  meropenem  IVPB 1000 milliGRAM(s) IV Intermittent every 8 hours   midodrine 20 milliGRAM(s) Oral every 8 hours  simethicone drops 80 milliGRAM(s) Oral daily    vancomycin  IVPB 750 milliGRAM(s) IV Intermittent every 12 hours      supportive care & possible trach being considered.   Rip Gaston MD, FACC
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
## 2018-08-07 LAB
ANION GAP SERPL CALC-SCNC: 11 MMOL/L — SIGNIFICANT CHANGE UP (ref 5–17)
BUN SERPL-MCNC: 17 MG/DL — SIGNIFICANT CHANGE UP (ref 7–23)
CALCIUM SERPL-MCNC: 9 MG/DL — SIGNIFICANT CHANGE UP (ref 8.5–10.1)
CHLORIDE SERPL-SCNC: 98 MMOL/L — SIGNIFICANT CHANGE UP (ref 96–108)
CO2 SERPL-SCNC: 33 MMOL/L — HIGH (ref 22–31)
CREAT SERPL-MCNC: 0.65 MG/DL — SIGNIFICANT CHANGE UP (ref 0.5–1.3)
CULTURE RESULTS: SIGNIFICANT CHANGE UP
GLUCOSE BLDC GLUCOMTR-MCNC: 84 MG/DL — SIGNIFICANT CHANGE UP (ref 70–99)
GLUCOSE SERPL-MCNC: 89 MG/DL — SIGNIFICANT CHANGE UP (ref 70–99)
HCT VFR BLD CALC: 30.5 % — LOW (ref 34.5–45)
HGB BLD-MCNC: 9.5 G/DL — LOW (ref 11.5–15.5)
MAGNESIUM SERPL-MCNC: 2.1 MG/DL — SIGNIFICANT CHANGE UP (ref 1.6–2.6)
MCHC RBC-ENTMCNC: 29.1 PG — SIGNIFICANT CHANGE UP (ref 27–34)
MCHC RBC-ENTMCNC: 31.1 GM/DL — LOW (ref 32–36)
MCV RBC AUTO: 93.3 FL — SIGNIFICANT CHANGE UP (ref 80–100)
NRBC # BLD: 0 /100 WBCS — SIGNIFICANT CHANGE UP (ref 0–0)
PHOSPHATE SERPL-MCNC: 2.9 MG/DL — SIGNIFICANT CHANGE UP (ref 2.5–4.5)
PLATELET # BLD AUTO: 300 K/UL — SIGNIFICANT CHANGE UP (ref 150–400)
POTASSIUM SERPL-MCNC: 3.4 MMOL/L — LOW (ref 3.5–5.3)
POTASSIUM SERPL-SCNC: 3.4 MMOL/L — LOW (ref 3.5–5.3)
RBC # BLD: 3.27 M/UL — LOW (ref 3.8–5.2)
RBC # FLD: 20.5 % — HIGH (ref 10.3–14.5)
SODIUM SERPL-SCNC: 142 MMOL/L — SIGNIFICANT CHANGE UP (ref 135–145)
SPECIMEN SOURCE: SIGNIFICANT CHANGE UP
WBC # BLD: 8.82 K/UL — SIGNIFICANT CHANGE UP (ref 3.8–10.5)
WBC # FLD AUTO: 8.82 K/UL — SIGNIFICANT CHANGE UP (ref 3.8–10.5)

## 2018-08-07 PROCEDURE — 99232 SBSQ HOSP IP/OBS MODERATE 35: CPT

## 2018-08-07 PROCEDURE — 99291 CRITICAL CARE FIRST HOUR: CPT

## 2018-08-07 PROCEDURE — 71045 X-RAY EXAM CHEST 1 VIEW: CPT | Mod: 26

## 2018-08-07 RX ORDER — POTASSIUM CHLORIDE 20 MEQ
10 PACKET (EA) ORAL
Qty: 0 | Refills: 0 | Status: COMPLETED | OUTPATIENT
Start: 2018-08-07 | End: 2018-08-07

## 2018-08-07 RX ORDER — FUROSEMIDE 40 MG
40 TABLET ORAL ONCE
Qty: 0 | Refills: 0 | Status: COMPLETED | OUTPATIENT
Start: 2018-08-07 | End: 2018-08-07

## 2018-08-07 RX ORDER — FUROSEMIDE 40 MG
40 TABLET ORAL EVERY 12 HOURS
Qty: 0 | Refills: 0 | Status: DISCONTINUED | OUTPATIENT
Start: 2018-08-07 | End: 2018-08-11

## 2018-08-07 RX ADMIN — FENTANYL CITRATE 100 MICROGRAM(S): 50 INJECTION INTRAVENOUS at 18:30

## 2018-08-07 RX ADMIN — Medication 40 MILLIGRAM(S): at 05:20

## 2018-08-07 RX ADMIN — MIDODRINE HYDROCHLORIDE 20 MILLIGRAM(S): 2.5 TABLET ORAL at 05:10

## 2018-08-07 RX ADMIN — HEPARIN SODIUM 5000 UNIT(S): 5000 INJECTION INTRAVENOUS; SUBCUTANEOUS at 05:10

## 2018-08-07 RX ADMIN — CHLORHEXIDINE GLUCONATE 15 MILLILITER(S): 213 SOLUTION TOPICAL at 19:33

## 2018-08-07 RX ADMIN — MIDAZOLAM HYDROCHLORIDE 2 MILLIGRAM(S): 1 INJECTION, SOLUTION INTRAMUSCULAR; INTRAVENOUS at 23:07

## 2018-08-07 RX ADMIN — CHLORHEXIDINE GLUCONATE 1 APPLICATION(S): 213 SOLUTION TOPICAL at 05:11

## 2018-08-07 RX ADMIN — MIDODRINE HYDROCHLORIDE 20 MILLIGRAM(S): 2.5 TABLET ORAL at 13:40

## 2018-08-07 RX ADMIN — Medication 40 MILLIGRAM(S): at 18:09

## 2018-08-07 RX ADMIN — MEROPENEM 100 MILLIGRAM(S): 1 INJECTION INTRAVENOUS at 05:10

## 2018-08-07 RX ADMIN — Medication 100 MILLIEQUIVALENT(S): at 07:30

## 2018-08-07 RX ADMIN — Medication 20 MILLIGRAM(S): at 05:10

## 2018-08-07 RX ADMIN — Medication 81 MILLIGRAM(S): at 13:40

## 2018-08-07 RX ADMIN — MIDAZOLAM HYDROCHLORIDE 2 MILLIGRAM(S): 1 INJECTION, SOLUTION INTRAMUSCULAR; INTRAVENOUS at 05:30

## 2018-08-07 RX ADMIN — CHLORHEXIDINE GLUCONATE 15 MILLILITER(S): 213 SOLUTION TOPICAL at 05:11

## 2018-08-07 RX ADMIN — Medication 100 MILLIEQUIVALENT(S): at 09:00

## 2018-08-07 RX ADMIN — HEPARIN SODIUM 5000 UNIT(S): 5000 INJECTION INTRAVENOUS; SUBCUTANEOUS at 22:18

## 2018-08-07 RX ADMIN — MEROPENEM 100 MILLIGRAM(S): 1 INJECTION INTRAVENOUS at 22:18

## 2018-08-07 RX ADMIN — Medication 100 MILLIEQUIVALENT(S): at 06:27

## 2018-08-07 RX ADMIN — MEROPENEM 100 MILLIGRAM(S): 1 INJECTION INTRAVENOUS at 13:49

## 2018-08-07 RX ADMIN — HEPARIN SODIUM 5000 UNIT(S): 5000 INJECTION INTRAVENOUS; SUBCUTANEOUS at 13:39

## 2018-08-07 RX ADMIN — Medication 250 MILLIGRAM(S): at 05:10

## 2018-08-07 RX ADMIN — FLUCONAZOLE 200 MILLIGRAM(S): 150 TABLET ORAL at 13:41

## 2018-08-07 RX ADMIN — Medication 250 MILLIGRAM(S): at 18:09

## 2018-08-07 RX ADMIN — MIDAZOLAM HYDROCHLORIDE 2 MILLIGRAM(S): 1 INJECTION, SOLUTION INTRAMUSCULAR; INTRAVENOUS at 18:09

## 2018-08-07 NOTE — PROGRESS NOTE ADULT - ASSESSMENT
54-year-old female with no known medical history apparently not seeing physicians or keeping up on her medical care presented with respiratory distress, hepatic encephalopathy/metabolic acidosis/hepatic failure required recurring intubations and status post DC cardioversion for A. fib with RVR. She had a septic shock requiring vasopressors and ventilatory management sepsis felt to be related to pyelonephritis with staghorn calculus of the left kidney. Consideration for tracheostomy ongoing. LVEF of 30% since somewhat improved to about 40-45%.    HRs have remains stable; no recent RVR.  Overall issue is pulm and difficulty with extubation, and persistent sepsis from pyelonephritis / staghorn calculus of the left kidney.  Would continue care as per ICU team.  Will sign off for now; please call back with any further questions.

## 2018-08-07 NOTE — PROGRESS NOTE ADULT - SUBJECTIVE AND OBJECTIVE BOX
Surgery NP note  Patient seen and examined bedside  No complaints offered.  Intubated  Nova catheter in place.     T(F): 99.1 (08-07-18 @ 05:53), Max: 99.4 (08-06-18 @ 08:30)  HR: 56 (08-07-18 @ 06:00) (51 - 84)  BP: 102/75 (08-07-18 @ 06:00) (95/68 - 171/77)  RR: 18 (08-07-18 @ 06:00) (16 - 30)  SpO2: 100% (08-07-18 @ 06:00) (87% - 100%)  Wt(kg): --  CAPILLARY BLOOD GLUCOSE      PHYSICAL EXAM:  General: NAD, WDWN.   Neuro:  Alert & responsive  HEENT: NCAT, EOMI, conjunctiva clear, intubated  CV: +S1+S2 regular rate and rhythm  Lung: clear to ausculation bilaterally, respirations nonlabored, good inspiratory effort  Abdomen: soft, Non tender, No Distension. Normal active BS  Nova in SITU with Clear yellow urine 3050ml/24hrs    LABS:                        9.5    8.82  )-----------( 300      ( 07 Aug 2018 04:01 )             30.5     08-07    142  |  98  |  17  ----------------------------<  89  3.4<L>   |  33<H>  |  0.65    Ca    9.0      07 Aug 2018 04:01  Phos  2.9     08-07  Mg     2.1     08-07    TPro  6.8  /  Alb  2.2<L>  /  TBili  2.4<H>  /  DBili  2.01<H>  /  AST  34  /  ALT  29  /  AlkPhos  107  08-06    LIVER FUNCTIONS - ( 06 Aug 2018 09:22 )  Alb: 2.2 g/dL / Pro: 6.8 gm/dL / ALK PHOS: 107 U/L / ALT: 29 U/L / AST: 34 U/L / GGT: x             I&O's Detail    05 Aug 2018 07:01  -  06 Aug 2018 07:00  --------------------------------------------------------  IN:    Enteral Tube Flush: 210 mL    ns in tub fed  [id number]: 1190 mL    Solution: 500 mL    Solution: 150 mL  Total IN: 2050 mL    OUT:    Indwelling Catheter - Urethral: 1400 mL  Total OUT: 1400 mL    Total NET: 650 mL      06 Aug 2018 07:01  -  07 Aug 2018 06:51  --------------------------------------------------------  IN:    ns in tub fed  [id number]: 1150 mL    Solution: 100 mL    Solution: 250 mL    Solution: 500 mL  Total IN: 2000 mL    OUT:    Indwelling Catheter - Urethral: 3050 mL  Total OUT: 3050 mL    Total NET: -1050 mL          Impression: 54F admitted with sepsis 2/2 large calculus in left kidney, in ICU for recurrent resp failure,     Plan:  Continue nova, monitor urine output  Trend renal function  continue ABX  Continue ICU care  will need urologic intervention once medically stable.  -will discuss with surgical attending for Recommendations

## 2018-08-07 NOTE — PROGRESS NOTE ADULT - ASSESSMENT
53 y/o F initially admitted for septic shock secondary to emphysematous pyelonephritis c/b acute respiratory failure, extubated multiple times with multiple reintubations, no doing well on the ventilator.     - Sedation as needed, goal RASS -1 to 0  - Daily SAT/SBT  - Diuresis, goal net negative 1-2L  - Possible extubation tomorrow  - Appreciate ID assistance, continue antibiotics.  - Appreciate urology assistance, possible future intervention  - Continue midodrine, wean as tolerated  - Tube feeds  - Heparin SC, PPI    Attending critical care time 35 minutes
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
## 2018-08-07 NOTE — PROGRESS NOTE ADULT - SUBJECTIVE AND OBJECTIVE BOX
TMAX - 99.4    On day # 20 Meropenem / # 20 Vanco / # 17 Diflucan now    Vital Signs Last 24 Hrs  T(C): 37.1 (07 Aug 2018 12:42), Max: 37.4 (07 Aug 2018 07:42)  T(F): 98.8 (07 Aug 2018 12:42), Max: 99.4 (07 Aug 2018 07:42)  HR: 57 (07 Aug 2018 12:10) (47 - 84)  BP: 110/57 (07 Aug 2018 11:00) (95/68 - 133/78)  BP(mean): 68 (07 Aug 2018 11:00) (68 - 104)  RR: 21 (07 Aug 2018 11:00) (16 - 27)  SpO2: 98% (07 Aug 2018 12:10) (87% - 100%)  Mode: CPAP with PS  FiO2: 30  PEEP: 5  PS: 5  MAP: 6  PIP: 10  Supplemental O2:  on 30% FIO2 + 5 PEEP       Remains orally intubated on ventilator c/o feeling cold now, but denies SOB or cp.  No c/o abdominal pain or back pain presently.      PHYSICAL EXAM  General:  awake, alert, on ventilator, following commands, c/o feeling cold, but in NAD  HEENT:  conj pink, sclerae anicteric, PERRLA, orally intubated and with NGT in place with feedings in progress now  Neck:  supple, no nodes noted, Rt EJ IV remains in place  Heart:  RR  Lungs:  few anterior rhonchi bilaterally  Abdomen:  BS +, softer, some decrease of abdominal wall edema noted, non-tender to palpation  Extremities:  2+ edema LE's                     arms and hands with decreasing edema                      LUE with Midline in place - site clean and dressing dry and intact - placed 7/24  Skin:  warm, dry, no rash noted  Lyons remains in place with clear sadia-colored urine in the bag      I&O's Summary :    06 Aug 2018 07:01  -  07 Aug 2018 07:00  --------------------------------------------------------  IN: 2000 mL / OUT: 3050 mL / NET: -1050 mL    07 Aug 2018 07:01  -  07 Aug 2018 15:20  --------------------------------------------------------  IN: 200 mL / OUT: 200 mL / NET: 0 mL      LABS:  CBC Full  -  ( 07 Aug 2018 04:01 )  WBC Count : 8.82 K/uL  Hemoglobin : 9.5 g/dL  Hematocrit : 30.5 %  Platelet Count - Automated : 300 K/uL  Mean Cell Volume : 93.3 fl  Mean Cell Hemoglobin : 29.1 pg  Mean Cell Hemoglobin Concentration : 31.1 gm/dL  Auto Neutrophil # : x  Auto Lymphocyte # : x  Auto Monocyte # : x  Auto Eosinophil # : x  Auto Basophil # : x  Auto Neutrophil % : x  Auto Lymphocyte % : x  Auto Monocyte % : x  Auto Eosinophil % : x  Auto Basophil % : x    08-07    142  |  98  |  17  ----------------------------<  89  3.4<L>   |  33<H>  |  0.65    Ca    9.0      07 Aug 2018 04:01  Phos  2.9     08-07  Mg     2.1     08-07    TPro  6.8  /  Alb  2.2<L>  /  TBili  2.4<H>  /  DBili  2.01<H>  /  AST  34  /  ALT  29  /  AlkPhos  107  08-06    LIVER FUNCTIONS - ( 06 Aug 2018 09:22 )  Alb: 2.2 g/dL / Pro: 6.8 gm/dL / ALK PHOS: 107 U/L / ALT: 29 U/L / AST: 34 U/L / GGT: x               MICROBIOLOGY:  Specimen Source: .Blood Blood (08-02 @ 23:38)  Culture Results:   No growth to date. (08-02 @ 23:38)    Specimen Source: .Blood Blood (08-02 @ 17:50)  Culture Results:   No growth to date. (08-02 @ 17:50)    Specimen Source: .Sputum Sputum trap (08-02 @ 12:43)  Culture Results:   No growth (08-02 @ 12:43)          Radiology:   CXR - < from: Xray Chest 1 View AP/PA. (08.07.18 @ 07:24) >  INTERPRETATION:  AP semierect chest on August 7, 2018 at 6:29 AM. Patient   clinically has pulmonary edema.    Heart is enlarged. Endotracheal tube and nasogastric tube remain.    Diffuse mild infiltrates are again seen in the lungs likely with some   left base fluid consistent with CHF.    Chest is similar to August 6.    IMPRESSION: Unchanged CHF.        Impression:  Continues with slow improvement clinically on present ab rx with Meropenem + Vanco + Diflucan for rx of Sepsis with initial Shock and respiratory Failure secondary to Lt Emphysematous Pyelonephritis with and underlying Staghorn Calculus and initial Rapid AFib, s/p cardioversion in the ER to RSR, with recent episode of recurrent Respiratory Failure requiring re-intubation ( 3 rd episode ) due to Multifocal PNA and CHF.  repeat Blood and Sputum Cx's remain negative to date.    Suggestions:  Will continue current ab rx and follow-up temps and labs.  Will discuss with Urology further intervention as patient is at risk for recurrent episodes of Sepsis from a  source as long as she has the underlying Staghorn Calculus - ? Lt Nephrectomy ??

## 2018-08-08 LAB
ANION GAP SERPL CALC-SCNC: 12 MMOL/L — SIGNIFICANT CHANGE UP (ref 5–17)
BASOPHILS # BLD AUTO: 0.06 K/UL — SIGNIFICANT CHANGE UP (ref 0–0.2)
BASOPHILS NFR BLD AUTO: 0.6 % — SIGNIFICANT CHANGE UP (ref 0–2)
BUN SERPL-MCNC: 17 MG/DL — SIGNIFICANT CHANGE UP (ref 7–23)
CALCIUM SERPL-MCNC: 9.2 MG/DL — SIGNIFICANT CHANGE UP (ref 8.5–10.1)
CHLORIDE SERPL-SCNC: 99 MMOL/L — SIGNIFICANT CHANGE UP (ref 96–108)
CO2 SERPL-SCNC: 31 MMOL/L — SIGNIFICANT CHANGE UP (ref 22–31)
CREAT SERPL-MCNC: 0.74 MG/DL — SIGNIFICANT CHANGE UP (ref 0.5–1.3)
CRP SERPL-MCNC: 5.01 MG/DL — HIGH (ref 0–0.4)
CULTURE RESULTS: SIGNIFICANT CHANGE UP
EOSINOPHIL # BLD AUTO: 0.17 K/UL — SIGNIFICANT CHANGE UP (ref 0–0.5)
EOSINOPHIL NFR BLD AUTO: 1.7 % — SIGNIFICANT CHANGE UP (ref 0–6)
ERYTHROCYTE [SEDIMENTATION RATE] IN BLOOD: 49 MM/HR — HIGH (ref 0–20)
GLUCOSE SERPL-MCNC: 59 MG/DL — LOW (ref 70–99)
HCT VFR BLD CALC: 29.6 % — LOW (ref 34.5–45)
HGB BLD-MCNC: 9.3 G/DL — LOW (ref 11.5–15.5)
IMM GRANULOCYTES NFR BLD AUTO: 0.7 % — SIGNIFICANT CHANGE UP (ref 0–1.5)
LYMPHOCYTES # BLD AUTO: 4.26 K/UL — HIGH (ref 1–3.3)
LYMPHOCYTES # BLD AUTO: 43.6 % — SIGNIFICANT CHANGE UP (ref 13–44)
MAGNESIUM SERPL-MCNC: 1.8 MG/DL — SIGNIFICANT CHANGE UP (ref 1.6–2.6)
MCHC RBC-ENTMCNC: 29.2 PG — SIGNIFICANT CHANGE UP (ref 27–34)
MCHC RBC-ENTMCNC: 31.4 GM/DL — LOW (ref 32–36)
MCV RBC AUTO: 93.1 FL — SIGNIFICANT CHANGE UP (ref 80–100)
MONOCYTES # BLD AUTO: 1.24 K/UL — HIGH (ref 0–0.9)
MONOCYTES NFR BLD AUTO: 12.7 % — SIGNIFICANT CHANGE UP (ref 2–14)
NEUTROPHILS # BLD AUTO: 3.96 K/UL — SIGNIFICANT CHANGE UP (ref 1.8–7.4)
NEUTROPHILS NFR BLD AUTO: 40.7 % — LOW (ref 43–77)
NRBC # BLD: 0 /100 WBCS — SIGNIFICANT CHANGE UP (ref 0–0)
PHOSPHATE SERPL-MCNC: 2.6 MG/DL — SIGNIFICANT CHANGE UP (ref 2.5–4.5)
PLATELET # BLD AUTO: 301 K/UL — SIGNIFICANT CHANGE UP (ref 150–400)
POTASSIUM SERPL-MCNC: 3.7 MMOL/L — SIGNIFICANT CHANGE UP (ref 3.5–5.3)
POTASSIUM SERPL-SCNC: 3.7 MMOL/L — SIGNIFICANT CHANGE UP (ref 3.5–5.3)
RBC # BLD: 3.18 M/UL — LOW (ref 3.8–5.2)
RBC # FLD: 20.3 % — HIGH (ref 10.3–14.5)
SODIUM SERPL-SCNC: 142 MMOL/L — SIGNIFICANT CHANGE UP (ref 135–145)
SPECIMEN SOURCE: SIGNIFICANT CHANGE UP
WBC # BLD: 9.76 K/UL — SIGNIFICANT CHANGE UP (ref 3.8–10.5)
WBC # FLD AUTO: 9.76 K/UL — SIGNIFICANT CHANGE UP (ref 3.8–10.5)

## 2018-08-08 PROCEDURE — 99291 CRITICAL CARE FIRST HOUR: CPT

## 2018-08-08 RX ORDER — DEXMEDETOMIDINE HYDROCHLORIDE IN 0.9% SODIUM CHLORIDE 4 UG/ML
0.2 INJECTION INTRAVENOUS
Qty: 200 | Refills: 0 | Status: DISCONTINUED | OUTPATIENT
Start: 2018-08-08 | End: 2018-08-08

## 2018-08-08 RX ADMIN — CHLORHEXIDINE GLUCONATE 15 MILLILITER(S): 213 SOLUTION TOPICAL at 05:57

## 2018-08-08 RX ADMIN — MEROPENEM 100 MILLIGRAM(S): 1 INJECTION INTRAVENOUS at 22:33

## 2018-08-08 RX ADMIN — FENTANYL CITRATE 100 MICROGRAM(S): 50 INJECTION INTRAVENOUS at 00:50

## 2018-08-08 RX ADMIN — MEROPENEM 100 MILLIGRAM(S): 1 INJECTION INTRAVENOUS at 16:40

## 2018-08-08 RX ADMIN — HEPARIN SODIUM 5000 UNIT(S): 5000 INJECTION INTRAVENOUS; SUBCUTANEOUS at 05:57

## 2018-08-08 RX ADMIN — MEROPENEM 100 MILLIGRAM(S): 1 INJECTION INTRAVENOUS at 05:49

## 2018-08-08 RX ADMIN — Medication 40 MILLIGRAM(S): at 05:49

## 2018-08-08 RX ADMIN — FENTANYL CITRATE 100 MICROGRAM(S): 50 INJECTION INTRAVENOUS at 00:35

## 2018-08-08 RX ADMIN — Medication 250 MILLIGRAM(S): at 05:49

## 2018-08-08 RX ADMIN — DEXMEDETOMIDINE HYDROCHLORIDE IN 0.9% SODIUM CHLORIDE 4.47 MICROGRAM(S)/KG/HR: 4 INJECTION INTRAVENOUS at 01:31

## 2018-08-08 RX ADMIN — Medication 250 MILLIGRAM(S): at 17:18

## 2018-08-08 RX ADMIN — Medication 40 MILLIGRAM(S): at 17:19

## 2018-08-08 RX ADMIN — CHLORHEXIDINE GLUCONATE 1 APPLICATION(S): 213 SOLUTION TOPICAL at 05:49

## 2018-08-08 RX ADMIN — MIDODRINE HYDROCHLORIDE 20 MILLIGRAM(S): 2.5 TABLET ORAL at 22:30

## 2018-08-08 RX ADMIN — MIDODRINE HYDROCHLORIDE 20 MILLIGRAM(S): 2.5 TABLET ORAL at 16:40

## 2018-08-08 RX ADMIN — HEPARIN SODIUM 5000 UNIT(S): 5000 INJECTION INTRAVENOUS; SUBCUTANEOUS at 16:41

## 2018-08-08 NOTE — PROGRESS NOTE ADULT - SUBJECTIVE AND OBJECTIVE BOX
UROLOGY PROGRESS HPI:  Pt seen and examined at bedside resting comfortably.       Vital Signs Last 24 Hrs  T(C): 37.1 (08 Aug 2018 04:23), Max: 37.4 (07 Aug 2018 07:42)  T(F): 98.7 (08 Aug 2018 04:23), Max: 99.4 (07 Aug 2018 07:42)  HR: 69 (08 Aug 2018 02:00) (47 - 107)  BP: 107/54 (08 Aug 2018 02:00) (97/71 - 166/106)  BP(mean): 66 (08 Aug 2018 02:00) (66 - 114)  RR: 20 (08 Aug 2018 02:00) (15 - 33)  SpO2: 100% (08 Aug 2018 02:00) (82% - 100%)      PHYSICAL EXAM:    GENERAL: NAD  CHEST/LUNG: Clear to ausculation, bilaterally   HEART: RRR S1S2  ABDOMEN: NTND  : nova indwelling with clear yellow urine. no suprapubic tenderness  EXTREMITIES:  calf soft, non tender b/l    I&O's Detail    06 Aug 2018 07:01  -  07 Aug 2018 07:00  --------------------------------------------------------  IN:    ns in tub fed  [id number]: 1150 mL    Solution: 100 mL    Solution: 250 mL    Solution: 500 mL  Total IN: 2000 mL    OUT:    Indwelling Catheter - Urethral: 3050 mL  Total OUT: 3050 mL    Total NET: -1050 mL      07 Aug 2018 07:01  -  08 Aug 2018 05:46  --------------------------------------------------------  IN:    dexmedetomidine Infusion: 3.8 mL    ns in tub fed  [id number]: 500 mL    Solution: 250 mL    Solution: 100 mL    Solution: 50 mL  Total IN: 903.8 mL    OUT:    Indwelling Catheter - Urethral: 1980 mL  Total OUT: 1980 mL    Total NET: -1076.2 mL          LABS:                        9.3    9.76  )-----------( 301      ( 08 Aug 2018 03:52 )             29.6     08-08    142  |  99  |  17  ----------------------------<  59<L>  3.7   |  31  |  0.74    Ca    9.2      08 Aug 2018 03:52  Phos  2.6     08-08  Mg     1.8     08-08    TPro  6.8  /  Alb  2.2<L>  /  TBili  2.4<H>  /  DBili  2.01<H>  /  AST  34  /  ALT  29  /  AlkPhos  107  08-06        Impression: 54F admitted with sepsis 2/2 large calculus in left kidney, in ICU for recurrent resp failure,     Plan:  Continue nova, monitor urine output  Trend renal function  continue ABX  Continue ICU care  will need urologic intervention once medically stable
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
## 2018-08-08 NOTE — PROGRESS NOTE ADULT - SUBJECTIVE AND OBJECTIVE BOX
TMAX - 99.4    On day # 21 Meropenem / # 21 Vanco / # 18 Diflucan now    Vital Signs Last 24 Hrs  T(C): 36.1 (08 Aug 2018 11:54), Max: 37.2 (07 Aug 2018 23:26)  T(F): 97 (08 Aug 2018 11:54), Max: 99 (07 Aug 2018 23:26)  HR: 46 (08 Aug 2018 13:00) (46 - 107)  BP: 88/51 (08 Aug 2018 13:00) (82/56 - 166/106)  BP(mean): 61 (08 Aug 2018 13:00) (59 - 114)  RR: 16 (08 Aug 2018 13:00) (14 - 33)  SpO2: 86% (08 Aug 2018 12:00) (57% - 100%)  Mode: CPAP with PS  FiO2: 30  PEEP: 5  PS: 5  ITime: 0.9  MAP: 7  PIP: 10  Supplemental O2:  on NC O2 now      Arousable now, extubated earlier today and presently on NC O2.  No c/o cp, no SOB, and no abdominal pain, but somewhat fatigued.  NGT has been removed as well - for Speech and Swallowing evaluation prior to initiation of a po diet.  C/o cough with some mucous production.       PHYSICAL EXAM  General:  arousable, sitting upright in bed now with NC O2 in place  and with intermittent moist cough noted with some mucous expectorated  HEENT:  conj pink, sclerae anicteric, PERRLA, no oral lesions noted  Neck:  supple, no nodes noted            Rt, EJ IV in place - dressing dry and intact  Heart: RR   Lungs:  scattered bilateral moist rhonchi  Abdomen:  BS+, softer, nontender to palpation now  No CVA or Spinal tenderness to palpation  Extremities:  2 + edema LE's                    chronic skin changes of both LE's                    LUE with Midline in place - site clean and dressing dry and intact - placed 7/24  Skin:  warm, dry, no rash noted  Lyons remains in place with clear sadia-colored urine in the bag      I&O's Summary :    07 Aug 2018 07:01  -  08 Aug 2018 07:00  --------------------------------------------------------  IN: 1246 mL / OUT: 2480 mL / NET: -1234 mL    08 Aug 2018 07:01  -  08 Aug 2018 15:30  --------------------------------------------------------  IN: 0 mL / OUT: 900 mL / NET: -900 mL      LABS:  CBC Full  -  ( 08 Aug 2018 03:52 )  WBC Count : 9.76 K/uL  Hemoglobin : 9.3 g/dL  Hematocrit : 29.6 %  Platelet Count - Automated : 301 K/uL  Mean Cell Volume : 93.1 fl  Mean Cell Hemoglobin : 29.2 pg  Mean Cell Hemoglobin Concentration : 31.4 gm/dL  Auto Neutrophil # : 3.96 K/uL  Auto Lymphocyte # : 4.26 K/uL  Auto Monocyte # : 1.24 K/uL  Auto Eosinophil # : 0.17 K/uL  Auto Basophil # : 0.06 K/uL  Auto Neutrophil % : 40.7 %  Auto Lymphocyte % : 43.6 %  Auto Monocyte % : 12.7 %  Auto Eosinophil % : 1.7 %  Auto Basophil % : 0.6 %    08-08    142  |  99  |  17  ----------------------------<  59<L>  3.7   |  31  |  0.74    Ca    9.2      08 Aug 2018 03:52  Phos  2.6     08-08  Mg     1.8     08-08      Sedimentation Rate, Erythrocyte: 49 mm/hr (08-08 @ 03:52)    CRP - 5.01 ( 8/8/18 )        MICROBIOLOGY:  Specimen Source: .Blood Blood (08-02 @ 23:38)  Culture Results:   No growth at 5 days. (08-02 @ 23:38)    Specimen Source: .Blood Blood (08-02 @ 17:50)  Culture Results:   No growth at 5 days. 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    Suggestions:  Will continue current ab rx for now and continue to follow -up temps, labs, and CXR.  Urology follow-up re: ? surgical intervention - ? Lt. Nephrectomy ? - as concerned that patient will remain at risk for recurrent episodes of Sepsis from a  source as long as she has her underlying Staghorn Calculus.

## 2018-08-08 NOTE — PROGRESS NOTE ADULT - ASSESSMENT
55 y/o F initially admitted for septic shock secondary to emphysematous pyelonephritis c/b acute respiratory failure, extubated multiple times with multiple reintubations, no doing well on the ventilator.     - Sedation as needed, goal RASS -1 to 0  - Daily SAT/SBT  - Diuresis, goal net negative 1-2L  - Plan for extubation today  - Appreciate ID assistance, continue antibiotics.  - Appreciate urology assistance, possible future intervention  - Continue midodrine, wean as tolerated  - Hold feeds, speech and swallow eval following extubation  - Heparin SC, PPI    Attending critical care time 35 minutes

## 2018-08-09 LAB
ANION GAP SERPL CALC-SCNC: 11 MMOL/L — SIGNIFICANT CHANGE UP (ref 5–17)
BUN SERPL-MCNC: 15 MG/DL — SIGNIFICANT CHANGE UP (ref 7–23)
CALCIUM SERPL-MCNC: 8.1 MG/DL — LOW (ref 8.5–10.1)
CHLORIDE SERPL-SCNC: 101 MMOL/L — SIGNIFICANT CHANGE UP (ref 96–108)
CO2 SERPL-SCNC: 30 MMOL/L — SIGNIFICANT CHANGE UP (ref 22–31)
CREAT SERPL-MCNC: 0.65 MG/DL — SIGNIFICANT CHANGE UP (ref 0.5–1.3)
GLUCOSE SERPL-MCNC: 80 MG/DL — SIGNIFICANT CHANGE UP (ref 70–99)
HCT VFR BLD CALC: 29 % — LOW (ref 34.5–45)
HGB BLD-MCNC: 8.9 G/DL — LOW (ref 11.5–15.5)
MAGNESIUM SERPL-MCNC: 2 MG/DL — SIGNIFICANT CHANGE UP (ref 1.6–2.6)
MCHC RBC-ENTMCNC: 29.3 PG — SIGNIFICANT CHANGE UP (ref 27–34)
MCHC RBC-ENTMCNC: 30.7 GM/DL — LOW (ref 32–36)
MCV RBC AUTO: 95.4 FL — SIGNIFICANT CHANGE UP (ref 80–100)
NRBC # BLD: 0 /100 WBCS — SIGNIFICANT CHANGE UP (ref 0–0)
PHOSPHATE SERPL-MCNC: 3.2 MG/DL — SIGNIFICANT CHANGE UP (ref 2.5–4.5)
PLATELET # BLD AUTO: 274 K/UL — SIGNIFICANT CHANGE UP (ref 150–400)
POTASSIUM SERPL-MCNC: 3.8 MMOL/L — SIGNIFICANT CHANGE UP (ref 3.5–5.3)
POTASSIUM SERPL-SCNC: 3.8 MMOL/L — SIGNIFICANT CHANGE UP (ref 3.5–5.3)
RBC # BLD: 3.04 M/UL — LOW (ref 3.8–5.2)
RBC # FLD: 20.2 % — HIGH (ref 10.3–14.5)
SODIUM SERPL-SCNC: 142 MMOL/L — SIGNIFICANT CHANGE UP (ref 135–145)
WBC # BLD: 7.82 K/UL — SIGNIFICANT CHANGE UP (ref 3.8–10.5)
WBC # FLD AUTO: 7.82 K/UL — SIGNIFICANT CHANGE UP (ref 3.8–10.5)

## 2018-08-09 PROCEDURE — 99233 SBSQ HOSP IP/OBS HIGH 50: CPT

## 2018-08-09 RX ORDER — SIMETHICONE 80 MG/1
80 TABLET, CHEWABLE ORAL DAILY
Qty: 0 | Refills: 0 | Status: DISCONTINUED | OUTPATIENT
Start: 2018-08-09 | End: 2018-08-17

## 2018-08-09 RX ADMIN — Medication 250 MILLIGRAM(S): at 06:12

## 2018-08-09 RX ADMIN — Medication 81 MILLIGRAM(S): at 12:08

## 2018-08-09 RX ADMIN — HEPARIN SODIUM 5000 UNIT(S): 5000 INJECTION INTRAVENOUS; SUBCUTANEOUS at 15:02

## 2018-08-09 RX ADMIN — SIMETHICONE 80 MILLIGRAM(S): 80 TABLET, CHEWABLE ORAL at 12:08

## 2018-08-09 RX ADMIN — MEROPENEM 100 MILLIGRAM(S): 1 INJECTION INTRAVENOUS at 15:01

## 2018-08-09 RX ADMIN — MEROPENEM 100 MILLIGRAM(S): 1 INJECTION INTRAVENOUS at 06:12

## 2018-08-09 RX ADMIN — Medication 40 MILLIGRAM(S): at 17:47

## 2018-08-09 RX ADMIN — Medication 40 MILLIGRAM(S): at 06:12

## 2018-08-09 RX ADMIN — CHLORHEXIDINE GLUCONATE 1 APPLICATION(S): 213 SOLUTION TOPICAL at 06:12

## 2018-08-09 RX ADMIN — FLUCONAZOLE 200 MILLIGRAM(S): 150 TABLET ORAL at 12:08

## 2018-08-09 RX ADMIN — MIDODRINE HYDROCHLORIDE 20 MILLIGRAM(S): 2.5 TABLET ORAL at 06:12

## 2018-08-09 RX ADMIN — MIDODRINE HYDROCHLORIDE 20 MILLIGRAM(S): 2.5 TABLET ORAL at 15:02

## 2018-08-09 NOTE — CHART NOTE - NSCHARTNOTEFT_GEN_A_CORE
I spoke with patient's daughter ([phone]) and updated her about the patient's condition and hospital course. Daughter expressed understanding. I also spoke with the daughter about the patient's need for multiple intubations this hospital stay and informed her that if she needed to be intubated again she should receive a tracheostomy if that would be in accordance to her wishes. She reports that she does not believe that is something the patient would want, but that she would speak with her regarding what her wishes would be.

## 2018-08-09 NOTE — SWALLOW BEDSIDE ASSESSMENT ADULT - SWALLOW EVAL: PATIENT/FAMILY GOALS STATEMENT
pt reported that she cannot eat gluten, milk or egg products. pt reported that she cannot eat gluten, milk or egg products.  Pt denied difficulty chewing and stated "Why don't they give me a bagel or something"

## 2018-08-09 NOTE — SWALLOW BEDSIDE ASSESSMENT ADULT - COMMENTS
Xray Chest 8/7/2018 IMPRESSION: Unchanged CHF.    Intubated 7/19/2018  Extubated 8/8/2018 CT chest/Abdnoted to have: "Anasarca, mild bilateral pleural effusions. Small right pericardial effusion, small densities in the right mainstem bronchus may represent mucous material. Mild hazy ground glass densities in both lungs may represent pulmonary edema or pneumonia. The gallbladder appears contracted. Pericholecystic fluid versus gallbladder wall edema. Mild to moderate ascites in the abdomen and pelvis. Staghorn calculus in the left kidney. 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 Nonspecific gallbladder wall thickening. Left nephrolithiasis. Pt denies h/o ETOH, Tylenol OD, hepatitis, malignancy.  Pt admitted to the ICU now intubated on mechanical ventilation support for increased WOB and tachypnea likely 2/2 to respiratory compensation in the setting of severe HAGMA with LA of 12.2 and HCO3 of 7 now on Bicarb gtt, hypotension with sepsis/septic shock ?2/2 to nephrolithiasis with staghorn  vs. ?HRS vs. CRS, SHARYN likely in the setting of ischemic atn, fulminant liver failure.        Xray Chest 8/7/2018 IMPRESSION: Unchanged CHF.    Intubated 7/19/2018  Extubated 8/8/2018 on 7/19/2018 ICU was c/s, on exam pt was found to be on BiPAP with increased WOB, tachypnea, and SOB.  Pt was subsequently intubated by critical care team and intensivist, a RIJ TLC was placed for central venous access, placed on pressors in the setting of sepsis/septic shock vs, HRS.  Pt also noted to be coagulopathic likely in the setting of liver failure with INR of 2.9 on uptrend to 3.9. Pt received 2 amps of bicarb and was placed on a Bicarb gtt in the setting of severe HAGMA,  CT head negative for acute pathology. CT chest/Abdnoted to have: "Anasarca, mild bilateral pleural effusions. Small right pericardial effusion, small densities in the right mainstem bronchus may represent mucous material. Mild hazy ground glass densities in both lungs may represent pulmonary edema or pneumonia. The gallbladder appears contracted. Pericholecystic fluid versus gallbladder wall edema. Mild to moderate ascites in the abdomen and pelvis. Staghorn calculus in the left kidney. 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 Nonspecific gallbladder wall thickening. Left nephrolithiasis. Pt denies h/o ETOH, Tylenol OD, hepatitis, malignancy.  Pt admitted to the ICU now intubated on mechanical ventilation support for increased WOB and tachypnea likely 2/2 to respiratory compensation in the setting of severe HAGMA with LA of 12.2 and HCO3 of 7 now on Bicarb gtt, hypotension with sepsis/septic shock ?2/2 to nephrolithiasis with staghorn  vs. ?HRS vs. CRS, SHARYN likely in the setting of ischemic atn, fulminant liver failure.        Xray Chest 8/7/2018 IMPRESSION: Unchanged CHF.    Intubated 7/19/2018  Extubated 8/8/2018

## 2018-08-09 NOTE — OCCUPATIONAL THERAPY INITIAL EVALUATION ADULT - PERTINENT HX OF CURRENT PROBLEM, REHAB EVAL
Patient admitted to [facility] due to AFIB with RVR with juandice and edema. Patient had x-ray of chest on 8/7/18 [facility] revealed unchanged CHF
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
## 2018-08-09 NOTE — SWALLOW BEDSIDE ASSESSMENT ADULT - SLP GENERAL OBSERVATIONS
pt seen bedside, alert and oriented x4.  She responded to simple autobiographical/want questions, verbalize needs and was able to follow one step directions.  Noted good speech intelligibility.

## 2018-08-09 NOTE — OCCUPATIONAL THERAPY INITIAL EVALUATION ADULT - TRANSFER SAFETY CONCERNS NOTED: SIT/STAND, REHAB EVAL
decreased step length/decreased balance during turns/inability to maintain weight-bearing restrictions w/o assist/decreased weight-shifting ability/decreased sequencing ability

## 2018-08-09 NOTE — SWALLOW BEDSIDE ASSESSMENT ADULT - SWALLOW EVAL: DIAGNOSIS
pt presented with oropharyngeal dysphagia marked by reduced mastication, decreased A-P transport of bolus causing mild lingual stasis with hard solids and reduced laryngeal elevation.  No overt signs of aspiration noted.

## 2018-08-09 NOTE — SWALLOW BEDSIDE ASSESSMENT ADULT - ASR SWALLOW DENTITION
incomplete/edentulous, does not have dentures incomplete/Note 1 or 2 natural teeth. edentulous, pt reported she does not have dentures
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
## 2018-08-09 NOTE — PROGRESS NOTE ADULT - SUBJECTIVE AND OBJECTIVE BOX
TMAX - 98.7    On day # 22 Meropenem / # 22 Vanco / # 19 Diflucan    Vital Signs Last 24 Hrs  T(C): 36.6 (09 Aug 2018 15:17), Max: 36.9 (09 Aug 2018 07:23)  T(F): 97.9 (09 Aug 2018 15:17), Max: 98.5 (09 Aug 2018 07:23)  HR: 82 (09 Aug 2018 15:00) (50 - 119)  BP: 116/77 (09 Aug 2018 15:00) (83/59 - 133/107)  BP(mean): 87 (09 Aug 2018 15:00) (53 - 96)  RR: 14 (09 Aug 2018 15:00) (11 - 31)  SpO2: 100% (09 Aug 2018 15:00) (55% - 100%)  Supplemental O2:  on NC O2 now      Awake, alert, sitting up in the chair, claims to be feeling better.  No c/o cp, no SOB, and no c/o back pain now.  Tolerating a po diet.  Patient to be transferred out of the ICU today to a non-monitored bed.      PHYSICAL EXAM  General:  awake, alert, sitting up in the chair now, in NAD  HEENT:  conj pink, sclerae anicteric, PERRLA, no oral lesions noted  Neck:  supple, no nodes noted            Rt. EJ IV remains in place  Heart:  RR  Lungs:  few moist rhonchi scattered at the bases  Abdomen:  BS+, soft, nontender to palpation  No CVA or Spinal tenderness to palptatiobn  Extremities:  2+ edema LE's                      decreased edema of the arms and hands now                     chronic skin changes of both LE's                     LUE with Midline in place - site clean and dressing dry and intact - placed 7/24  Skin:  warm, dry, no rash noted      I&O's Summary :    08 Aug 2018 07:01  -  09 Aug 2018 07:00  --------------------------------------------------------  IN: 640 mL / OUT: 1660 mL / NET: -1020 mL    09 Aug 2018 07:01  -  09 Aug 2018 15:26  --------------------------------------------------------  IN: 140 mL / OUT: 0 mL / NET: 140 mL        LABS:  CBC Full  -  ( 09 Aug 2018 03:42 )  WBC Count : 7.82 K/uL  Hemoglobin : 8.9 g/dL  Hematocrit : 29.0 %  Platelet Count - Automated : 274 K/uL  Mean Cell Volume : 95.4 fl  Mean Cell Hemoglobin : 29.3 pg  Mean Cell Hemoglobin Concentration : 30.7 gm/dL  Auto Neutrophil # : x  Auto Lymphocyte # : x  Auto Monocyte # : x  Auto Eosinophil # : x  Auto Basophil # : x  Auto Neutrophil % : x  Auto Lymphocyte % : x  Auto Monocyte % : x  Auto Eosinophil % : x  Auto Basophil % : x    08-09    142  |  101  |  15  ----------------------------<  80  3.8   |  30  |  0.65    Ca    8.1<L>      09 Aug 2018 03:42  Phos  3.2     08-09  Mg     2.0     08-09      Sedimentation Rate, Erythrocyte: 49 mm/hr (08-08 @ 03:52)          MICROBIOLOGY:  Specimen Source: .Blood Blood (08-02 @ 23:38)  Culture Results:   No growth at 5 days. (08-02 @ 23:38)    Specimen Source: .Blood Blood (08-02 @ 17:50)  Culture Results:   No growth at 5 days. 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  Now extubated again and patient remains afebrile and WBC's are WNL and ESR has decreased.    Suggestions:  Will try d/c ab rx today as temps remain decreased and patient has completed > 21 days of rx for her Sepsis and repeat Cx's have remained negative.  Discussed with Dr. Frances re: further urologic intervention - patient will ultimately require removal of the Staghorn Calculus and as long as she has improved it is preferable to try to salvage her lt. kidney.  Will therefore observe closely off ab rx and continue to follow-up temps and labs. TMAX - 98.7    On day # 22 Meropenem / # 22 Vanco / # 19 Diflucan    Vital Signs Last 24 Hrs  T(C): 36.6 (09 Aug 2018 15:17), Max: 36.9 (09 Aug 2018 07:23)  T(F): 97.9 (09 Aug 2018 15:17), Max: 98.5 (09 Aug 2018 07:23)  HR: 82 (09 Aug 2018 15:00) (50 - 119)  BP: 116/77 (09 Aug 2018 15:00) (83/59 - 133/107)  BP(mean): 87 (09 Aug 2018 15:00) (53 - 96)  RR: 14 (09 Aug 2018 15:00) (11 - 31)  SpO2: 100% (09 Aug 2018 15:00) (55% - 100%)  Supplemental O2:  on NC O2 now      Awake, alert, sitting up in the chair, claims to be feeling better.  No c/o cp, no SOB, and no c/o back pain now.  Tolerating a po diet.  Patient to be transferred out of the ICU today to a non-monitored bed.      PHYSICAL EXAM  General:  awake, alert, sitting up in the chair now, in NAD  HEENT:  conj pink, sclerae anicteric, PERRLA, no oral lesions noted  Neck:  supple, no nodes noted            Rt. EJ IV remains in place  Heart:  RR  Lungs:  few moist rhonchi scattered at the bases  Abdomen:  BS+, soft, nontender to palpation  No CVA or Spinal tenderness to palptatiobn  Extremities:  2+ edema LE's                      decreased edema of the arms and hands now                     chronic skin changes of both LE's                     LUE with Midline in place - site clean and dressing dry and intact - placed 7/24  Skin:  warm, dry, no rash noted      I&O's Summary :    08 Aug 2018 07:01  -  09 Aug 2018 07:00  --------------------------------------------------------  IN: 640 mL / OUT: 1660 mL / NET: -1020 mL    09 Aug 2018 07:01  -  09 Aug 2018 15:26  --------------------------------------------------------  IN: 140 mL / OUT: 0 mL / NET: 140 mL        LABS:  CBC Full  -  ( 09 Aug 2018 03:42 )  WBC Count : 7.82 K/uL  Hemoglobin : 8.9 g/dL  Hematocrit : 29.0 %  Platelet Count - Automated : 274 K/uL  Mean Cell Volume : 95.4 fl  Mean Cell Hemoglobin : 29.3 pg  Mean Cell Hemoglobin Concentration : 30.7 gm/dL  Auto Neutrophil # : x  Auto Lymphocyte # : x  Auto Monocyte # : x  Auto Eosinophil # : x  Auto Basophil # : x  Auto Neutrophil % : x  Auto Lymphocyte % : x  Auto Monocyte % : x  Auto Eosinophil % : x  Auto Basophil % : x    08-09    142  |  101  |  15  ----------------------------<  80  3.8   |  30  |  0.65    Ca    8.1<L>      09 Aug 2018 03:42  Phos  3.2     08-09  Mg     2.0     08-09      Sedimentation Rate, Erythrocyte: 49 mm/hr (08-08 @ 03:52)          MICROBIOLOGY:  Specimen Source: .Blood Blood (08-02 @ 23:38)  Culture Results:   No growth at 5 days. (08-02 @ 23:38)    Specimen Source: .Blood Blood (08-02 @ 17:50)  Culture Results:   No growth at 5 days. 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  Now extubated again and patient remains afebrile and WBC's are WNL and ESR has decreased.    Suggestions:  Will try d/c ab rx today but continue Diflucan for a few more days of rx, as temps remain decreased and patient has completed > 21 days of rx for her Sepsis and repeat Cx's have remained negative.  Discussed with Dr. Frances re: further urologic intervention - patient will ultimately require removal of the Staghorn Calculus and as long as she has improved it is preferable to try to salvage her lt. kidney.  Will therefore observe closely off ab rx and continue to follow-up temps and labs.

## 2018-08-09 NOTE — PROGRESS NOTE ADULT - SUBJECTIVE AND OBJECTIVE BOX
Patient seen and examined bedside resting comfortably in ICU. Extubated yesterday.   No acute overnight events.  Patient complains of gas pains and tells me she needs to "poop" with all the gluten and milk products she is getting.   Nova draining well.    T(F): 98.1 (08-09-18 @ 04:35), Max: 98.1 (08-09-18 @ 04:35)  HR: 56 (08-09-18 @ 04:00) (46 - 119)  BP: 105/54 (08-09-18 @ 04:00) (82/56 - 111/65)  RR: 22 (08-09-18 @ 04:00) (11 - 31)  SpO2: 100% (08-09-18 @ 04:00) (57% - 100%)    PHYSICAL EXAM:    General: NAD, alert and awake  HEENT: NCAT, EOMI, conjunctiva clear  Chest: nonlabored respirations, CTA b/l.  Abdomen: soft, NT/ND.   Extremities: Calf soft, nontender b/l.   : No suprapubic tenderness or bladder distention. Nova draining sadia colored urine.      LABS:                        8.9    7.82  )-----------( 274      ( 09 Aug 2018 03:42 )             29.0   08-09    142  |  101  |  15  ----------------------------<  80  3.8   |  30  |  0.65    Ca    8.1<L>      09 Aug 2018 03:42  Phos  3.2     08-09  Mg     2.0     08-09      I&O's Detail    07 Aug 2018 07:01  -  08 Aug 2018 07:00  --------------------------------------------------------  IN:    dexmedetomidine Infusion: 46 mL    ns in tub fed  [id number]: 500 mL    Solution: 100 mL    Solution: 500 mL    Solution: 100 mL  Total IN: 1246 mL    OUT:    Indwelling Catheter - Urethral: 2480 mL  Total OUT: 2480 mL    Total NET: -1234 mL      08 Aug 2018 07:01  -  09 Aug 2018 05:44  --------------------------------------------------------  IN:    Oral Fluid: 240 mL    Solution: 150 mL    Solution: 250 mL  Total IN: 640 mL    OUT:    Indwelling Catheter - Urethral: 1160 mL  Total OUT: 1160 mL    Total NET: -520 mL    Impression: 54F admitted with sepsis 2/2 large calculus in left kidney, in ICU for recurrent resp failure, hemodynamically stable s/p extubation yesterday.     Plan:  Continue nova, monitor urine output  f/u labs, Trend renal function  continue ABX per ID  Continue primary care per ICU  will likely need urologic intervention once medically stable  To be further discussed with DR. GARCIA
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
## 2018-08-09 NOTE — OCCUPATIONAL THERAPY INITIAL EVALUATION ADULT - ADDITIONAL COMMENTS
[place] reported to OT that no history is provided for patient and family has not contacted social work or case management about PLOF or history. As per patient, patient lives in a private house with 6 steps to enter with handrails on both sides. Once inside, the patient bedroom and bathroom is on the main floor. The patients bathroom has a tub/shower combination with a regular toilet and no DME inside. The patient ambulates with no devices and does not own any devices for ambulation. The patient is L handed, does not wear glasses (Legally blind) and does not drive.

## 2018-08-09 NOTE — OCCUPATIONAL THERAPY INITIAL EVALUATION ADULT - GENERAL OBSERVATIONS, REHAB EVAL
Pt was encountered OOB in chair; NAD, + telemetry, BP cuff RUE, continuous pulse ox, nova, O2 via nasal cannula at 3L/min, AXOX3, agitated at times, anxious, cooperative with cueing, followed 1 step commands.

## 2018-08-09 NOTE — CHART NOTE - NSCHARTNOTEFT_GEN_A_CORE
Assessment: pt extubated 8/08. swallow eval 8/09 recommend mechanical soft, thin liquids.     Factors impacting intake: [ ] none [ ] nausea  [ ] vomiting [ ] diarrhea [ ] constipation  [ ]chewing problems [ ] swallowing issues  [ ] other:     Diet Presciption: Diet, Dysphagia 2 Mechanical Soft-Thin Liquids:   Gluten-Gliadin Restricted (08-09-18 @ 10:03)    Intake:     Current Weight: 76.8 kg (8/08)  % Weight Change: lost 18.7 kg x 2.5 weeks- 19.6% (accuracy??)  edema 3+ generalized     Pertinent Medications: MEDICATIONS  (STANDING):  aspirin  chewable 81 milliGRAM(s) Oral daily  chlorhexidine 4% Liquid 1 Application(s) Topical <User Schedule>  fluconAZOLE   Tablet 200 milliGRAM(s) Oral daily  furosemide   Injectable 40 milliGRAM(s) IV Push every 12 hours  heparin  Injectable 5000 Unit(s) SubCutaneous every 8 hours  meropenem  IVPB 1000 milliGRAM(s) IV Intermittent every 8 hours  meropenem  IVPB      midodrine 20 milliGRAM(s) Oral every 8 hours  simethicone drops 80 milliGRAM(s) Oral daily  vancomycin  IVPB      vancomycin  IVPB 750 milliGRAM(s) IV Intermittent every 12 hours    MEDICATIONS  (PRN):    Pertinent Labs: 08-09 Na142 mmol/L Glu 80 mg/dL K+ 3.8 mmol/L Cr  0.65 mg/dL BUN 15 mg/dL 08-09 Phos 3.2 mg/dL 08-06 Alb 2.2 g/dL<L> 07-20 [id number] 5.0 %     CAPILLARY BLOOD GLUCOSE        Skin: sacrum stage II    Estimated Needs:   [x ] no change since previous assessment (7/20)  [ ] recalculated:     Previous Nutrition Diagnosis:   [x ] Inadequate Energy Intake [ ]Inadequate Oral Intake [ ] Excessive Energy Intake   [ ] Underweight [ ] Increased Nutrient Needs [ ] Overweight/Obesity   [ ] Altered GI Function [ ] Unintended Weight Loss [ ] Food & Nutrition Related Knowledge Deficit [ ] Malnutrition     Nutrition Diagnosis is [ ] ongoing  [ ] resolved [ ] not applicable   previous goal: pt to meet >75% energy and protein via TF - not applicable    New Nutrition Diagnosis: [ ] not applicable       Interventions:   Recommend  [ ] Change Diet To:  [ ] Nutrition Supplement  [ ] Nutrition Support  [ ] Other:     Monitoring and Evaluation:   [x ] PO intake [ x ] Tolerance to diet prescription [ x ] weights [ x ] labs[ x ] follow up per protocol  [ ] other: Assessment: pt extubated 8/08. swallow eval 8/09 recommend mechanical soft, thin liquids.     Factors impacting intake: [ ] none [ ] nausea  [ ] vomiting [ ] diarrhea [ ] constipation  [ ]chewing problems [ ] swallowing issues  [ ] other:     Diet Presciption: Diet, Dysphagia 2 Mechanical Soft-Thin Liquids:   Gluten-Gliadin Restricted (08-09-18 @ 10:03)    Intake: pt observed eating lunch. reports good appetite. denies N/V. reports she barely has any teeth; [facility]     Current Weight: 76.8 kg (8/08)  % Weight Change: lost 18.7 kg x 2.5 weeks- 19.6% (accuracy??)  edema 3+ generalized     Nutrition focused physical exam conducted; Subcutaneous fat loss: [WNL] Orbital fat pads region, [edentulous ]Buccal fat region, [mild ]Triceps region,  [WNL ]Ribs region.  Muscle wasting: [moderate ]Temples region, [moderate ]Clavicle region, [mild ]Shoulder region, [moderate]Scapula region, [WNL ]Interosseous region,  [WNL ]thigh region, [WNL ]Calf region    Pertinent Medications: MEDICATIONS  (STANDING):  aspirin  chewable 81 milliGRAM(s) Oral daily  chlorhexidine 4% Liquid 1 Application(s) Topical <User Schedule>  fluconAZOLE   Tablet 200 milliGRAM(s) Oral daily  furosemide   Injectable 40 milliGRAM(s) IV Push every 12 hours  heparin  Injectable 5000 Unit(s) SubCutaneous every 8 hours  meropenem  IVPB 1000 milliGRAM(s) IV Intermittent every 8 hours  meropenem  IVPB      midodrine 20 milliGRAM(s) Oral every 8 hours  simethicone drops 80 milliGRAM(s) Oral daily  vancomycin  IVPB      vancomycin  IVPB 750 milliGRAM(s) IV Intermittent every 12 hours    MEDICATIONS  (PRN):    Pertinent Labs: 08-09 Na142 mmol/L Glu 80 mg/dL K+ 3.8 mmol/L Cr  0.65 mg/dL BUN 15 mg/dL 08-09 Phos 3.2 mg/dL 08-06 Alb 2.2 g/dL<L> 07-20 [id number] 5.0 %     CAPILLARY BLOOD GLUCOSE        Skin: sacrum stage II    Estimated Needs:   [x ] no change since previous assessment (7/20)  [ ] recalculated:     Previous Nutrition Diagnosis:   [x ] Inadequate Energy Intake [ ]Inadequate Oral Intake [ ] Excessive Energy Intake   [ ] Underweight [ ] Increased Nutrient Needs [ ] Overweight/Obesity   [ ] Altered GI Function [ ] Unintended Weight Loss [ ] Food & Nutrition Related Knowledge Deficit [ ] Malnutrition     Nutrition Diagnosis is [ ] ongoing  [ ] resolved [ ] not applicable   previous goal: pt to meet >75% energy and protein via TF - not applicable    New Nutrition Diagnosis: [ ] not applicable       Interventions:   Recommend  [ ] Change Diet To:  [ ] Nutrition Supplement  [ ] Nutrition Support  [ ] Other:     Monitoring and Evaluation:   [x ] PO intake [ x ] Tolerance to diet prescription [ x ] weights [ x ] labs[ x ] follow up per protocol  [ ] other: Assessment: pt extubated 8/08. swallow eval 8/09 recommend mechanical soft, thin liquids.     Factors impacting intake: [x ] none [ ] nausea  [ ] vomiting [ ] diarrhea [ ] constipation  [ ]chewing problems [ ] swallowing issues  [ ] other:     Diet Presciption: Diet, Dysphagia 2 Mechanical Soft-Thin Liquids:   Gluten-Gliadin Restricted (08-09-18 @ 10:03)    Intake: pt observed eating lunch. reports good appetite. denies N/V. reports she barely has any teeth; [facility]. keeps asking if her food is gluten free.    Current Weight: 76.8 kg (8/08)  % Weight Change: lost 18.7 kg x 2.5 weeks- 19.6% (questionable accuracy?? lost 9# in one day on flowsheet)  edema 3+ generalized     Nutrition focused physical exam conducted; Subcutaneous fat loss: [WNL] Orbital fat pads region, [edentulous ]Buccal fat region, [mild ]Triceps region,  [WNL ]Ribs region.  Muscle wasting: [moderate ]Temples region, [moderate ]Clavicle region, [mild ]Shoulder region, [moderate]Scapula region, [WNL ]Interosseous region,  [WNL ]thigh region, [WNL ]Calf region    Pertinent Medications: MEDICATIONS  (STANDING):  aspirin  chewable 81 milliGRAM(s) Oral daily  chlorhexidine 4% Liquid 1 Application(s) Topical <User Schedule>  fluconAZOLE   Tablet 200 milliGRAM(s) Oral daily  furosemide   Injectable 40 milliGRAM(s) IV Push every 12 hours  heparin  Injectable 5000 Unit(s) SubCutaneous every 8 hours  meropenem  IVPB 1000 milliGRAM(s) IV Intermittent every 8 hours  meropenem  IVPB      midodrine 20 milliGRAM(s) Oral every 8 hours  simethicone drops 80 milliGRAM(s) Oral daily  vancomycin  IVPB      vancomycin  IVPB 750 milliGRAM(s) IV Intermittent every 12 hours    MEDICATIONS  (PRN):    Pertinent Labs: 08-09 Na142 mmol/L Glu 80 mg/dL K+ 3.8 mmol/L Cr  0.65 mg/dL BUN 15 mg/dL 08-09 Phos 3.2 mg/dL 08-06 Alb 2.2 g/dL<L> 07-20 [id number] 5.0 %     CAPILLARY BLOOD GLUCOSE        Skin: sacrum stage II    Estimated Needs:   [x ] no change since previous assessment (7/20)  [ ] recalculated:     Previous Nutrition Diagnosis:   [x ] Inadequate Energy Intake [ ]Inadequate Oral Intake [ ] Excessive Energy Intake   [ ] Underweight [ ] Increased Nutrient Needs [ ] Overweight/Obesity   [ ] Altered GI Function [ ] Unintended Weight Loss [ ] Food & Nutrition Related Knowledge Deficit [ ] Malnutrition     Nutrition Diagnosis is [ ] ongoing  [x ] resolved [ ] not applicable   previous goal: pt to meet >75% energy and protein via TF - not applicable    New Nutrition Diagnosis: [x ] not applicable       Interventions:   Recommend  [ ] Change Diet To:  [ ] Nutrition Supplement  [ ] Nutrition Support  [x ] Other: continue current nutrition plan of care; continue to obtain food preferences and offer alternate menu items       Monitoring and Evaluation:   [x ] PO intake [ x ] Tolerance to diet prescription [ x ] weights [ x ] labs[ x ] follow up per protocol  [ ] other:

## 2018-08-09 NOTE — PROGRESS NOTE ADULT - ASSESSMENT
55 y/o F initially admitted for septic shock secondary to emphysematous pyelonephritis c/b acute respiratory failure, extubated multiple times with multiple reintubations, now extubated yesterday and doing well.    - Continue diuresis, goal still net negative 1-2L  - Appreciate ID assistance, continue antibiotics.  - Appreciate urology assistance, possible future intervention  - Continue midodrine, wean as tolerated  - Speech and swallow eval  - Heparin SC  - List to floor

## 2018-08-09 NOTE — SWALLOW BEDSIDE ASSESSMENT ADULT - SWALLOW EVAL: RECOMMENDED FEEDING/EATING TECHNIQUES
maintain upright posture during/after eating for 30 mins/check mouth frequently for oral residue/pocketing/alternate food with liquid

## 2018-08-10 DIAGNOSIS — K72.00 ACUTE AND SUBACUTE HEPATIC FAILURE WITHOUT COMA: ICD-10-CM

## 2018-08-10 DIAGNOSIS — I50.41 ACUTE COMBINED SYSTOLIC (CONGESTIVE) AND DIASTOLIC (CONGESTIVE) HEART FAILURE: ICD-10-CM

## 2018-08-10 DIAGNOSIS — N20.0 CALCULUS OF KIDNEY: ICD-10-CM

## 2018-08-10 DIAGNOSIS — E87.6 HYPOKALEMIA: ICD-10-CM

## 2018-08-10 DIAGNOSIS — M79.89 OTHER SPECIFIED SOFT TISSUE DISORDERS: ICD-10-CM

## 2018-08-10 DIAGNOSIS — A41.9 SEPSIS, UNSPECIFIED ORGANISM: ICD-10-CM

## 2018-08-10 LAB
ANION GAP SERPL CALC-SCNC: 8 MMOL/L — SIGNIFICANT CHANGE UP (ref 5–17)
BUN SERPL-MCNC: 13 MG/DL — SIGNIFICANT CHANGE UP (ref 7–23)
CALCIUM SERPL-MCNC: 8.3 MG/DL — LOW (ref 8.5–10.1)
CHLORIDE SERPL-SCNC: 103 MMOL/L — SIGNIFICANT CHANGE UP (ref 96–108)
CO2 SERPL-SCNC: 33 MMOL/L — HIGH (ref 22–31)
CREAT SERPL-MCNC: 0.7 MG/DL — SIGNIFICANT CHANGE UP (ref 0.5–1.3)
GLUCOSE SERPL-MCNC: 116 MG/DL — HIGH (ref 70–99)
HCT VFR BLD CALC: 31 % — LOW (ref 34.5–45)
HGB BLD-MCNC: 9.4 G/DL — LOW (ref 11.5–15.5)
MAGNESIUM SERPL-MCNC: 1.9 MG/DL — SIGNIFICANT CHANGE UP (ref 1.6–2.6)
MCHC RBC-ENTMCNC: 28.7 PG — SIGNIFICANT CHANGE UP (ref 27–34)
MCHC RBC-ENTMCNC: 30.3 GM/DL — LOW (ref 32–36)
MCV RBC AUTO: 94.5 FL — SIGNIFICANT CHANGE UP (ref 80–100)
NRBC # BLD: 0 /100 WBCS — SIGNIFICANT CHANGE UP (ref 0–0)
PHOSPHATE SERPL-MCNC: 2.2 MG/DL — LOW (ref 2.5–4.5)
PLATELET # BLD AUTO: 287 K/UL — SIGNIFICANT CHANGE UP (ref 150–400)
POTASSIUM SERPL-MCNC: 3.3 MMOL/L — LOW (ref 3.5–5.3)
POTASSIUM SERPL-SCNC: 3.3 MMOL/L — LOW (ref 3.5–5.3)
RBC # BLD: 3.28 M/UL — LOW (ref 3.8–5.2)
RBC # FLD: 20.1 % — HIGH (ref 10.3–14.5)
SODIUM SERPL-SCNC: 144 MMOL/L — SIGNIFICANT CHANGE UP (ref 135–145)
WBC # BLD: 6.2 K/UL — SIGNIFICANT CHANGE UP (ref 3.8–10.5)
WBC # FLD AUTO: 6.2 K/UL — SIGNIFICANT CHANGE UP (ref 3.8–10.5)

## 2018-08-10 PROCEDURE — 99233 SBSQ HOSP IP/OBS HIGH 50: CPT

## 2018-08-10 RX ADMIN — MIDODRINE HYDROCHLORIDE 20 MILLIGRAM(S): 2.5 TABLET ORAL at 06:22

## 2018-08-10 RX ADMIN — FLUCONAZOLE 200 MILLIGRAM(S): 150 TABLET ORAL at 11:52

## 2018-08-10 RX ADMIN — Medication 40 MILLIGRAM(S): at 06:21

## 2018-08-10 RX ADMIN — Medication 40 MILLIGRAM(S): at 18:19

## 2018-08-10 RX ADMIN — MIDODRINE HYDROCHLORIDE 20 MILLIGRAM(S): 2.5 TABLET ORAL at 14:39

## 2018-08-10 RX ADMIN — Medication 81 MILLIGRAM(S): at 13:52

## 2018-08-10 RX ADMIN — SIMETHICONE 80 MILLIGRAM(S): 80 TABLET, CHEWABLE ORAL at 13:50

## 2018-08-10 RX ADMIN — MIDODRINE HYDROCHLORIDE 20 MILLIGRAM(S): 2.5 TABLET ORAL at 21:51

## 2018-08-10 NOTE — PROGRESS NOTE ADULT - SUBJECTIVE AND OBJECTIVE BOX
Patient seen and examined bedside resting comfortably.  No complaints offered.   Denies nausea and vomiting. Tolerating diet.  Flatus/BM. +  Denies chest pain, dyspnea, cough.  T(F): 98.2 (08-10-18 @ 05:16), Max: 98.5 (08-09-18 @ 11:57)  HR: 71 (08-10-18 @ 10:09) (66 - 96)  BP: 109/64 (08-10-18 @ 05:16) (109/48 - 149/77)  RR: 18 (08-10-18 @ 05:16) (14 - 28)  SpO2: 99% (08-10-18 @ 10:09) (94% - 100%)    PHYSICAL EXAM:  General: NAD, WDWN  Neuro:  Alert & oriented x 3  Abdomen: soft, NTND. Normactive BS      LABS:                        9.4    6.20  )-----------( 287      ( 10 Aug 2018 10:42 )             31.0     08-10    144  |  103  |  13  ----------------------------<  116<H>  3.3<L>   |  33<H>  |  0.70    Ca    8.3<L>      10 Aug 2018 10:42  Phos  2.2     08-10  Mg     1.9     08-10        I&O's Detail    09 Aug 2018 07:01  -  10 Aug 2018 07:00  --------------------------------------------------------  IN:    Oral Fluid: 140 mL  Total IN: 140 mL    OUT:    Rectal Tube: 1 mL    Voided: 600 mL  Total OUT: 601 mL    Total NET: -461 mL        Impression: 54y Female admitted with ATRIAL FIBRILLATION WITH RVR, JAUNDICE, EDEMA  TACHYCARDIA  staghorn calculus    Plan:  - continue IVAB per ID  - continue medical f/u  - eventual nephrosolithotripsy when medically stable.

## 2018-08-10 NOTE — CONSULT NOTE ADULT - CONSULT REASON
Abnormal LFTs
S/P Respiratory failure.
SHARYN, metabolic acidosis
Sepsis with Shock/ Respiratory failure
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
right sided heart failure

## 2018-08-10 NOTE — CONSULT NOTE ADULT - SUBJECTIVE AND OBJECTIVE BOX
Patient is a 54y old  Female who presents with a chief complaint of Sepsis/septic shock, ?HRS, CRS, Liver failure, severe Acidosis.    HPI: 54 year female with no significant PMH, poor historian, brought to ED with weakness. Found to be in rapid A Fib(requiring cardioversion in ED), Hypoglycemic, Severe metabolic acidosis, High LFTs, Coagulopathy. Further more was hypotensive and in Respiratory distress. Required intubation, admitted to ICU with Vent and pressor support.  Had prolonged hospital course with Respiratory failure and septic shock. Initially failed weaning attempts but eventually got extubated and transferred out of ICU.  Ex smoker.  Urine culture grew E Coli, Klebsiella, Coagulase Negative Staph and Enterococcus(not a contaminant per ID).     PAST MEDICAL & SURGICAL HISTORY: non     FAMILY HISTORY:  not able to provide.    SOCIAL HISTORY: ex smoker.    Allergies  penicillin (Unknown)    MEDICATIONS  (STANDING):  aspirin  chewable 81 milliGRAM(s) Oral daily  fluconAZOLE   Tablet 200 milliGRAM(s) Oral daily  furosemide   Injectable 40 milliGRAM(s) IV Push every 12 hours  midodrine 20 milliGRAM(s) Oral every 8 hours  simethicone 80 milliGRAM(s) Chew daily    REVIEW OF SYSTEMS:  not able to provide.    Vital Signs Last 24 Hrs  T(C): 36.6 (10 Aug 2018 11:30), Max: 36.8 (10 Aug 2018 00:08)  T(F): 97.9 (10 Aug 2018 11:30), Max: 98.3 (10 Aug 2018 00:08)  HR: 86 (10 Aug 2018 15:10) (66 - 96)  BP: 137/74 (10 Aug 2018 15:10) (109/64 - 149/77)  BP(mean): 98 (09 Aug 2018 16:00) (98 - 98)  RR: 16 (10 Aug 2018 15:10) (16 - 20)  SpO2: 95% (10 Aug 2018 15:10) (94% - 100%)    PHYSICAL EXAM:  GEN:         Awake, responsive and comfortable.  HEENT:    Normal.    RESP:       no wheezing.  CVS:             Regular rate and rhythm.   ABD:         Soft, non-tender, non-distended;   :             No costovertebral angle tenderness  SKIN:           Warm and dry.  EXTR:            No clubbing, cyanosis or edema  CNS:              Intact sensory and motor function.  PSYCH:        cooperative, no anxiety or depression    LABS:                        9.4    6.20  )-----------( 287      ( 10 Aug 2018 10:42 )             31.0     08-10    144  |  103  |  13  ----------------------------<  116<H>  3.3<L>   |  33<H>  |  0.70    Ca    8.3<L>      10 Aug 2018 10:42  Phos  2.2     08-10  Mg     1.9     08-10    Culture - Blood (collected 08-02-18 @ 23:38)  Source: .Blood Blood  Final Report (08-08-18 @ 01:00):    No growth at 5 days.    Culture - Blood (collected 08-02-18 @ 17:50)  Source: .Blood Blood  Final Report (08-07-18 @ 18:00):    No growth at 5 days.    Culture - Sputum (collected 08-02-18 @ 12:43)  Source: .Sputum Sputum trap  Gram Stain (08-04-18 @ 08:44):    Rare polymorphonuclear leukocytes per low power field    Rare Squamous epithelial cells per low power field    No organisms seen per oil power field  Final Report (08-04-18 @ 08:44):    No growth    RADIOLOGY & ADDITIONAL STUDIES:  < from: Xray Chest 1 View AP/PA. (08.07.18 @ 07:24) >    EXAM:  XR CHEST AP OR PA 1V                          PROCEDURE DATE:  08/07/2018      INTERPRETATION:  AP semierect chest on August 7, 2018 at 6:29 AM. Patient   clinically has pulmonary edema.    Heart is enlarged. Endotracheal tube and nasogastric tube remain.    Diffuse mild infiltrates are again seen in the lungs likely with some   left base fluid consistent with CHF.    Chest is similar to August 6.    IMPRESSION: Unchanged CHF.    GEORGE RODGERS M.D., ATTENDING RADIOLOGIST  This document has been electronically signed. Aug  7 2018 10:50AM    < from: TTE Limited Echo w/o Cont (08.01.18 @ 20:03) >   EXAM:  TTE LTD WO CON      PROCEDURE DATE:  08/01/2018      INTERPRETATION:  REPORT:    TRANSTHORACIC ECHOCARDIOGRAM REPORT    Patient Name:   KAVEH GIRALDO Patient Location: [facility] Rec #:  [id number]      Accession #:      [id number]  Account #:                      Height:           65.0 in 165.0 cm  YOB: 1963       Weight:           197.3 lb 89.50 kg  Patient Age:    54 years        BSA:              1.97 m²  Patient Gender: F               BP:               176/101 mmHg    Date of Exam: 8/1/2018 8:03:58 PM  Sonographer:  [email or web address]    Procedure:     Follow up or Limited Echocardiogram.  Indications:   Unspecified atrial fibrillation - I48.91  Diagnosis:     Unspecified atrial fibrillation - I48.91  Study Details: Technically limited study.    SPECTRAL DOPPLER ANALYSIS (where applicable):     PHYSICIAN INTERPRETATION:  Left Ventricle: The left ventricular internal cavity size is normal.  Global LV systolic function was mildly to moderately decreased. Left   ventricular ejection fraction, by visual estimation, is 40 to 45%. The   left ventricular diastolic function could not be assessed in this study.  Findings are consistent with global cardiomyopathy.  Right Ventricle: The right ventricular size is mildly enlarged. RV   systolic function is mildly reduced.  Left Atrium: Mildly enlarged left atrium.  Right Atrium: Mildly enlarged right atrium.  Pericardium: A small pericardial effusion is present. The pericardial   effusion is posterior to the left ventricle. There is a moderate pleural   effusion in the left lateral region.  Mitral Valve: Structurally normal mitral valve, with normal leaflet   excursion. Moderate mitral valve regurgitation is seen.  Tricuspid Valve: The tricuspid valve is degenerative in appearance.   Moderate-severe tricuspid regurgitation is visualized.  Aortic Valve: The aortic valve is trileaflet. Sclerotic aortic valve with   normal opening. Trivial aortic valve regurgitation is seen.  Pulmonic Valve: The pulmonic valvewas not well visualized. Trace   pulmonic valve regurgitation.  Aorta: Aortic root measured at Sinus of Valsalva is normal.     Summary:   1. Left ventricular ejection fraction, by visual estimation, is 40 to   45%.   2. Technically limited study.   3. Mildly to moderately decreased global left ventricular systolic   function.   4. Global cardiomyopathy.   5. Normal left ventricular internal cavity size.   6. The left ventricular diastolic function could not be assessed in this   study.   7. Moderate pleural effusion in the left lateral region.   8. Small pericardial effusion.   9. Moderate mitral valve regurgitation.  10. Structurally normal mitral valve, with normal leaflet excursion.  11. Degenerative tricuspid valve.  12. Moderate-severe tricuspid regurgitation.  13. Sclerotic aortic valve with normal opening.  14. Trace pulmonic valve regurgitation.  15. Compared with the prior study from 7/19/18, global LVEF is somewhat   improved. RV appears mildly enlarged and moderate to severe tricuspid   insufficiency and moderate mitral insufficiency noted.    Sri Fletcher MD FACC, FASE, FACP  Electronically signed on 8/2/2018 at 12:29:21 PM     SRI FLETCHER   This document has been electronically signed. Aug  1 2018  8:03PM       ASSESSMENT AND PLAN:  ·	S/P Septic Shock..  ·	S/P Respiratory failure.  ·	UTI.  ·	Anemia.  ·	Hypokalemia.  ·	LV dysfunction.   ·	Valvular heart disease    Continue O2, Lasix.  Physical therapy.

## 2018-08-10 NOTE — PROGRESS NOTE ADULT - PROBLEM SELECTOR PLAN 4
continue with diuretics PRN    assuming acute on admission given her presentation and ct chest findings

## 2018-08-10 NOTE — PROGRESS NOTE ADULT - ASSESSMENT
Patient is a 54y old  Female who presents with a chief complaint of Sepsis/septic shock, ?HRS, CRS, Liver failure, severe HAGMA (19 Jul 2018 19:54) Patient is a 54y old  Female who presents with a chief complaint of Sepsis/septic shock, resp failure resolved

## 2018-08-10 NOTE — PROGRESS NOTE ADULT - SUBJECTIVE AND OBJECTIVE BOX
Patient is a 54y old  Female who presents with a chief complaint of Sepsis/septic shock, ?HRS, CRS, Liver failure, severe HAGMA (19 Jul 2018 19:54)      OVERNIGHT EVENTS: none downgraded from ICU      REVIEW OF SYSTEMS: denies chest pain/SOB, diaphoresis, no F/C, cough, dizziness, headache, blurry vision, nausea, vomiting, abdominal pain. Rest unremarkable     MEDICATIONS  (STANDING):  aspirin  chewable 81 milliGRAM(s) Oral daily  fluconAZOLE   Tablet 200 milliGRAM(s) Oral daily  furosemide   Injectable 40 milliGRAM(s) IV Push every 12 hours  heparin  Injectable 5000 Unit(s) SubCutaneous every 8 hours  midodrine 20 milliGRAM(s) Oral every 8 hours  simethicone 80 milliGRAM(s) Chew daily    MEDICATIONS  (PRN):      Allergies    penicillin (Unknown)    Intolerances    Milk (Other)      SUBJECTIVE: in bed in NAD, no acute events overnight     Vital Signs Last 24 Hrs  T(C): 36.6 (10 Aug 2018 11:30), Max: 36.8 (10 Aug 2018 00:08)  T(F): 97.9 (10 Aug 2018 11:30), Max: 98.3 (10 Aug 2018 00:08)  HR: 74 (10 Aug 2018 11:30) (66 - 96)  BP: 116/69 (10 Aug 2018 11:30) (109/48 - 149/77)  BP(mean): 98 (09 Aug 2018 16:00) (59 - 98)  RR: 20 (10 Aug 2018 11:30) (14 - 28)  SpO2: 100% (10 Aug 2018 11:30) (94% - 100%)    PHYSICAL EXAM:  GENERAL: NAD, well-groomed, well-developed, visually impaired  HEAD:  Atraumatic, Normocephalic  EYES: EOMI, PERRLA, conjunctiva and sclera clear  ENMT: No tonsillar erythema, exudates, or enlargement; Moist mucous membranes, Good dentition, No lesions  NECK: Supple, No JVD, Normal thyroid  CHEST/LUNG: Clear to  auscultation bilaterally; No rales, rhonchi, wheezing, or rubs  bilaterally  HEART: Regular rate and rhythm; No murmurs, rubs, or gallops  ABDOMEN: Soft, Nontender, Nondistended; Bowel sounds present  EXTREMITIES:  2+ Peripheral Pulses, No clubbing, cyanosis, or woody b/l lower extremities old healed ulcer on left lower extremity, hyperkeratotic toes.    left upper extremity swelling forearm, and palpable vein   SKIN: No rashes or lesions  NERVOUS SYSTEM:  Alert & Oriented X3, Good concentration; Motor Strength /5 B/L upper and lower extremities;   DTRs 2+ intact and symmetric, sensation intact BL    LABS:                        9.4    6.20  )-----------( 287      ( 10 Aug 2018 10:42 )             31.0     08-10    144  |  103  |  13  ----------------------------<  116<H>  3.3<L>   |  33<H>  |  0.70    Ca    8.3<L>      10 Aug 2018 10:42  Phos  2.2     08-10  Mg     1.9     08-10          Cultures;   CAPILLARY BLOOD GLUCOSE        Lipid panel:           RADIOLOGY & ADDITIONAL TESTS:      Imaging Personally Reviewed:  [ x] YES      Consultant(s) Notes Reviewed:  [x ] YES     Care Discussed with [x ] Consultants [X ] Patient [x ] Family  [x ]    [x ]  Other; RN

## 2018-08-10 NOTE — PROGRESS NOTE ADULT - PROBLEM SELECTOR PLAN 3
s/p shock    due to uti poa    urine cx was positive for e coli, klebsiella, enterococcus faecalis ..antibx per id    continue with antibx /antifungal  per ID recommendation

## 2018-08-11 LAB
ANION GAP SERPL CALC-SCNC: 7 MMOL/L — SIGNIFICANT CHANGE UP (ref 5–17)
BUN SERPL-MCNC: 16 MG/DL — SIGNIFICANT CHANGE UP (ref 7–23)
CALCIUM SERPL-MCNC: 8.8 MG/DL — SIGNIFICANT CHANGE UP (ref 8.5–10.1)
CHLORIDE SERPL-SCNC: 106 MMOL/L — SIGNIFICANT CHANGE UP (ref 96–108)
CO2 SERPL-SCNC: 32 MMOL/L — HIGH (ref 22–31)
CREAT SERPL-MCNC: 0.69 MG/DL — SIGNIFICANT CHANGE UP (ref 0.5–1.3)
GLUCOSE SERPL-MCNC: 104 MG/DL — HIGH (ref 70–99)
HCT VFR BLD CALC: 29.9 % — LOW (ref 34.5–45)
HGB BLD-MCNC: 9.3 G/DL — LOW (ref 11.5–15.5)
MAGNESIUM SERPL-MCNC: 1.9 MG/DL — SIGNIFICANT CHANGE UP (ref 1.6–2.6)
MCHC RBC-ENTMCNC: 29.2 PG — SIGNIFICANT CHANGE UP (ref 27–34)
MCHC RBC-ENTMCNC: 31.1 GM/DL — LOW (ref 32–36)
MCV RBC AUTO: 93.7 FL — SIGNIFICANT CHANGE UP (ref 80–100)
NRBC # BLD: 0 /100 WBCS — SIGNIFICANT CHANGE UP (ref 0–0)
PHOSPHATE SERPL-MCNC: 1.8 MG/DL — LOW (ref 2.5–4.5)
PLATELET # BLD AUTO: 286 K/UL — SIGNIFICANT CHANGE UP (ref 150–400)
POTASSIUM SERPL-MCNC: 3.8 MMOL/L — SIGNIFICANT CHANGE UP (ref 3.5–5.3)
POTASSIUM SERPL-SCNC: 3.8 MMOL/L — SIGNIFICANT CHANGE UP (ref 3.5–5.3)
RBC # BLD: 3.19 M/UL — LOW (ref 3.8–5.2)
RBC # FLD: 19.9 % — HIGH (ref 10.3–14.5)
SODIUM SERPL-SCNC: 145 MMOL/L — SIGNIFICANT CHANGE UP (ref 135–145)
WBC # BLD: 7.68 K/UL — SIGNIFICANT CHANGE UP (ref 3.8–10.5)
WBC # FLD AUTO: 7.68 K/UL — SIGNIFICANT CHANGE UP (ref 3.8–10.5)

## 2018-08-11 PROCEDURE — 93971 EXTREMITY STUDY: CPT | Mod: 26,LT

## 2018-08-11 PROCEDURE — 99233 SBSQ HOSP IP/OBS HIGH 50: CPT

## 2018-08-11 RX ORDER — POTASSIUM PHOSPHATE, MONOBASIC POTASSIUM PHOSPHATE, DIBASIC 236; 224 MG/ML; MG/ML
15 INJECTION, SOLUTION INTRAVENOUS ONCE
Qty: 0 | Refills: 0 | Status: COMPLETED | OUTPATIENT
Start: 2018-08-11 | End: 2018-08-11

## 2018-08-11 RX ORDER — FUROSEMIDE 40 MG
40 TABLET ORAL DAILY
Qty: 0 | Refills: 0 | Status: DISCONTINUED | OUTPATIENT
Start: 2018-08-11 | End: 2018-08-14

## 2018-08-11 RX ORDER — MIDODRINE HYDROCHLORIDE 2.5 MG/1
10 TABLET ORAL EVERY 8 HOURS
Qty: 0 | Refills: 0 | Status: DISCONTINUED | OUTPATIENT
Start: 2018-08-11 | End: 2018-08-13

## 2018-08-11 RX ORDER — POTASSIUM CHLORIDE 20 MEQ
40 PACKET (EA) ORAL ONCE
Qty: 0 | Refills: 0 | Status: COMPLETED | OUTPATIENT
Start: 2018-08-11 | End: 2018-08-11

## 2018-08-11 RX ADMIN — FLUCONAZOLE 200 MILLIGRAM(S): 150 TABLET ORAL at 11:21

## 2018-08-11 RX ADMIN — MIDODRINE HYDROCHLORIDE 10 MILLIGRAM(S): 2.5 TABLET ORAL at 21:34

## 2018-08-11 RX ADMIN — MIDODRINE HYDROCHLORIDE 20 MILLIGRAM(S): 2.5 TABLET ORAL at 05:15

## 2018-08-11 RX ADMIN — MIDODRINE HYDROCHLORIDE 10 MILLIGRAM(S): 2.5 TABLET ORAL at 13:10

## 2018-08-11 RX ADMIN — SIMETHICONE 80 MILLIGRAM(S): 80 TABLET, CHEWABLE ORAL at 11:20

## 2018-08-11 RX ADMIN — Medication 81 MILLIGRAM(S): at 11:21

## 2018-08-11 RX ADMIN — POTASSIUM PHOSPHATE, MONOBASIC POTASSIUM PHOSPHATE, DIBASIC 63.75 MILLIMOLE(S): 236; 224 INJECTION, SOLUTION INTRAVENOUS at 15:27

## 2018-08-11 NOTE — PROGRESS NOTE ADULT - SUBJECTIVE AND OBJECTIVE BOX
UROLOGY PROGRESS HPI:  Pt seen and examined at bedside. No complaints. Voiding with bedpan.      Vital Signs Last 24 Hrs  T(C): 36.6 (11 Aug 2018 05:29), Max: 37.2 (11 Aug 2018 00:10)  T(F): 97.8 (11 Aug 2018 05:29), Max: 98.9 (11 Aug 2018 00:10)  HR: 76 (11 Aug 2018 11:39) (76 - 89)  BP: 166/79 (11 Aug 2018 05:29) (129/85 - 166/79)  BP(mean): --  RR: 18 (11 Aug 2018 05:29) (16 - 19)  SpO2: 96% (11 Aug 2018 11:39) (91% - 100%)      PHYSICAL EXAM:    GENERAL: NAD  CHEST/LUNG: Clear to ausculation, bilaterally   HEART: RRR S1S2  ABDOMEN: NTND  : no suprapubic tenderness  EXTREMITIES:  calf soft, non tender b/l    I&O's Detail    10 Aug 2018 07:01  -  11 Aug 2018 07:00  --------------------------------------------------------  IN:    Oral Fluid: 740 mL  Total IN: 740 mL    OUT:    Voided: 601 mL  Total OUT: 601 mL    Total NET: 139 mL          LABS:                        9.3    7.68  )-----------( 286      ( 11 Aug 2018 12:38 )             29.9     08-11    145  |  106  |  16  ----------------------------<  104<H>  3.8   |  32<H>  |  0.69    Ca    8.8      11 Aug 2018 12:38  Phos  1.8     08-11  Mg     1.9     08-11        Assessment: 54F with staghorn calculus    Plan:  - continue IVAB per ID  - continue medical f/u  - eventual nephrostolithotripsy when medically stable.

## 2018-08-11 NOTE — PROGRESS NOTE ADULT - ASSESSMENT
Patient is a 54y old  Female who presents with a chief complaint of Sepsis/septic shock, resp failure resolved

## 2018-08-11 NOTE — PROGRESS NOTE ADULT - PROBLEM SELECTOR PLAN 4
continue with diuretics PRN    assuming acute on admission given her presentation and ct chest findings continue with diuretics PRN    assuming acute on admission given her presentation and ct chest findings   taper midodrine bp stable
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
## 2018-08-11 NOTE — PROGRESS NOTE ADULT - SUBJECTIVE AND OBJECTIVE BOX
INTERVAL HPI:  54 year female with no significant PMH, poor historian, brought to ED with weakness. Found to be in rapid A Fib(requiring cardioversion in ED), Hypoglycemic, Severe metabolic acidosis, High LFTs, Coagulopathy. Further more was hypotensive and in Respiratory distress. Required intubation, admitted to ICU with Vent and pressor support.  Had prolonged hospital course with Respiratory failure and septic shock. Initially failed weaning attempts but eventually got extubated and transferred out of ICU.  Ex smoker.  Urine culture grew E Coli, Klebsiella, Coagulase Negative Staph and Enterococcus(not a contaminant per ID).    OVERNIGHT EVENTS:  Comfortable in bed.    Vital Signs Last 24 Hrs  T(C): 36.6 (11 Aug 2018 05:29), Max: 37.2 (11 Aug 2018 00:10)  T(F): 97.8 (11 Aug 2018 05:29), Max: 98.9 (11 Aug 2018 00:10)  HR: 82 (11 Aug 2018 05:29) (71 - 89)  BP: 166/79 (11 Aug 2018 05:29) (116/69 - 166/79)  BP(mean): --  RR: 18 (11 Aug 2018 05:29) (16 - 20)  SpO2: 97% (11 Aug 2018 05:29) (91% - 100%)    PHYSICAL EXAM:  GEN:         Awake, responsive and comfortable.  HEENT:    Normal.    RESP:       no distress.  CVS:           Regular rate and rhythm.     MEDICATIONS  (STANDING):  aspirin  chewable 81 milliGRAM(s) Oral daily  fluconAZOLE   Tablet 200 milliGRAM(s) Oral daily  furosemide   Injectable 40 milliGRAM(s) IV Push every 12 hours  midodrine 20 milliGRAM(s) Oral every 8 hours  potassium chloride    Tablet ER 40 milliEquivalent(s) Oral once  simethicone 80 milliGRAM(s) Chew daily    LABS:                        9.4    6.20  )-----------( 287      ( 10 Aug 2018 10:42 )             31.0     08-10    144  |  103  |  13  ----------------------------<  116<H>  3.3<L>   |  33<H>  |  0.70    Ca    8.3<L>      10 Aug 2018 10:42  Phos  2.2     08-10  Mg     1.9     08-10    ASSESSMENT AND PLAN:  ·	S/P Septic Shock..  ·	S/P Respiratory failure.  ·	UTI.  ·	Anemia.  ·	Hypokalemia.  ·	Mild LV dysfunction.   ·	Valvular heart disease(MR, TR).    Reduce Lasix dose.  Follow electrolytes.  SPO2 on room air before discharge for O2 need.  Smoking Cessation, PFT out pt.

## 2018-08-11 NOTE — PROGRESS NOTE ADULT - SUBJECTIVE AND OBJECTIVE BOX
Patient is a 54y old  Female who presents with a chief complaint of Sepsis/septic shock, ?HRS, CRS, Liver failure, severe HAGMA (19 Jul 2018 19:54)      OVERNIGHT EVENTS: none     MEDICATIONS  (STANDING):  aspirin  chewable 81 milliGRAM(s) Oral daily  fluconAZOLE   Tablet 200 milliGRAM(s) Oral daily  furosemide   Injectable 40 milliGRAM(s) IV Push daily  midodrine 20 milliGRAM(s) Oral every 8 hours  simethicone 80 milliGRAM(s) Chew daily    MEDICATIONS  (PRN):    Allergies    penicillin (Unknown)    Intolerances    Milk (Other)      SUBJECTIVE: in bed in NAD, no acute events overnight     Vital Signs Last 24 Hrs  T(C): 36.6 (11 Aug 2018 05:29), Max: 37.2 (11 Aug 2018 00:10)  T(F): 97.8 (11 Aug 2018 05:29), Max: 98.9 (11 Aug 2018 00:10)  HR: 82 (11 Aug 2018 05:29) (74 - 89)  BP: 166/79 (11 Aug 2018 05:29) (116/69 - 166/79)  BP(mean): --  RR: 18 (11 Aug 2018 05:29) (16 - 20)  SpO2: 97% (11 Aug 2018 05:29) (91% - 100%)    PHYSICAL EXAM:  GENERAL: NAD, well-groomed, well-developed, visually impaired  HEAD:  Atraumatic, Normocephalic  EYES: EOMI, PERRLA, conjunctiva and sclera clear  ENMT: No tonsillar erythema, exudates, or enlargement; Moist mucous membranes, Good dentition, No lesions  NECK: Supple, No JVD, Normal thyroid  CHEST/LUNG: Clear to  auscultation bilaterally; No rales, rhonchi, wheezing, or rubs  bilaterally  HEART: Regular rate and rhythm; No murmurs, rubs, or gallops  ABDOMEN: Soft, Nontender, Nondistended; Bowel sounds present  EXTREMITIES:  2+ Peripheral Pulses, No clubbing, cyanosis, or woody b/l lower extremities old healed ulcer on left lower extremity, hyperkeratotic toes.    left upper extremity swelling forearm, and palpable vein   SKIN: No rashes or lesions  NERVOUS SYSTEM:  Alert & Oriented X3, Good concentration; Motor Strength /5 B/L upper and lower extremities;   DTRs 2+ intact and symmetric, sensation intact BL    LABS:                    refused labs today        Cultures;   CAPILLARY BLOOD GLUCOSE        Lipid panel:           RADIOLOGY & ADDITIONAL TESTS:      Imaging Personally Reviewed:  [ x] YES      Consultant(s) Notes Reviewed:  [x ] YES     Care Discussed with [x ] Consultants [X ] Patient [x ] Family  [x ]    [x ]  Other; RN

## 2018-08-12 PROCEDURE — 99233 SBSQ HOSP IP/OBS HIGH 50: CPT

## 2018-08-12 RX ADMIN — MIDODRINE HYDROCHLORIDE 10 MILLIGRAM(S): 2.5 TABLET ORAL at 22:10

## 2018-08-12 RX ADMIN — Medication 40 MILLIGRAM(S): at 05:31

## 2018-08-12 NOTE — PROGRESS NOTE ADULT - PROBLEM SELECTOR PLAN 4
continue with diuretics PRN    assuming acute on admission given her presentation and ct chest findings   taper midodrine decreased to 10 q8 from 20 q8 on 8/11/18 to bp stable

## 2018-08-12 NOTE — PROGRESS NOTE ADULT - SUBJECTIVE AND OBJECTIVE BOX
UROLOGY PROGRESS HPI:  Pt seen and examined at bedside. No complaints. Voiding with bedpan.      Vital Signs Last 24 Hrs  T(C): 36.7 (12 Aug 2018 05:00), Max: 37.3 (11 Aug 2018 13:27)  T(F): 98 (12 Aug 2018 05:00), Max: 99.1 (11 Aug 2018 13:27)  HR: 80 (12 Aug 2018 08:42) (74 - 84)  BP: 129/76 (12 Aug 2018 05:00) (109/66 - 129/76)  BP(mean): --  RR: 18 (12 Aug 2018 05:00) (16 - 18)  SpO2: 94% (12 Aug 2018 08:42) (94% - 97%)      PHYSICAL EXAM:    GENERAL: NAD  CHEST/LUNG: Clear to ausculation, bilaterally   HEART: RRR S1S2  ABDOMEN: NTND  : no suprapubic tenderness  EXTREMITIES:  calf soft, non tender b/l    I&O's Detail    11 Aug 2018 07:01  -  12 Aug 2018 07:00  --------------------------------------------------------  IN:    Oral Fluid: 240 mL  Total IN: 240 mL    OUT:  Total OUT: 0 mL    Total NET: 240 mL          LABS:                        9.3    7.68  )-----------( 286      ( 11 Aug 2018 12:38 )             29.9     08-11    145  |  106  |  16  ----------------------------<  104<H>  3.8   |  32<H>  |  0.69    Ca    8.8      11 Aug 2018 12:38  Phos  1.8     08-11  Mg     1.9     08-11        Assessment: 54F with staghorn calculus    Plan:  - continue IVAB per ID  - continue medical f/u  - eventual nephrostolithotripsy when medically stable.

## 2018-08-12 NOTE — PROGRESS NOTE ADULT - SUBJECTIVE AND OBJECTIVE BOX
Patient is a 54y old  Female who presents with a chief complaint of Sepsis/septic shock, ?HRS, CRS, Liver failure, severe HAGMA (19 Jul 2018 19:54)      OVERNIGHT EVENTS: none     MEDICATIONS  (STANDING):  aspirin  chewable 81 milliGRAM(s) Oral daily  fluconAZOLE   Tablet 200 milliGRAM(s) Oral daily  furosemide   Injectable 40 milliGRAM(s) IV Push daily  midodrine 10 milliGRAM(s) Oral every 8 hours  simethicone 80 milliGRAM(s) Chew daily    MEDICATIONS  (PRN):    Allergies    penicillin (Unknown)    Intolerances    Milk (Other)      SUBJECTIVE: in bed in NAD, no acute events overnight     Vital Signs Last 24 Hrs  T(C): 36.7 (12 Aug 2018 05:00), Max: 37.3 (11 Aug 2018 13:27)  T(F): 98 (12 Aug 2018 05:00), Max: 99.1 (11 Aug 2018 13:27)  HR: 80 (12 Aug 2018 08:42) (74 - 84)  BP: 129/76 (12 Aug 2018 05:00) (109/66 - 129/76)  BP(mean): --  RR: 18 (12 Aug 2018 05:00) (16 - 18)  SpO2: 94% (12 Aug 2018 08:42) (94% - 97%)    PHYSICAL EXAM:  GENERAL: NAD, well-groomed, well-developed, visually impaired  HEAD:  Atraumatic, Normocephalic  EYES: EOMI, PERRLA, conjunctiva and sclera clear  ENMT: No tonsillar erythema, exudates, or enlargement; Moist mucous membranes, Good dentition, No lesions  NECK: Supple, No JVD, Normal thyroid  CHEST/LUNG: Clear to  auscultation bilaterally; No rales, rhonchi, wheezing, or rubs  bilaterally  HEART: Regular rate and rhythm; No murmurs, rubs, or gallops  ABDOMEN: Soft, Nontender, Nondistended; Bowel sounds present  EXTREMITIES:  2+ Peripheral Pulses, No clubbing, cyanosis, or woody b/l lower extremities old healed ulcer on left lower extremity, hyperkeratotic toes.    left upper extremity swelling forearm, and palpable vein   SKIN: No rashes or lesions  NERVOUS SYSTEM:  Alert & Oriented X3, Good concentration; Motor Strength 4 /5 B/L upper and lower extremities;   DTRs 2+ intact and symmetric, sensation intact BL    LABS:                               9.3    7.68  )-----------( 286      ( 11 Aug 2018 12:38 )             29.9   08-11    145  |  106  |  16  ----------------------------<  104<H>  3.8   |  32<H>  |  0.69    Ca    8.8      11 Aug 2018 12:38  Phos  1.8     08-11  Mg     1.9     08-11          Cultures;   CAPILLARY BLOOD GLUCOSE        Lipid panel:           RADIOLOGY & ADDITIONAL TESTS:      Imaging Personally Reviewed:  [ x] YES      Consultant(s) Notes Reviewed:  [x ] YES     Care Discussed with [x ] Consultants [X ] Patient [x ] Family  [x ]    [x ]  Other; RN
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
## 2018-08-12 NOTE — PROGRESS NOTE ADULT - PROBLEM SELECTOR PLAN 3
s/p shock    due to uti poa    urine cx was positive for e coli, klebsiella, enterococcus faecalis ..s/p antibx per id    continue with antifungal  per ID recommendation

## 2018-08-13 PROCEDURE — 99233 SBSQ HOSP IP/OBS HIGH 50: CPT

## 2018-08-13 RX ORDER — MIDODRINE HYDROCHLORIDE 2.5 MG/1
5 TABLET ORAL EVERY 8 HOURS
Qty: 0 | Refills: 0 | Status: DISCONTINUED | OUTPATIENT
Start: 2018-08-13 | End: 2018-08-14

## 2018-08-13 RX ADMIN — MIDODRINE HYDROCHLORIDE 10 MILLIGRAM(S): 2.5 TABLET ORAL at 05:35

## 2018-08-13 RX ADMIN — SIMETHICONE 80 MILLIGRAM(S): 80 TABLET, CHEWABLE ORAL at 12:04

## 2018-08-13 RX ADMIN — Medication 81 MILLIGRAM(S): at 12:03

## 2018-08-13 RX ADMIN — Medication 40 MILLIGRAM(S): at 05:36

## 2018-08-13 RX ADMIN — MIDODRINE HYDROCHLORIDE 10 MILLIGRAM(S): 2.5 TABLET ORAL at 13:42

## 2018-08-13 RX ADMIN — MIDODRINE HYDROCHLORIDE 5 MILLIGRAM(S): 2.5 TABLET ORAL at 23:48

## 2018-08-13 RX ADMIN — FLUCONAZOLE 200 MILLIGRAM(S): 150 TABLET ORAL at 12:03

## 2018-08-13 NOTE — PROGRESS NOTE ADULT - PROBLEM SELECTOR PLAN 4
continue with diuretics PRN  assuming acute on admission given her presentation and ct chest findings   taper midodrine. will decrease to 5m g q8 today   start chf BP meds? will discuss with cardiology as bp more stable

## 2018-08-13 NOTE — PROGRESS NOTE ADULT - SUBJECTIVE AND OBJECTIVE BOX
Patient is a 54y old  Female who presents with a chief complaint of Sepsis/septic shock, ?HRS, CRS, Liver failure, severe HAGMA (19 Jul 2018 19:54)      OVERNIGHT EVENTS: none. ambulating with PT today. feeding and eliminating well.     MEDICATIONS  (STANDING):  aspirin  chewable 81 milliGRAM(s) Oral daily  fluconAZOLE   Tablet 200 milliGRAM(s) Oral daily  furosemide   Injectable 40 milliGRAM(s) IV Push daily  midodrine 10 milliGRAM(s) Oral every 8 hours  simethicone 80 milliGRAM(s) Chew daily    MEDICATIONS  (PRN):      Allergies    penicillin (Unknown)    Intolerances    Milk (Other)      SUBJECTIVE: in bed in NAD, no acute events overnight     Vital Signs Last 24 Hrs  T(C): 36.8 (13 Aug 2018 17:24), Max: 37.1 (13 Aug 2018 12:00)  T(F): 98.3 (13 Aug 2018 17:24), Max: 98.7 (13 Aug 2018 12:00)  HR: 81 (13 Aug 2018 17:24) (77 - 88)  BP: 142/76 (13 Aug 2018 17:24) (102/59 - 142/76)  BP(mean): --  RR: 19 (13 Aug 2018 17:24) (16 - 19)  SpO2: 99% (13 Aug 2018 17:24) (95% - 99%)    PHYSICAL EXAM:  GENERAL: NAD, well-groomed, well-developed, visually impaired  HEAD:  Atraumatic, Normocephalic  EYES: EOMI, PERRLA, conjunctiva and sclera clear  ENMT: No tonsillar erythema, exudates, or enlargement; Moist mucous membranes, Good dentition, No lesions  NECK: Supple, No JVD, Normal thyroid  CHEST/LUNG: Decreased breath sounds at the bases.  No rales, rhonchi, wheezing, or rubs  bilaterally. no distress  HEART: Regular rate and rhythm; No murmurs, rubs, or gallops  ABDOMEN: Soft, Nontender, Nondistended; Bowel sounds present  EXTREMITIES:  2+ Peripheral Pulses, No clubbing, cyanosis, or woody b/l lower extremities old healed ulcer on left lower extremity, hyperkeratotic toes.    left upper extremity swelling forearm, and palpable vein   SKIN: No rashes or lesions  NERVOUS SYSTEM:  Alert & Oriented X3, Good concentration; Motor Strength 4 /5 B/L upper and lower extremities;   DTRs 2+ intact and symmetric, sensation intact BL    LABS:                                Cultures;   CAPILLARY BLOOD GLUCOSE        Lipid panel:           RADIOLOGY & ADDITIONAL TESTS:        Imaging Personally Reviewed:  [ x] YES      Consultant(s) Notes Reviewed:  [x ] YES     Care Discussed with [x ] Consultants [X ] Patient [x ] Family  [x ]    [x ]  Other; RN

## 2018-08-13 NOTE — PROGRESS NOTE ADULT - SUBJECTIVE AND OBJECTIVE BOX
Patient seen and examined bedside resting comfortably.  No urological complaints offered.   Denies nausea and vomiting. Tolerating diet.  Flatus/BM. +  using CPAP for SOB , as needed  T(F): 98.2 (08-13-18 @ 05:50), Max: 98.3 (08-12-18 @ 13:43)  HR: 83 (08-13-18 @ 06:16) (77 - 96)  BP: 112/73 (08-13-18 @ 05:50) (112/73 - 130/72)  RR: 16 (08-13-18 @ 05:50) (16 - 18)  SpO2: 98% (08-13-18 @ 06:16) (95% - 99%)    PHYSICAL EXAM:  General: NAD, WDWN  Neuro:  Alert & oriented x 3  Abdomen: soft, NTND. Normactive BS  : voiding spontaneously. No reports of blood in urine.    LABS:  no new labs today.          Impression: 54y Female admitted with septic shock & emphysematous pyelonephritis & non-obstructing staghorn calculus in left kidney  heart failure  candida in ureine    Plan:  - continue IVAB per ID - diflucan  - continue medical f/u  -Plan by Dr. Frances is eventual removal of staghorn calculus, when medically stable Patient seen and examined bedside resting comfortably.  No urological complaints offered.   Denies nausea and vomiting. Tolerating diet.  Flatus/BM. +  using CPAP for SOB , as needed  T(F): 98.2 (08-13-18 @ 05:50), Max: 98.3 (08-12-18 @ 13:43)  HR: 83 (08-13-18 @ 06:16) (77 - 96)  BP: 112/73 (08-13-18 @ 05:50) (112/73 - 130/72)  RR: 16 (08-13-18 @ 05:50) (16 - 18)  SpO2: 98% (08-13-18 @ 06:16) (95% - 99%)    PHYSICAL EXAM:  General: NAD, WDWN  Neuro:  Alert & oriented x 3  Abdomen: soft, NTND. Normactive BS  : voiding spontaneously. No reports of blood in urine.    LABS:  no new labs today.          Impression: 54y Female admitted with septic shock & emphysematous pyelonephritis & non-obstructing staghorn calculus in left kidney  heart failure  candida in ureine    Plan:  - continue IVAB per ID - diflucan  - continue medical f/u  -Pt. discussed with Dr. Frances, via phone. He says pt will need eventual nephrostolithotmy, when pt is medically stable. However, he does not envision doing this on this admission. From his point of view, pt can be discharged when OK with medicine and elective surgery can be planned.

## 2018-08-13 NOTE — PROGRESS NOTE ADULT - ASSESSMENT
Patient is a 54y old  Female who presents with a chief complaint of Sepsis/septic shock, resp failure resolved. clinically improving and is ambulating well with PT

## 2018-08-13 NOTE — PROGRESS NOTE ADULT - PROBLEM SELECTOR PLAN 5
eventual nephrosolithotripsy when medically stable urology, but as per urology not on this admission ..

## 2018-08-14 LAB
ANION GAP SERPL CALC-SCNC: 10 MMOL/L — SIGNIFICANT CHANGE UP (ref 5–17)
BUN SERPL-MCNC: 16 MG/DL — SIGNIFICANT CHANGE UP (ref 7–23)
CALCIUM SERPL-MCNC: 9.1 MG/DL — SIGNIFICANT CHANGE UP (ref 8.5–10.1)
CHLORIDE SERPL-SCNC: 107 MMOL/L — SIGNIFICANT CHANGE UP (ref 96–108)
CO2 SERPL-SCNC: 25 MMOL/L — SIGNIFICANT CHANGE UP (ref 22–31)
CREAT SERPL-MCNC: 0.74 MG/DL — SIGNIFICANT CHANGE UP (ref 0.5–1.3)
GLUCOSE SERPL-MCNC: 90 MG/DL — SIGNIFICANT CHANGE UP (ref 70–99)
HCT VFR BLD CALC: 31.6 % — LOW (ref 34.5–45)
HGB BLD-MCNC: 9.9 G/DL — LOW (ref 11.5–15.5)
MAGNESIUM SERPL-MCNC: 1.8 MG/DL — SIGNIFICANT CHANGE UP (ref 1.6–2.6)
MCHC RBC-ENTMCNC: 29.2 PG — SIGNIFICANT CHANGE UP (ref 27–34)
MCHC RBC-ENTMCNC: 31.3 GM/DL — LOW (ref 32–36)
MCV RBC AUTO: 93.2 FL — SIGNIFICANT CHANGE UP (ref 80–100)
NRBC # BLD: 0 /100 WBCS — SIGNIFICANT CHANGE UP (ref 0–0)
PHOSPHATE SERPL-MCNC: 2.9 MG/DL — SIGNIFICANT CHANGE UP (ref 2.5–4.5)
PLATELET # BLD AUTO: 295 K/UL — SIGNIFICANT CHANGE UP (ref 150–400)
POTASSIUM SERPL-MCNC: 3.7 MMOL/L — SIGNIFICANT CHANGE UP (ref 3.5–5.3)
POTASSIUM SERPL-SCNC: 3.7 MMOL/L — SIGNIFICANT CHANGE UP (ref 3.5–5.3)
RBC # BLD: 3.39 M/UL — LOW (ref 3.8–5.2)
RBC # FLD: 19.3 % — HIGH (ref 10.3–14.5)
SODIUM SERPL-SCNC: 142 MMOL/L — SIGNIFICANT CHANGE UP (ref 135–145)
WBC # BLD: 7.88 K/UL — SIGNIFICANT CHANGE UP (ref 3.8–10.5)
WBC # FLD AUTO: 7.88 K/UL — SIGNIFICANT CHANGE UP (ref 3.8–10.5)

## 2018-08-14 PROCEDURE — 99233 SBSQ HOSP IP/OBS HIGH 50: CPT

## 2018-08-14 RX ORDER — MIDODRINE HYDROCHLORIDE 2.5 MG/1
2.5 TABLET ORAL THREE TIMES A DAY
Qty: 0 | Refills: 0 | Status: DISCONTINUED | OUTPATIENT
Start: 2018-08-14 | End: 2018-08-17

## 2018-08-14 RX ORDER — FUROSEMIDE 40 MG
40 TABLET ORAL DAILY
Qty: 0 | Refills: 0 | Status: DISCONTINUED | OUTPATIENT
Start: 2018-08-14 | End: 2018-08-17

## 2018-08-14 RX ADMIN — MIDODRINE HYDROCHLORIDE 2.5 MILLIGRAM(S): 2.5 TABLET ORAL at 21:14

## 2018-08-14 RX ADMIN — Medication 81 MILLIGRAM(S): at 11:47

## 2018-08-14 RX ADMIN — MIDODRINE HYDROCHLORIDE 5 MILLIGRAM(S): 2.5 TABLET ORAL at 06:59

## 2018-08-14 RX ADMIN — Medication 40 MILLIGRAM(S): at 06:59

## 2018-08-14 RX ADMIN — FLUCONAZOLE 200 MILLIGRAM(S): 150 TABLET ORAL at 15:54

## 2018-08-14 NOTE — PROGRESS NOTE ADULT - ATTENDING COMMENTS
PT recommending [facility], but insurance an issue and lack of identification an issue, SW to apply to [facility] and [facility], continue to try and speak with family regarding discharge planning. Pt. is medically stable for discharge.

## 2018-08-14 NOTE — PROGRESS NOTE ADULT - SUBJECTIVE AND OBJECTIVE BOX
Patient is a 54y old  Female who presents with a chief complaint of Sepsis/septic shock, ?HRS, CRS, Liver failure, severe HAGMA (19 Jul 2018 19:54)      INTERVAL HPI/OVERNIGHT EVENTS:  no new complaints, pt somewhat agitated, but can be redirected to calm down.    MEDICATIONS  (STANDING):  aspirin  chewable 81 milliGRAM(s) Oral daily  furosemide    Tablet 40 milliGRAM(s) Oral daily  midodrine 2.5 milliGRAM(s) Oral three times a day  simethicone 80 milliGRAM(s) Chew daily    MEDICATIONS  (PRN):      Allergies    penicillin (Unknown)    Intolerances    Milk (Other)      REVIEW OF SYSTEMS:  difficult to assess as not a reliable historian but offers no new physical complaints    Vital Signs Last 24 Hrs  T(C): 36.6 (14 Aug 2018 17:31), Max: 36.9 (13 Aug 2018 23:52)  T(F): 97.9 (14 Aug 2018 17:31), Max: 98.5 (13 Aug 2018 23:52)  HR: 88 (14 Aug 2018 17:31) (79 - 105)  BP: 125/75 (14 Aug 2018 17:31) (90/68 - 125/75)  BP(mean): --  RR: 16 (14 Aug 2018 17:31) (16 - 20)  SpO2: 76% (14 Aug 2018 17:31) (76% - 100%)    PHYSICAL EXAM:  GENERAL: NAD,   HEAD:  Atraumatic, Normocephalic  EYES: EOMI, PERRLA, conjunctiva and sclera clear  ENMT: No tonsillar erythema, exudates, or enlargement; Moist mucous membranes, poor dentition  NECK: Supple, No JVD, Normal thyroid  NERVOUS SYSTEM:  Alert , Good concentration; Motor Strength 5/5 B/L upper and lower extremities  CHEST/LUNG: Clear to percussion bilaterally; No rales, rhonchi, wheezing, or rubs  HEART: Regular rate and rhythm; No murmurs, rubs, or gallops  ABDOMEN: Soft, Nontender, Nondistended; Bowel sounds present  EXTREMITIES:  2+ Peripheral Pulses, No clubbing, cyanosis, or edema  LYMPH: No lymphadenopathy noted  SKIN: No rashes or lesions    LABS:                        9.9    7.88  )-----------( 295      ( 14 Aug 2018 11:13 )             31.6     08-14    142  |  107  |  16  ----------------------------<  90  3.7   |  25  |  0.74    Ca    9.1      14 Aug 2018 11:13  Phos  2.9     08-14  Mg     1.8     08-14          CAPILLARY BLOOD GLUCOSE          RADIOLOGY & ADDITIONAL TESTS:    Imaging Personally Reviewed:  [x ] YES  [ ] NO    Consultant(s) Notes Reviewed:  [x ] YES  [ ] NO    Care Discussed with Consultants/Other Providers [x ] YES  [ ] NO

## 2018-08-14 NOTE — PROGRESS NOTE ADULT - SUBJECTIVE AND OBJECTIVE BOX
Patient seen and examined bedside resting comfortably.  No complaints offered.   Voiding well  Denies nausea and vomiting. Tolerating diet.  Flatus/BM. +  Denies chest pain, dyspnea, cough.  T(F): 98.4 (08-14-18 @ 05:22), Max: 98.7 (08-13-18 @ 12:00)  HR: 88 (08-14-18 @ 05:22) (77 - 105)  BP: 111/43 (08-14-18 @ 05:22) (102/59 - 142/76)  RR: 18 (08-14-18 @ 05:22) (16 - 19)  SpO2: 96% (08-14-18 @ 05:22) (96% - 100%)    PHYSICAL EXAM:  General: NAD, WDWN  Neuro:  Alert & oriented x 3  Abdomen: soft, NTND. Normactive BS  STEFFI: spontaneously voiding      LABS:  no new labs today    Impression: 54y Female with UTI, stag horn calculus    Plan:  Per Dr. Frances, pt will need eventual removal of stag  horn calculus when medically stable. However, he is not planning on doing it this admission.  Continue medical tx.

## 2018-08-14 NOTE — PROGRESS NOTE ADULT - PROBLEM SELECTOR PLAN 4
as per cards, change to lasix po, cont aspirin , fu with cards as outpatient  bp on low side, will restart lower dose of midodrine 2.5mg po tid. ok to d/c on midodrine if necessary

## 2018-08-14 NOTE — PROGRESS NOTE ADULT - SUBJECTIVE AND OBJECTIVE BOX
TMAX - 97.7    On day # 24 Diflucan now    Vital Signs Last 24 Hrs  T(C): 36.8 (14 Aug 2018 11:16), Max: 36.9 (13 Aug 2018 23:52)  T(F): 98.3 (14 Aug 2018 11:16), Max: 98.5 (13 Aug 2018 23:52)  HR: 90 (14 Aug 2018 16:27) (79 - 105)  BP: 125/70 (14 Aug 2018 16:27) (90/68 - 125/70)  BP(mean): --  RR: 17 (14 Aug 2018 16:27) (17 - 20)  SpO2: 95% (14 Aug 2018 16:27) (95% - 100%)  Supplemental O2:  on RA now      Awake, alert, sitting up on the bed now.  Claims to be feeling much better, with no cp, no SOB, no abdominal pain, and no back pain now.  Urinating without any difficulty.      PHYSICAL EXAM  General:  awake, alert, sitting up on the bed, cooperative and in NAD now  HEENT:  conj pink, sclerae anicteric, PERRLA, no oral lesions noted, mostly edentulous - one tooth Lt lower gum line  Neck:  supple, no nodes noted  Heart:  RR  Lungs: clear bilaterally   Abdomen:  soft, BS+, nontender to palpation  No CVA or Spinal tenderness elicited  Extremities:  2 + edema LE's                     chronic skin changes both LE's  Skin:  warm, dry, no rash noted        LABS:  CBC Full  -  ( 14 Aug 2018 11:13 )  WBC Count : 7.88 K/uL  Hemoglobin : 9.9 g/dL  Hematocrit : 31.6 %  Platelet Count - Automated : 295 K/uL  Mean Cell Volume : 93.2 fl  Mean Cell Hemoglobin : 29.2 pg  Mean Cell Hemoglobin Concentration : 31.3 gm/dL  Auto Neutrophil # : x  Auto Lymphocyte # : x  Auto Monocyte # : x  Auto Eosinophil # : x  Auto Basophil # : x  Auto Neutrophil % : x  Auto Lymphocyte % : x  Auto Monocyte % : x  Auto Eosinophil % : x  Auto Basophil % : x    08-14    142  |  107  |  16  ----------------------------<  90  3.7   |  25  |  0.74    Ca    9.1      14 Aug 2018 11:13  Phos  2.9     08-14  Mg     1.8     08-14        Impression:  Slowly improving clinically now on Diflucan alone for continued rx of the initial Sepsis secondary to Lt. Emphysematous Pyelonephritis with an underlying Staghorn Calculus.  temps remain decreased and WBC's are WNL off other ab rx for a few days now.    Suggestions:  Wll therefore d/c Diflucan now and observe.  Follow-up temps and labs.  For further rx of her underlying Staghorn calculus to be done as an outpatient with Urology.  Discussed with patient in detail at bedside.

## 2018-08-15 PROCEDURE — 99232 SBSQ HOSP IP/OBS MODERATE 35: CPT

## 2018-08-15 PROCEDURE — 71045 X-RAY EXAM CHEST 1 VIEW: CPT | Mod: 26,59

## 2018-08-15 PROCEDURE — 71046 X-RAY EXAM CHEST 2 VIEWS: CPT | Mod: 26

## 2018-08-15 RX ADMIN — Medication 40 MILLIGRAM(S): at 08:56

## 2018-08-15 RX ADMIN — MIDODRINE HYDROCHLORIDE 2.5 MILLIGRAM(S): 2.5 TABLET ORAL at 14:02

## 2018-08-15 RX ADMIN — MIDODRINE HYDROCHLORIDE 2.5 MILLIGRAM(S): 2.5 TABLET ORAL at 22:46

## 2018-08-15 RX ADMIN — MIDODRINE HYDROCHLORIDE 2.5 MILLIGRAM(S): 2.5 TABLET ORAL at 06:16

## 2018-08-15 RX ADMIN — SIMETHICONE 80 MILLIGRAM(S): 80 TABLET, CHEWABLE ORAL at 12:01

## 2018-08-15 RX ADMIN — Medication 81 MILLIGRAM(S): at 12:01

## 2018-08-15 NOTE — PROGRESS NOTE ADULT - SUBJECTIVE AND OBJECTIVE BOX
INTERVAL HPI:   54 year female with no significant PMH, poor historian, brought to ED with weakness. Found to be in rapid A Fib(requiring cardioversion in ED), Hypoglycemic, Severe metabolic acidosis, High LFTs, Coagulopathy. Further more was hypotensive and in Respiratory distress. Required intubation, admitted to ICU with Vent and pressor support.  Had prolonged hospital course with Respiratory failure and septic shock. Initially failed weaning attempts but eventually got extubated and transferred out of ICU.  Ex smoker.  Urine culture grew E Coli, Klebsiella, Coagulase Negative Staph and Enterococcus(not a contaminant per I    OVERNIGHT EVENTS:  Out of bed to chair and comfortable.    Vital Signs Last 24 Hrs  T(C): 36.8 (15 Aug 2018 05:40), Max: 37.4 (14 Aug 2018 23:43)  T(F): 98.2 (15 Aug 2018 05:40), Max: 99.3 (14 Aug 2018 23:43)  HR: 85 (15 Aug 2018 11:52) (80 - 91)  BP: 112/70 (15 Aug 2018 11:52) (96/71 - 125/75)  BP(mean): --  RR: 16 (15 Aug 2018 11:52) (16 - 18)  SpO2: 100% (15 Aug 2018 11:52) (76% - 100%)    PHYSICAL EXAM:  GEN:         Awake, responsive and comfortable.  HEENT:    Normal.    RESP:       no wheezing.  CVS:         Regular rate and rhythm.   ABD:         Soft, non-tender, non-distended;     MEDICATIONS  (STANDING):  aspirin  chewable 81 milliGRAM(s) Oral daily  furosemide    Tablet 40 milliGRAM(s) Oral daily  midodrine 2.5 milliGRAM(s) Oral three times a day  simethicone 80 milliGRAM(s) Chew daily    LABS:                        9.9    7.88  )-----------( 295      ( 14 Aug 2018 11:13 )             31.6     08-14    142  |  107  |  16  ----------------------------<  90  3.7   |  25  |  0.74    Ca    9.1      14 Aug 2018 11:13  Phos  2.9     08-14  Mg     1.8     08-14    ASSESSMENT AND PLAN:  ·	S/P Septic Shock..  ·	S/P Respiratory failure.  ·	UTI.  ·	Anemia.  ·	Hypokalemia.  ·	Mild LV dysfunction.   ·	Valvular heart disease(MR, TR).    Pulmonary status better.  Will repeat CXR.

## 2018-08-15 NOTE — PROGRESS NOTE ADULT - PROBLEM SELECTOR PLAN 4
as per cards, change to lasix po, cont aspirin , fu with cards as outpatient  bp on low side, will restart lower dose of midodrine 2.5mg po tid. ok to d/c on midodrine if necessary  has decreased breath sounds in the left but HDS. c/o of transient orthopnea. discussed with pulm and will get chest xray to eval. saturating well on on ra with exertion

## 2018-08-15 NOTE — PROGRESS NOTE ADULT - SUBJECTIVE AND OBJECTIVE BOX
UROLOGY PROGRESS HPI:  Pt seen and examined at bedside. Denies any pain or complaints. Voiding well.  Denies hematuria, dysuria, frequency. Pt denies chest pain, SOB, dizziness, fever, chills.      Vital Signs Last 24 Hrs  T(C): 36.8 (15 Aug 2018 05:40), Max: 37.4 (14 Aug 2018 23:43)  T(F): 98.2 (15 Aug 2018 05:40), Max: 99.3 (14 Aug 2018 23:43)  HR: 90 (15 Aug 2018 08:21) (79 - 91)  BP: 101/68 (15 Aug 2018 08:21) (90/68 - 125/75)  RR: 16 (15 Aug 2018 08:21) (16 - 20)  SpO2: 98% (15 Aug 2018 08:21) (76% - 99%)      PHYSICAL EXAM:    GENERAL: NAD  CHEST/LUNG: Clear to ausculation, bilaterally   HEART: RRR S1S2  ABDOMEN: NTND  : no suprapubic tenderness  EXTREMITIES:  calf soft, non tender b/l    I&O's Detail    14 Aug 2018 07:01  -  15 Aug 2018 07:00  --------------------------------------------------------  IN:    Oral Fluid: 260 mL  Total IN: 260 mL    OUT:  Total OUT: 0 mL    Total NET: 260 mL          LABS:                        9.9    7.88  )-----------( 295      ( 14 Aug 2018 11:13 )             31.6     08-14    142  |  107  |  16  ----------------------------<  90  3.7   |  25  |  0.74    Ca    9.1      14 Aug 2018 11:13  Phos  2.9     08-14  Mg     1.8     08-14        Assessment: 52F with staghorn calculus    Plan:  - continue IVAB per ID  - continue medical f/u  - eventual nephrostolithotomy when medically stable, not on this admission per Dr. Frances
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
## 2018-08-15 NOTE — PROGRESS NOTE BEHAVIORAL HEALTH - NSBHCONSULTFOLLOWAFTERCARE_PSY_A_CORE FT
[facility]. walk in clinic : [phone] (9AM-7PM)  [facility]: [phone]

## 2018-08-15 NOTE — PROGRESS NOTE ADULT - SUBJECTIVE AND OBJECTIVE BOX
Patient is a 54y old  Female who presents with a chief complaint of Sepsis/septic shock, ?HRS, CRS, Liver failure, severe HAGMA (19 Jul 2018 19:54)      INTERVAL HPI/OVERNIGHT EVENTS:  no overnight events, pt c/o of orthopnea at times    MEDICATIONS  (STANDING):  aspirin  chewable 81 milliGRAM(s) Oral daily  furosemide    Tablet 40 milliGRAM(s) Oral daily  midodrine 2.5 milliGRAM(s) Oral three times a day  simethicone 80 milliGRAM(s) Chew daily    MEDICATIONS  (PRN):        Allergies    penicillin (Unknown)    Intolerances    Milk (Other)      REVIEW OF SYSTEMS:  difficult to assess as not a reliable historian but offers no new physical complaints other than what is stated in hpi     MEDICATIONS  (STANDING):  aspirin  chewable 81 milliGRAM(s) Oral daily  furosemide    Tablet 40 milliGRAM(s) Oral daily  midodrine 2.5 milliGRAM(s) Oral three times a day  simethicone 80 milliGRAM(s) Chew daily    MEDICATIONS  (PRN):      PHYSICAL EXAM:  GENERAL: NAD,   HEAD:  Atraumatic, Normocephalic  EYES: EOMI, PERRLA, conjunctiva and sclera clear  ENMT: No tonsillar erythema, exudates, or enlargement; Moist mucous membranes, poor dentition  NECK: Supple, No JVD, Normal thyroid  NERVOUS SYSTEM:  Alert , Good concentration; Motor Strength 5/5 B/L upper and lower extremities  CHEST/LUNG: decreased breath sounds in the left base; no distress, No rales, rhonchi, wheezing, or rubs  HEART: Regular rate and rhythm; No murmurs, rubs, or gallops  ABDOMEN: Soft, Nontender, Nondistended; Bowel sounds present  EXTREMITIES:  2+ Peripheral Pulses, No clubbing, cyanosis, or edema  LYMPH: No lymphadenopathy noted  SKIN: No rashes or lesions    LABS:                                 9.9    7.88  )-----------( 295      ( 14 Aug 2018 11:13 )             31.6     08-14    142  |  107  |  16  ----------------------------<  90  3.7   |  25  |  0.74    Ca    9.1      14 Aug 2018 11:13  Phos  2.9     08-14  Mg     1.8     08-14                CAPILLARY BLOOD GLUCOSE          RADIOLOGY & ADDITIONAL TESTS:    Imaging Personally Reviewed:  [x ] YES  [ ] NO    Consultant(s) Notes Reviewed:  [x ] YES  [ ] NO    Care Discussed with Consultants/Other Providers [x ] YES  [ ] NO

## 2018-08-15 NOTE — PROGRESS NOTE BEHAVIORAL HEALTH - SUMMARY
Patient is a 53 yo woman with PMH of ground Hepatomegaly, nonspecific gallbladder wall thickening. Left nephrolithiasis. Pt. initially admitted to the ICU for sepsis,  intubated on mechanical ventilation, now s/p extubation and transferred to floor. Ct abdomen showed Staghorn calculus in the left kidney, pt. needs repeat CT abdomen to r/o pyelonephritis. Psychiatry was initially consulted as pt. was refusing CT abdomen, but then agreed.  She had a MICU stay and is now very thankful that she survived and is feeling well.     Pt had recommendations for PRNS but has not required any.    Plan:  Seroquel 25mg PO q6h PRN mild agitation if qtc <500   Haldol 2mg IM/IV q6h PRN for severe agitation if QTC <500

## 2018-08-15 NOTE — PROGRESS NOTE ADULT - ASSESSMENT
Patient is a 54y old  Female who presents with a chief complaint of Sepsis/septic shock, resp failure resolved. clinically improving and is ambulating well with PT but not at baseline

## 2018-08-15 NOTE — PROGRESS NOTE ADULT - PROBLEM SELECTOR PLAN 3
s/p shock    due to uti poa    urine cx was positive for e coli, klebsiella, enterococcus faecalis ..s/p antibx per id  d/c  antifungal  per ID recommendation  BP and wbc stable

## 2018-08-16 ENCOUNTER — TRANSCRIPTION ENCOUNTER (OUTPATIENT)
Age: 58
End: 2018-08-16

## 2018-08-16 PROCEDURE — 99232 SBSQ HOSP IP/OBS MODERATE 35: CPT

## 2018-08-16 RX ORDER — MIDODRINE HYDROCHLORIDE 2.5 MG/1
1 TABLET ORAL
Qty: 0 | Refills: 0 | COMMUNITY
Start: 2018-08-16

## 2018-08-16 RX ORDER — ASPIRIN/CALCIUM CARB/MAGNESIUM 324 MG
1 TABLET ORAL
Qty: 0 | Refills: 0 | COMMUNITY
Start: 2018-08-16

## 2018-08-16 RX ORDER — FUROSEMIDE 40 MG
1 TABLET ORAL
Qty: 0 | Refills: 0 | COMMUNITY
Start: 2018-08-16

## 2018-08-16 RX ORDER — SIMETHICONE 80 MG/1
1 TABLET, CHEWABLE ORAL
Qty: 0 | Refills: 0 | COMMUNITY
Start: 2018-08-16

## 2018-08-16 RX ADMIN — MIDODRINE HYDROCHLORIDE 2.5 MILLIGRAM(S): 2.5 TABLET ORAL at 21:51

## 2018-08-16 RX ADMIN — Medication 81 MILLIGRAM(S): at 11:23

## 2018-08-16 RX ADMIN — MIDODRINE HYDROCHLORIDE 2.5 MILLIGRAM(S): 2.5 TABLET ORAL at 06:39

## 2018-08-16 NOTE — DISCHARGE NOTE ADULT - PROVIDER TOKENS
TOKEN:'[id number]',FREE:[LAST:[PMD],PHONE:[(   )    -],FAX:[(   )    -]] TOKEN:'[id number]',FREE:[LAST:[PMD],PHONE:[(   )    -],FAX:[(   )    -]],TOKEN:'[id number]'

## 2018-08-16 NOTE — PROGRESS NOTE ADULT - PROBLEM SELECTOR PLAN 4
as per cards, change to lasix po, cont aspirin , fu with cards as outpatient  bp on low side, will restart lower dose of midodrine 2.5mg po tid. will d/c on midodrine   has decreased breath sounds in the left but HDS. c/o of transient orthopnea. discussed with pulm and will get chest xray to alayna. saturating well on on ra with exertion

## 2018-08-16 NOTE — PROGRESS NOTE ADULT - ASSESSMENT
Patient is a 54y old  Female who presents with a chief complaint of Sepsis/septic shock, resp failure resolved. clinically improving and is ambulating well with PT but not at baseline. awaiting dc to rehab

## 2018-08-16 NOTE — DISCHARGE NOTE ADULT - CARE PLAN
Principal Discharge DX:	Atrial fibrillation with RVR  Goal:	Resolved  Assessment and plan of treatment:	Follow up with Primary Medical Doctor.  Secondary Diagnosis:	Sepsis, due to unspecified organism  Goal:	Resolved  Assessment and plan of treatment:	Related to UTI. Treated with antibiotics  Secondary Diagnosis:	Hypotension, unspecified hypotension type  Goal:	Resolved  Assessment and plan of treatment:	Continue with Midodrine  Secondary Diagnosis:	Hypokalemia  Goal:	Resolved  Assessment and plan of treatment:	Follow up with Primary Medical Doctor

## 2018-08-16 NOTE — DISCHARGE NOTE ADULT - PLAN OF CARE
Resolved Follow up with Primary Medical Doctor. Related to UTI. Treated with antibiotics Continue with Midodrine Follow up with Primary Medical Doctor

## 2018-08-16 NOTE — DISCHARGE NOTE ADULT - PATIENT PORTAL LINK FT
You can access the [facility] Patient Portal, offered by [facility], by registering with the following website: [email or web address]

## 2018-08-16 NOTE — DISCHARGE NOTE ADULT - CARE PROVIDER_API CALL
Doyle Lainez), Cardiology; Cardiovascular Disease; Nuclear Cardiology  [address] [place] [address]  Phone: [phone]  Fax: [phone]    PMD,   Phone: (   )    -  Fax: (   )    - Doyle Lainez), Cardiology; Cardiovascular Disease; Nuclear Cardiology  [address] [place] [address]  Phone: [phone]  Fax: [phone]    PMD,   Phone: (   )    -  Fax: (   )    -    Oscar Frances), Urology  [address] [place] [address]  Phone: [phone]  Fax: [phone]

## 2018-08-16 NOTE — PROGRESS NOTE ADULT - PROBLEM SELECTOR PLAN 5
eventual nephrolithotripsy when medically stable , but as per urology not on this admission ..  follow up as outpatient

## 2018-08-16 NOTE — PROGRESS NOTE ADULT - SUBJECTIVE AND OBJECTIVE BOX
Patient seen and examined bedside resting comfortably.  Complains of being "cold". No acute overnight events.  Voiding spontaneously without difficulty, denies hematuria or dysuria.  Denies chest pain, sob, dizziness.     T(F): 97.7 (08-16-18 @ 06:36), Max: 97.7 (08-16-18 @ 06:36)  HR: 88 (08-16-18 @ 08:05) (80 - 90)  BP: 96/67 (08-16-18 @ 06:36) (96/67 - 145/77)  RR: 16 (08-16-18 @ 06:36) (16 - 18)  SpO2: 99% (08-16-18 @ 08:05) (97% - 100%)    PHYSICAL EXAM:    General: NAD, alert and awake  HEENT: NCAT, EOMI, conjunctiva clear  Chest: nonlabored respirations  Abdomen: soft, NT/ND.   Extremities: Calf soft, nontender b/l.   : No suprapubic tenderness or bladder distention.      LABS:                        9.9    7.88  )-----------( 295      ( 14 Aug 2018 11:13 )             31.6   08-14    142  |  107  |  16  ----------------------------<  90  3.7   |  25  |  0.74    Ca    9.1      14 Aug 2018 11:13  Phos  2.9     08-14  Mg     1.8     08-14      I&O's Detail    15 Aug 2018 07:01  -  16 Aug 2018 07:00  --------------------------------------------------------  IN:    Oral Fluid: 250 mL  Total IN: 250 mL    OUT:  Total OUT: 0 mL    Total NET: 250 mL      Impression: 54y Female with UTI, stag horn calculus    Plan:  Per Dr. Frances, pt will need eventual removal of stag  horn calculus when medically stable. However, he is not planning on doing it this admission.  Continue medical tx, d/c planning per medicine

## 2018-08-16 NOTE — PROGRESS NOTE ADULT - SUBJECTIVE AND OBJECTIVE BOX
INTERVAL HPI:  54 year female with no significant PMH, poor historian, brought to ED with weakness. Found to be in rapid A Fib(requiring cardioversion in ED), Hypoglycemic, Severe metabolic acidosis, High LFTs, Coagulopathy. Further more was hypotensive and in Respiratory distress. Required intubation, admitted to ICU with Vent and pressor support.  Had prolonged hospital course with Respiratory failure and septic shock. Initially failed weaning attempts but eventually got extubated and transferred out of ICU.  Ex smoker.  Urine culture grew E Coli, Klebsiella, Coagulase Negative Staph and Enterococcus(not a contaminant per I    OVERNIGHT EVENTS:  Comfortable no distress.    Vital Signs Last 24 Hrs  T(C): 36.5 (16 Aug 2018 06:36), Max: 36.5 (16 Aug 2018 06:36)  T(F): 97.7 (16 Aug 2018 06:36), Max: 97.7 (16 Aug 2018 06:36)  HR: 88 (16 Aug 2018 08:05) (80 - 90)  BP: 96/67 (16 Aug 2018 06:36) (96/67 - 145/77)  BP(mean): --  RR: 16 (16 Aug 2018 06:36) (16 - 18)  SpO2: 99% (16 Aug 2018 08:05) (97% - 100%)    PHYSICAL EXAM:  GEN:         Awake, responsive and comfortable.  HEENT:    Normal.    RESP:        no distress  CVS:          Regular rate and rhythm.   ABD:         Soft, non-tender, non-distended;     MEDICATIONS  (STANDING):  aspirin  chewable 81 milliGRAM(s) Oral daily  furosemide    Tablet 40 milliGRAM(s) Oral daily  midodrine 2.5 milliGRAM(s) Oral three times a day  simethicone 80 milliGRAM(s) Chew daily    < from: Xray Chest Decub 1 View, Left (08.15.18 @ 16:54) >    EXAM:  XR CHEST DECUBITUS LT 1V                          PROCEDURE DATE:  08/15/2018      INTERPRETATION:  History: CHF with pleural effusion    Left lateral decubitus radiograph is performed and correlated with PA and   lateral radiographs of earlier in the day.    There is no evidence of pleural effusion. The lungs are clear. The   osseous structures are unremarkable.    Impression: No evidence of pleural effusion.    KATHYA VELASQUEZ M.D.,ATTENDING RADIOLOGIST  This document has been electronically signed. Aug 15 2018  5:31PM      ASSESSMENT AND PLAN:  ·	S/P Septic Shock..  ·	S/P Respiratory failure.  ·	UTI.  ·	Anemia.  ·	Hypokalemia.  ·	Mild LV dysfunction.   ·	Valvular heart disease(MR, TR).    No pleural effusion. Pulmonary status stable.  Smoking cessation and PFT out pt.

## 2018-08-16 NOTE — DISCHARGE NOTE ADULT - MEDICATION SUMMARY - MEDICATIONS TO TAKE
I will START or STAY ON the medications listed below when I get home from the hospital:    aspirin 81 mg oral tablet, chewable  -- 1 tab(s) by mouth once a day  -- Indication: For Coagulopathy    furosemide 40 mg oral tablet  -- 1 tab(s) by mouth once a day  -- Indication: For CHF    midodrine 2.5 mg oral tablet  -- 1 tab(s) by mouth 3 times a day  -- Indication: For Hypotension    simethicone 80 mg oral tablet, chewable  -- 1 tab(s) by mouth once a day  -- Indication: For Bloating

## 2018-08-16 NOTE — DISCHARGE NOTE ADULT - CARE PROVIDERS DIRECT ADDRESSES
,[email or web address],DirectAddress_Unknown ,[email or web address],DirectAddress_Unknown,DirectAddress_Unknown

## 2018-08-16 NOTE — DISCHARGE NOTE ADULT - HOSPITAL COURSE
54 year female with no significant PMH, poor historian, brought to ED with weakness. Found to be in rapid A Fib(requiring cardioversion in ED), Hypoglycemic, Severe metabolic acidosis, High LFTs, Coagulopathy. Further more was hypotensive and in Respiratory distress. Required intubation, admitted to ICU with Vent and pressor support. Had prolonged hospital course with Respiratory failure and septic shock. Initially failed weaning attempts but eventually got extubated and transferred out of ICU. Patient has been stable on the medicine floors since. Blood pressure has been low and the patient will be sent with prescriptions for midodrine. 54 year female with no significant PMH, poor historian, brought to ED with weakness. Found to be in rapid A Fib(requiring cardioversion in ED), Hypoglycemic, Severe metabolic acidosis, High LFTs, Coagulopathy all likely d.t UTI. Further more was hypotensive and in Respiratory distress. Required intubation, admitted to ICU with Vent and pressor support. Had prolonged hospital course with Respiratory failure and septic shock. Initially failed weaning attempts but eventually got extubated and transferred out of ICU. Patient has been stable on the medicine floors since. Blood pressure has been low and the patient will be sent with prescriptions for midodrine.        Problem/Plan - :  ·  Problem: Sepsis, due to unspecified organism.  Plan: s/p shock    urine cx was positive for e coli, klebsiella, enterococcus faecalis ..s/p antibx per id         Problem/Plan - :  ·  Problem: Acute combined systolic and diastolic congestive heart failure.  Plan: as per cardiology , c/w  lasix po, cont aspirin , fu with cards as outpatient  bp on low side, will restart lower dose of midodrine 2.5mg po tid.       Problem/Plan - :  ·  Problem: Staghorn calculus.  Plan: eventual nephrolithotripsy when medically stable , but as per urology not on this admission ..  follow up as outpatient.

## 2018-08-17 VITALS
OXYGEN SATURATION: 97 % | TEMPERATURE: 97 F | DIASTOLIC BLOOD PRESSURE: 60 MMHG | SYSTOLIC BLOOD PRESSURE: 118 MMHG | RESPIRATION RATE: 20 BRPM | HEART RATE: 86 BPM

## 2018-08-17 PROCEDURE — 99239 HOSP IP/OBS DSCHRG MGMT >30: CPT

## 2018-08-17 RX ADMIN — MIDODRINE HYDROCHLORIDE 2.5 MILLIGRAM(S): 2.5 TABLET ORAL at 05:00

## 2018-08-17 RX ADMIN — Medication 81 MILLIGRAM(S): at 11:33

## 2018-08-17 NOTE — PROGRESS NOTE ADULT - PROBLEM SELECTOR PROBLEM 1
Jaundice
Pyelonephritis, acute necrotizing
Pyelonephritis, acute necrotizing
Hypokalemia
Jaundice
Pyelonephritis, acute necrotizing
Hypokalemia
Jaundice
Jaundice
Hypokalemia

## 2018-08-17 NOTE — PROGRESS NOTE ADULT - SUBJECTIVE AND OBJECTIVE BOX
Patient seen and examined bedside in no acute distress.  No new event. Tolerating diet.  Voiding spontaneously without difficulty, denies hematuria or dysuria.   Denies fever/chills, chest pain, sob.     T(F): 97.9 (08-17-18 @ 04:59), Max: 99.6 (08-16-18 @ 11:21)  HR: 69 (08-17-18 @ 08:34) (69 - 87)  BP: 125/61 (08-17-18 @ 04:59) (100/43 - 134/74)  RR: 17 (08-17-18 @ 04:59) (16 - 20)  SpO2: 97% (08-17-18 @ 08:34) (96% - 98%)    PHYSICAL EXAM:    General: NAD  HEENT: NCAT, EOMI, conjunctiva clear  Chest: nonlabored respirations  Abdomen: soft, NT/ND.   Extremities: Calf soft, nontender b/l.   : No suprapubic tenderness or bladder distention.      I&O's Detail    16 Aug 2018 07:01  -  17 Aug 2018 07:00  --------------------------------------------------------  IN:    Oral Fluid: 300 mL  Total IN: 300 mL    OUT:  Total OUT: 0 mL    Total NET: 300 mL    Impression: 53yo Female with UTI, stag horn calculus    Plan:  Per Dr. Frances, pt will need eventual removal of stag  horn calculus when medically stable. However, he is not planning on doing it this admission.  Continue medical tx,  stable for d/c to rehab

## 2018-08-17 NOTE — PROGRESS NOTE ADULT - PROBLEM SELECTOR PROBLEM 5
Staghorn calculus
Alkalosis
Staghorn calculus

## 2018-08-17 NOTE — PROGRESS NOTE ADULT - PROVIDER SPECIALTY LIST ADULT
Cardiology
Critical Care
Gastroenterology
Hospitalist
Infectious Disease
Nephrology
Pulmonology
Surgery
Urology
Critical Care
Infectious Disease
Surgery
Urology
Cardiology
Cardiology
Critical Care
Gastroenterology
Gastroenterology
Critical Care
Critical Care
Hospitalist

## 2018-08-17 NOTE — PROGRESS NOTE ADULT - PROBLEM SELECTOR PROBLEM 2
Shock
Acute respiratory failure with hypoxia
Shock
Swelling of left upper extremity

## 2018-08-17 NOTE — PROGRESS NOTE ADULT - ASSESSMENT
Patient is a 54y old  Female who presents with a chief complaint of Sepsis/septic shock, resp failure resolved. clinically improving and is ambulating well with PT but not at baseline. Midline was successfully removed today and hemostasis was achieved. pt dc to rehab today

## 2018-08-17 NOTE — PROGRESS NOTE ADULT - PROBLEM SELECTOR PLAN 6
resolved
[facility]: [phone]

## 2018-08-17 NOTE — PROGRESS NOTE ADULT - PROBLEM SELECTOR PROBLEM 4
Acute combined systolic and diastolic congestive heart failure
Jaundice
Acute combined systolic and diastolic congestive heart failure

## 2018-08-17 NOTE — PROGRESS NOTE ADULT - PROBLEM SELECTOR PROBLEM 3
Sepsis, due to unspecified organism
Acute respiratory failure with hypoxia
Acute respiratory failure with hypoxia
Sepsis, due to unspecified organism

## 2018-08-17 NOTE — PROGRESS NOTE ADULT - SUBJECTIVE AND OBJECTIVE BOX
Patient is a 54y old  Female who presents with a chief complaint of Sepsis/septic shock, ?HRS, CRS, Liver failure, severe HAGMA (19 Jul 2018 19:54)      INTERVAL HPI/OVERNIGHT EVENTS:  no overnight events, denies sob or orthopnea     MEDICATIONS  (STANDING):  aspirin  chewable 81 milliGRAM(s) Oral daily  furosemide    Tablet 40 milliGRAM(s) Oral daily  midodrine 2.5 milliGRAM(s) Oral three times a day  simethicone 80 milliGRAM(s) Chew daily    MEDICATIONS  (PRN):        Allergies    penicillin (Unknown)    Intolerances    Milk (Other)              PHYSICAL EXAM:  GENERAL: NAD,   HEAD:  Atraumatic, Normocephalic  EYES: EOMI, PERRLA, conjunctiva and sclera clear  ENMT: No tonsillar erythema, exudates, or enlargement; Moist mucous membranes, poor dentition  NECK: Supple, No JVD, Normal thyroid  NERVOUS SYSTEM:  Alert , Good concentration; Motor Strength 5/5 B/L upper and lower extremities  CHEST/LUNG: decreased breath sounds in the left base; no distress, No rales, rhonchi, wheezing, or rubs  HEART: Regular rate and rhythm; No murmurs, rubs, or gallops  ABDOMEN: Soft, Nontender, Nondistended; Bowel sounds present  EXTREMITIES:  2+ Peripheral Pulses, No clubbing, cyanosis, or edema  LYMPH: No lymphadenopathy noted  SKIN: No rashes or lesions    LABS:                                             CAPILLARY BLOOD GLUCOSE          RADIOLOGY & ADDITIONAL TESTS:    Imaging Personally Reviewed:  [x ] YES  [ ] NO    Consultant(s) Notes Reviewed:  [x ] YES  [ ] NO    Care Discussed with Consultants/Other Providers [x ] YES  [ ] NO

## 2018-08-20 DIAGNOSIS — I34.0 NONRHEUMATIC MITRAL (VALVE) INSUFFICIENCY: ICD-10-CM

## 2018-08-20 DIAGNOSIS — B96.1 KLEBSIELLA PNEUMONIAE [K. PNEUMONIAE] AS THE CAUSE OF DISEASES CLASSIFIED ELSEWHERE: ICD-10-CM

## 2018-08-20 DIAGNOSIS — R65.21 SEVERE SEPSIS WITH SEPTIC SHOCK: ICD-10-CM

## 2018-08-20 DIAGNOSIS — R18.8 OTHER ASCITES: ICD-10-CM

## 2018-08-20 DIAGNOSIS — N20.0 CALCULUS OF KIDNEY: ICD-10-CM

## 2018-08-20 DIAGNOSIS — F43.20 ADJUSTMENT DISORDER, UNSPECIFIED: ICD-10-CM

## 2018-08-20 DIAGNOSIS — A41.9 SEPSIS, UNSPECIFIED ORGANISM: ICD-10-CM

## 2018-08-20 DIAGNOSIS — D68.9 COAGULATION DEFECT, UNSPECIFIED: ICD-10-CM

## 2018-08-20 DIAGNOSIS — E87.6 HYPOKALEMIA: ICD-10-CM

## 2018-08-20 DIAGNOSIS — N39.0 URINARY TRACT INFECTION, SITE NOT SPECIFIED: ICD-10-CM

## 2018-08-20 DIAGNOSIS — Z86.59 PERSONAL HISTORY OF OTHER MENTAL AND BEHAVIORAL DISORDERS: ICD-10-CM

## 2018-08-20 DIAGNOSIS — E88.09 OTHER DISORDERS OF PLASMA-PROTEIN METABOLISM, NOT ELSEWHERE CLASSIFIED: ICD-10-CM

## 2018-08-20 DIAGNOSIS — R60.1 GENERALIZED EDEMA: ICD-10-CM

## 2018-08-20 DIAGNOSIS — N10 ACUTE PYELONEPHRITIS: ICD-10-CM

## 2018-08-20 DIAGNOSIS — Z87.891 PERSONAL HISTORY OF NICOTINE DEPENDENCE: ICD-10-CM

## 2018-08-20 DIAGNOSIS — K72.90 HEPATIC FAILURE, UNSPECIFIED WITHOUT COMA: ICD-10-CM

## 2018-08-20 DIAGNOSIS — I48.91 UNSPECIFIED ATRIAL FIBRILLATION: ICD-10-CM

## 2018-08-20 DIAGNOSIS — J18.9 PNEUMONIA, UNSPECIFIED ORGANISM: ICD-10-CM

## 2018-08-20 DIAGNOSIS — E83.39 OTHER DISORDERS OF PHOSPHORUS METABOLISM: ICD-10-CM

## 2018-08-20 DIAGNOSIS — J96.01 ACUTE RESPIRATORY FAILURE WITH HYPOXIA: ICD-10-CM

## 2018-08-20 DIAGNOSIS — R94.5 ABNORMAL RESULTS OF LIVER FUNCTION STUDIES: ICD-10-CM

## 2018-08-20 DIAGNOSIS — I50.40 UNSPECIFIED COMBINED SYSTOLIC (CONGESTIVE) AND DIASTOLIC (CONGESTIVE) HEART FAILURE: ICD-10-CM

## 2018-08-20 DIAGNOSIS — I36.1 NONRHEUMATIC TRICUSPID (VALVE) INSUFFICIENCY: ICD-10-CM

## 2018-08-20 DIAGNOSIS — R17 UNSPECIFIED JAUNDICE: ICD-10-CM

## 2018-08-20 DIAGNOSIS — B96.20 UNSPECIFIED ESCHERICHIA COLI [E. COLI] AS THE CAUSE OF DISEASES CLASSIFIED ELSEWHERE: ICD-10-CM

## 2018-08-20 DIAGNOSIS — D64.9 ANEMIA, UNSPECIFIED: ICD-10-CM

## 2018-08-20 DIAGNOSIS — N17.0 ACUTE KIDNEY FAILURE WITH TUBULAR NECROSIS: ICD-10-CM

## 2018-08-20 DIAGNOSIS — E87.3 ALKALOSIS: ICD-10-CM

## 2018-08-20 DIAGNOSIS — Z88.0 ALLERGY STATUS TO PENICILLIN: ICD-10-CM

## 2018-08-20 DIAGNOSIS — J81.0 ACUTE PULMONARY EDEMA: ICD-10-CM

## 2018-08-20 DIAGNOSIS — E16.2 HYPOGLYCEMIA, UNSPECIFIED: ICD-10-CM

## 2018-08-20 DIAGNOSIS — N17.9 ACUTE KIDNEY FAILURE, UNSPECIFIED: ICD-10-CM

## 2018-08-20 DIAGNOSIS — E87.2 ACIDOSIS: ICD-10-CM

## 2018-08-20 DIAGNOSIS — G93.41 METABOLIC ENCEPHALOPATHY: ICD-10-CM

## 2018-10-25 PROBLEM — Z00.00 ENCOUNTER FOR PREVENTIVE HEALTH EXAMINATION: Status: ACTIVE | Noted: 2018-10-25

## 2018-10-29 ENCOUNTER — APPOINTMENT (OUTPATIENT)
Dept: VASCULAR SURGERY | Facility: CLINIC | Age: 58
End: 2018-10-29

## 2018-11-08 ENCOUNTER — INBOUND DOCUMENT (OUTPATIENT)
Age: 58
End: 2018-11-08

## 2019-01-08 NOTE — ED PROVIDER NOTE - CARE PLAN
PACU to Home
Principal Discharge DX:	Atrial fibrillation with RVR  Secondary Diagnosis:	Jaundice  Secondary Diagnosis:	Edema

## 2019-06-19 NOTE — PROGRESS NOTE ADULT - PROBLEM/PLAN-6
DISPLAY PLAN FREE TEXT
Warm

## 2019-08-29 NOTE — AIRWAY PLACEMENT NOTE ADULT - AIRWAY COMMENTS:
Cristobal Junior(Attending)
spo2 98% HR 75bpm.
pt intubated by awilda tse, assited by estefani moreau, placed on mechanical ventilation, will continue to monitor
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
## 2021-10-25 NOTE — H&P ADULT - REASON FOR ADMISSION
Sepsis/septic shock, ?HRS, CRS, Liver failure, severe HAGMA I have personally performed a face to face diagnostic evaluation on this patient. I have reviewed the ACP note and agree with the history, exam and plan of care, except as noted.

## 2022-07-18 NOTE — PROVIDER CONTACT NOTE (CRITICAL VALUE NOTIFICATION) - PERSON GIVING RESULT:
Tricia Ohm  : 1947  Primary: Medicare Part A And B  Secondary: [address] [address]  Phone: [phone]  Fax: [phone] Plan Frequency: Two times a week for 8 weeks    Plan of Care/Certification Expiration Date: 22 (POC started on 22)      PT Visit Info:   No data recorded    OUTPATIENT PHYSICAL THERAPY:OP NOTE TYPE: Treatment Note 2022       Episode  }Appt Desk              Treatment Diagnosis:  Pain in Right Hip (M25.551)  Difficulty in walking, Not elsewhere classified (R26.2)  Other abnormalities of gait and mobility (R26.89)  Generalized Muscle Weakness (M62.81)  Medical/Referring Diagnosis:  Presence of unspecified artificial hip joint [Z96.649]  Referring Physician:  David Quintero,*  MD Orders:  PT Eval and Treat   Date of Onset:  Onset Date: 22 (Patient had revision from previous NEAL )     Allergies:   Simvastatin, Levofloxacin, Morphine, Penicillins, and Sulfa antibiotics  Restrictions/Precautions:  No data recordedHip Precautions: No hip flexion > 90 degrees; Posterior hip precautions     Interventions Planned (Treatment may consist of any combination of the following):    Current Treatment Recommendations: Strengthening; ROM; Balance training; Functional mobility training; Transfer training; ADL/Self-care training; Endurance training; Gait training; Neuromuscular re-education; Manual Therapy - Soft Tissue Mobilization; Pain management; Home exercise program; Modalities; Positioning; Integrated dry needling; Therapeutic activities     Subjective Comments:  Patient feeling okay today. Patient reports having a tough day on 22 but reports feeling much improved today. Initial:}    2/10Post Session:       1/10  Medications Last Reviewed:  2022  Updated Objective Findings:  Patient ambulating with cane with reciprocal loading and stance today. Alejandro TALAMANTES from the lab reps    Step Up  2x10 from 6 inch step          Treatment/Session Summary:    Treatment Assessment: Added weight to sit to stand to progress LE strengthening. Added resistance to lateral stepping per read band to progress hip abduction strengthening. Added step up to add functional strengthening. Communication/Consultation:   Discussed HEP and POC with patient. Equipment provided today:  None  Recommendations/Intent for next treatment session: Next visit will focus on participation in graded exercise program to improve activity tolerance, strength, ROM, and function.     Total Treatment Billable Duration:  54 minutes   Time In: 1100  Time Out: [address] [facility] [address] [address], PT       Charge Capture  }Post Session Pain  PT Visit Info  [facility] [facility]  MD Guidelines  Scanned Media  Benefits  MyChart    Future Appointments   Date Time Provider Jitendra Houston   7/20/2022  9:00 AM Mamadou Devi, PTA [facility]   7/25/2022 10:00 AM Yolis Reed, PT [facility]   7/27/2022 10:00 AM Yolis Reed, PT [facility]   8/2/2022 10:00 AM Mamadou Devi, PTA [facility]   8/4/2022 10:00 AM Mamadou Devi, PTA [facility]   8/8/2022 11:00 AM Yolis Reed, PT [facility]   8/11/2022 11:00 AM Yolis Reed, PT [facility]   8/15/2022 10:00 AM Mamadou Devi, PTA [facility]   8/16/2022 10:00 AM Yolis Reed, PT [facility]   10/7/2022  1:20 PM Ayden [address] [place] [address]   10/7/2022  2:00 PM SS [facility]   10/24/2022  1:00 PM Karlee Sun MD [facility]   1/11/2023  9:00 AM Niyah Gomez MD [address] [facility]

## 2022-10-13 NOTE — PROGRESS NOTE ADULT - PROBLEM SELECTOR PLAN 5
October 13, 2022    Cyndi Clark  [address] [place] [address]    Dear Cyndi Clark:  MRN: [id number]    It is the duty of the [facility] Transplant Selection Committee to determine which patients are candidates for a transplant. For this reason, our committee has the difficult task of evaluating patients to determine which ones have the greatest chance of having a successful transplant. We are aware of the magnitude of this responsibility, and we approach it with reverence and humility.    It is with regret I inform you that you are not approved as a transplant candidate due to  you confirming  that you received a kidney transplant at another center .  Based on this review, we have determined that at this time, you are not a candidate for a transplant at [facility].      The selection committee carefully considers each patient's transplant candidacy and determines whether it is safe to proceed with transplantation on a case-by-case basis using established selection criteria.  At present, the risk of proceeding with an elective transplant surgery has become too high.                                                                               Although the selection committee believes you are not a suitable transplant candidate, you have the option to be evaluated at other transplant centers who may have different selection criteria.  You may request your [facility] records be sent to any center of your choice by contacting our Medical Records Department at [phone].                                                                               Attached is a letter from the [employer] Network for Organ Sharing (UNOS).  It describes the services and information offered to patients by UNOS and the Organ Procurement and Transplant Network.    The [facility] Kidney Selection Committee sincerely wishes you the best and remains available to answer any questions.  Please do not hesitate to contact our pre-transplant office  if we can assist you in any other way.                                                                               Sincerely,      Varsha Jason MD  Medical Director, Kidney & Kidney/Pancreas Transplantation    CC:  Dr. Cecy Reilly      Encl: UNOS Letter               The Organ Procurement and Transplantation Network   Toll-free patient services line: Your resource for organ transplant information     If you have a question regarding your own medical care, you always should call your transplant hospital first. However, for general organ transplant-related information, you can call the Organ Procurement and Transplantation Network (OPTN) toll-free patient services line at [phone].     Anyone, including potential transplant candidates, candidates, recipients, family members, friends, living donors, and donor family members, can call this number to:     Talk about organ donation, living donation, the transplant process, the donation process, and transplant policies.   Get a free patient information kit with helpful booklets, waiting list and transplant information, and a list of all transplant hospitals.   Ask questions about the OPTN website (https://optn.transplant.hrsa.gov/), the [employer] for Organ Sharings (UNOS) website (https://unos.org/), or the UNOS website for living donors and transplant recipients. (https://www.transplantliving.org/).   Learn how the OPTN can help you.   Talk about any concerns that you may have with a transplant hospital.     The nations transplant system, the OPTN, is managed under federal contract by the [employer] for Organ Sharing (UNOS), which is a non-profit charitable organization. The OPTN helps create and define organ sharing policies that make the best use of donated organs. This process continuously evaluating new advances and discoveries so policies can be adapted to best serve patients waiting for transplants. To do so, the OPTN works closely  with transplant professionals, transplant patients, transplant candidates, donor families, living donors, and the public. All transplant programs and organ procurement organizations throughout the country are OPTN members and are obligated to follow the policies the OPTN creates for allocating organs.     The OPTN also is responsible for:   Providing educational material for patients, the public, and professionals.   Raising awareness of the need for donated organs and tissue.   Coordinating organ procurement, matching, and placement.   Collecting information about every organ transplant and donation that occurs in the [place].     Remember, you should contact your transplant hospital directly if you have questions or concerns about your own medical care including medical records, work-up progress, and test results.     We are not your transplant hospital, and our staff will not be able to answer questions about your case, so please keep your transplant hospitals phone number handy.   However, while you research your transplant needs and learn as much as you can about transplantation and donation, we welcome your call to our toll-free patient services line at [phone].     eventual nephrosolithotripsy when medically stable urology ..

## 2023-08-09 NOTE — ED PROVIDER NOTE - PHYSICAL EXAMINATION
Gen: Alert, poor hygiene  Head: NC, AT, PERRL, EOMI, normal lids, Bl scleral icterus  ENT: normal hearing, patent oropharynx without erythema/exudate, uvula midline, poor dentition, appears dehydrated  Neck: +supple, no tenderness/meningismus/JVD, +Trachea midline  Pulm: Bilateral BS, tachypneic, no wheeze/stridor/retractions  CV: RRR, no M/R/G, +dist pulses  Abd: soft, NT/ND, +BS, no palpable masses, edema to abdomen  Mskel: no erythema/cyanosis, BL marked pitting edema  Skin: diffuse jaundice, warm/dry  Neuro: AAOx2, no apparent sensory/motor deficits, coordination intact
98

## 2023-10-16 NOTE — ED ADULT TRIAGE NOTE - NS ED NURSE AMBULANCES
[facility] Nsaids Pregnancy And Lactation Text: These medications are considered safe up to 30 weeks gestation. It is excreted in breast milk.

## 2024-01-10 NOTE — BEHAVIORAL HEALTH ASSESSMENT NOTE - NSBHCONSULTFOLLOW_PSY_A_CORE
Patient is running A fib with RVR. Doctor states given meds as ordered including  Toprol-XL and Amiodarone  as ordered   yes

## 2024-01-18 NOTE — OCCUPATIONAL THERAPY INITIAL EVALUATION ADULT - RUE MMT, REHAB EVAL
Mother notified as per DARRELL Mcleod.    Mom states that she has started to make changes to lifestyle, meals, exercise after seeing results on MyChart.  Did not get appt with Nutrition as they only have morning appts avail and he has school.  I urged her to at least make an appt for one of his days off from school.    Advised to recheck fasting labs in 6 months -- [facility] order already on chart.        ----- Message from DARRELL Lieberman sent at 1/15/2024  9:16 AM EST -----  Please notify patient:     Cholesterol, LDL, and triglycerides quite high. Recommend healthy diet: lean proteins, veggies, fruits, whole grains, and legumes.  Please follow up with nutrition--- referral given at last appt. Increase physical activity. Thyroid was normal but would like to recheck in 6 months as TSH was upper limits normal.      4/5

## 2024-12-31 NOTE — PHYSICAL THERAPY INITIAL EVALUATION ADULT - GENERAL OBSERVATIONS, REHAB EVAL
Pt encountered supine in bed, alert, responding to name, cardiac monitor, iv, nova, flexi-seal, intubated, NAD.
12